# Patient Record
Sex: FEMALE | Race: WHITE | Employment: PART TIME | URBAN - METROPOLITAN AREA
[De-identification: names, ages, dates, MRNs, and addresses within clinical notes are randomized per-mention and may not be internally consistent; named-entity substitution may affect disease eponyms.]

---

## 2023-07-11 LAB
ANION GAP IN SER/PLAS: 11 MMOL/L (ref 10–20)
BASOPHILS (10*3/UL) IN BLOOD BY AUTOMATED COUNT: 0.04 X10E9/L (ref 0–0.1)
BASOPHILS/100 LEUKOCYTES IN BLOOD BY AUTOMATED COUNT: 0.6 % (ref 0–2)
CALCIUM (MG/DL) IN SER/PLAS: 9.3 MG/DL (ref 8.6–10.3)
CANCER AG 27-29 (U/ML) IN SER/PLAS: 43.8 U/ML (ref 0–38.6)
CARBON DIOXIDE, TOTAL (MMOL/L) IN SER/PLAS: 29 MMOL/L (ref 21–32)
CARCINOEMBRYONIC AG (NG/ML) IN SER/PLAS: 1.1 UG/L
CHLORIDE (MMOL/L) IN SER/PLAS: 102 MMOL/L (ref 98–107)
CREATININE (MG/DL) IN SER/PLAS: 0.83 MG/DL (ref 0.5–1.05)
EOSINOPHILS (10*3/UL) IN BLOOD BY AUTOMATED COUNT: 0.14 X10E9/L (ref 0–0.7)
EOSINOPHILS/100 LEUKOCYTES IN BLOOD BY AUTOMATED COUNT: 2.1 % (ref 0–6)
ERYTHROCYTE DISTRIBUTION WIDTH (RATIO) BY AUTOMATED COUNT: 12 % (ref 11.5–14.5)
ERYTHROCYTE MEAN CORPUSCULAR HEMOGLOBIN CONCENTRATION (G/DL) BY AUTOMATED: 32.6 G/DL (ref 32–36)
ERYTHROCYTE MEAN CORPUSCULAR VOLUME (FL) BY AUTOMATED COUNT: 87 FL (ref 80–100)
ERYTHROCYTES (10*6/UL) IN BLOOD BY AUTOMATED COUNT: 4.94 X10E12/L (ref 4–5.2)
GFR FEMALE: 76 ML/MIN/1.73M2
GLUCOSE (MG/DL) IN SER/PLAS: 89 MG/DL (ref 74–99)
HEMATOCRIT (%) IN BLOOD BY AUTOMATED COUNT: 43.2 % (ref 36–46)
HEMOGLOBIN (G/DL) IN BLOOD: 14.1 G/DL (ref 12–16)
IMMATURE GRANULOCYTES/100 LEUKOCYTES IN BLOOD BY AUTOMATED COUNT: 0.3 % (ref 0–0.9)
LEUKOCYTES (10*3/UL) IN BLOOD BY AUTOMATED COUNT: 6.8 X10E9/L (ref 4.4–11.3)
LYMPHOCYTES (10*3/UL) IN BLOOD BY AUTOMATED COUNT: 2.45 X10E9/L (ref 1.2–4.8)
LYMPHOCYTES/100 LEUKOCYTES IN BLOOD BY AUTOMATED COUNT: 36.2 % (ref 13–44)
MONOCYTES (10*3/UL) IN BLOOD BY AUTOMATED COUNT: 0.45 X10E9/L (ref 0.1–1)
MONOCYTES/100 LEUKOCYTES IN BLOOD BY AUTOMATED COUNT: 6.7 % (ref 2–10)
NEUTROPHILS (10*3/UL) IN BLOOD BY AUTOMATED COUNT: 3.66 X10E9/L (ref 1.2–7.7)
NEUTROPHILS/100 LEUKOCYTES IN BLOOD BY AUTOMATED COUNT: 54.1 % (ref 40–80)
PLATELETS (10*3/UL) IN BLOOD AUTOMATED COUNT: 288 X10E9/L (ref 150–450)
POTASSIUM (MMOL/L) IN SER/PLAS: 4.3 MMOL/L (ref 3.5–5.3)
SODIUM (MMOL/L) IN SER/PLAS: 138 MMOL/L (ref 136–145)
UREA NITROGEN (MG/DL) IN SER/PLAS: 19 MG/DL (ref 6–23)

## 2023-10-02 ENCOUNTER — PREP FOR PROCEDURE (OUTPATIENT)
Dept: OBSTETRICS AND GYNECOLOGY | Facility: HOSPITAL | Age: 68
End: 2023-10-02
Payer: MEDICARE

## 2023-10-02 DIAGNOSIS — N95.0 POSTMENOPAUSAL BLEEDING: Primary | ICD-10-CM

## 2023-10-03 PROBLEM — N95.0 POSTMENOPAUSAL BLEEDING: Status: ACTIVE | Noted: 2023-10-02

## 2023-10-03 ASSESSMENT — ENCOUNTER SYMPTOMS
OCCASIONAL FEELINGS OF UNSTEADINESS: 0
LOSS OF SENSATION IN FEET: 0

## 2023-10-04 ENCOUNTER — PREP FOR PROCEDURE (OUTPATIENT)
Dept: OBSTETRICS AND GYNECOLOGY | Facility: CLINIC | Age: 68
End: 2023-10-04
Payer: MEDICARE

## 2023-10-04 ENCOUNTER — LAB (OUTPATIENT)
Dept: LAB | Facility: LAB | Age: 68
End: 2023-10-04
Payer: MEDICARE

## 2023-10-04 ENCOUNTER — PRE-ADMISSION TESTING (OUTPATIENT)
Dept: PREADMISSION TESTING | Facility: HOSPITAL | Age: 68
End: 2023-10-04
Payer: MEDICARE

## 2023-10-04 VITALS
BODY MASS INDEX: 25.39 KG/M2 | HEIGHT: 63 IN | TEMPERATURE: 97.3 F | HEART RATE: 90 BPM | WEIGHT: 143.3 LBS | RESPIRATION RATE: 16 BRPM | OXYGEN SATURATION: 94 % | SYSTOLIC BLOOD PRESSURE: 127 MMHG | DIASTOLIC BLOOD PRESSURE: 63 MMHG

## 2023-10-04 DIAGNOSIS — Z01.818 PRE-OP EVALUATION: ICD-10-CM

## 2023-10-04 DIAGNOSIS — N95.0 POSTMENOPAUSAL BLEEDING: ICD-10-CM

## 2023-10-04 DIAGNOSIS — N95.0 POSTMENOPAUSAL BLEEDING: Primary | ICD-10-CM

## 2023-10-04 DIAGNOSIS — I10 HYPERTENSION, UNSPECIFIED TYPE: ICD-10-CM

## 2023-10-04 DIAGNOSIS — N95.0 PMB (POSTMENOPAUSAL BLEEDING): ICD-10-CM

## 2023-10-04 DIAGNOSIS — I10 HYPERTENSION, UNSPECIFIED TYPE: Primary | ICD-10-CM

## 2023-10-04 LAB
ANION GAP SERPL CALC-SCNC: 12 MMOL/L (ref 10–20)
BUN SERPL-MCNC: 24 MG/DL (ref 6–23)
CALCIUM SERPL-MCNC: 9.8 MG/DL (ref 8.6–10.3)
CHLORIDE SERPL-SCNC: 99 MMOL/L (ref 98–107)
CO2 SERPL-SCNC: 29 MMOL/L (ref 21–32)
CREAT SERPL-MCNC: 0.76 MG/DL (ref 0.5–1.05)
ERYTHROCYTE [DISTWIDTH] IN BLOOD BY AUTOMATED COUNT: 12 % (ref 11.5–14.5)
GFR SERPL CREATININE-BSD FRML MDRD: 85 ML/MIN/1.73M*2
GLUCOSE SERPL-MCNC: 69 MG/DL (ref 74–99)
HCT VFR BLD AUTO: 44.6 % (ref 36–46)
HGB BLD-MCNC: 14.8 G/DL (ref 12–16)
MCH RBC QN AUTO: 29.2 PG (ref 26–34)
MCHC RBC AUTO-ENTMCNC: 33.2 G/DL (ref 32–36)
MCV RBC AUTO: 88 FL (ref 80–100)
NRBC BLD-RTO: 0 /100 WBCS (ref 0–0)
PLATELET # BLD AUTO: 350 X10*3/UL (ref 150–450)
PMV BLD AUTO: 9 FL (ref 7.5–11.5)
POTASSIUM SERPL-SCNC: 4 MMOL/L (ref 3.5–5.3)
RBC # BLD AUTO: 5.07 X10*6/UL (ref 4–5.2)
SODIUM SERPL-SCNC: 136 MMOL/L (ref 136–145)
WBC # BLD AUTO: 8.1 X10*3/UL (ref 4.4–11.3)

## 2023-10-04 PROCEDURE — 36415 COLL VENOUS BLD VENIPUNCTURE: CPT

## 2023-10-04 PROCEDURE — 86900 BLOOD TYPING SEROLOGIC ABO: CPT

## 2023-10-04 PROCEDURE — 80048 BASIC METABOLIC PNL TOTAL CA: CPT

## 2023-10-04 PROCEDURE — 86850 RBC ANTIBODY SCREEN: CPT

## 2023-10-04 PROCEDURE — 86901 BLOOD TYPING SEROLOGIC RH(D): CPT

## 2023-10-04 PROCEDURE — 93005 ELECTROCARDIOGRAM TRACING: CPT

## 2023-10-04 PROCEDURE — 85027 COMPLETE CBC AUTOMATED: CPT

## 2023-10-04 RX ORDER — LISINOPRIL 20 MG/1
20 TABLET ORAL DAILY
COMMUNITY
Start: 2010-07-03 | End: 2023-12-06 | Stop reason: SDUPTHER

## 2023-10-04 RX ORDER — HYDROGEN PEROXIDE 3 %
20 SOLUTION, NON-ORAL MISCELLANEOUS DAILY
COMMUNITY

## 2023-10-04 RX ORDER — MULTIVITAMIN
1 TABLET ORAL DAILY
COMMUNITY

## 2023-10-04 ASSESSMENT — ENCOUNTER SYMPTOMS
GASTROINTESTINAL NEGATIVE: 1
ENDOCRINE NEGATIVE: 1
NEUROLOGICAL NEGATIVE: 1
MUSCULOSKELETAL NEGATIVE: 1
CARDIOVASCULAR NEGATIVE: 1
PSYCHIATRIC NEGATIVE: 1
RESPIRATORY NEGATIVE: 1
HEMATOLOGIC/LYMPHATIC NEGATIVE: 1
CONSTITUTIONAL NEGATIVE: 1
EYES NEGATIVE: 1

## 2023-10-04 ASSESSMENT — CHADS2 SCORE
AGE GREATER THAN OR EQUAL TO 75: NO
PRIOR STROKE OR TIA OR THROMBOEMBOLISM: NO
AGE GREATER THAN OR EQUAL TO 75: NO
DIABETES: NO
AGE GREATER THAN OR EQUAL TO 75: NO
CHF: NO
HYPERTENSION: YES

## 2023-10-04 ASSESSMENT — PAIN - FUNCTIONAL ASSESSMENT: PAIN_FUNCTIONAL_ASSESSMENT: 0-10

## 2023-10-04 ASSESSMENT — LIFESTYLE VARIABLES: SMOKING_STATUS: NONSMOKER

## 2023-10-04 ASSESSMENT — PAIN SCALES - GENERAL: PAINLEVEL_OUTOF10: 0 - NO PAIN

## 2023-10-04 ASSESSMENT — ACTIVITIES OF DAILY LIVING (ADL): ADL_SCORE: 0

## 2023-10-04 NOTE — PREPROCEDURE INSTRUCTIONS
Medication List            Accurate as of October 4, 2023  2:33 PM. Always use your most recent med list.                Daily Multi-Vitamin tablet  Generic drug: multivitamin  Medication Adjustments for Surgery: Stop 7 days before surgery     esomeprazole 20 mg DR capsule  Commonly known as: NexIUM  Medication Adjustments for Surgery: Take morning of surgery with sip of water, no other fluids     GEMFIBROZIL ORAL  Medication Adjustments for Surgery: Other (Comment)  Notes to patient: Take the day before surgery usual time if you take in am can take morning of surgery with sips of water     lisinopril 20 mg tablet  Medication Adjustments for Surgery: Continue until night before surgery                PRE-OPERATIVE INSTRUCTIONS    You will receive notification one business day prior to your surgery to confirm your arrival time and additional information. It is important that you answer your phone and/or check your messages during this time.    Please enter the building through the Outpatient entrance. Take the elevator off the lobby to the 2nd floor and check in at the Outpatient Surgery desk    INSTRUCTIONS:  Talk to your surgeon for instructions if you should stop your aspirin, blood thinner, or diabetes medicines.  DO NOT take any multivitamins or over the counter supplements for 7-10 days before surgery.  If not being admitted, you must have an adult immediately available to drive you home after surgery. We also highly recommend you have someone stay with you for the entire day and night of your surgery.  For children having surgery, a parent or legal guardian must accompany t hem to the surgery center. If this is not possible, please call [phone] to make additional arrangements.  For adults who are unable to consent or make medical decisions for themselves, a legal guardian or Power of  must accompany them to the surgery center. If this is not possible, please call [phone] to make additional  arrangements.  Wear comfortable, loose fitting clothing.  All jewelry and piercings must be removed. If you are unable to remove an item or have a dermal piercing, please be sure to tell the nurse when you arrive for surgery.  Nail polish and make-up must be removed.  Avoid smoking or consuming alcohol for 24 hours before surgery.  To help prevent infection, please take a shower/bath and wash your hair the night before and/or morning of surgery.    Additional instructions about eating and drinking before surgery:  Do not eat any solid foods or drink anything but clear liquids within 6 hours of your arrival time for surgery. Milk, nutritional drinks/supplements, and infant formula are considered solid foods.  You may drink clear liquids up to 2 hours before your arrival time for surgery, unless directed otherwise by your surgeon. Clear liquids include water, non-carbonated sports drinks (Gatorade), black tea or coffee (no creamers) and breast milk.    If you received a blue folder, please review additional information provided inside the folder regarding additional preparation.     If you have any questions or concerns, please call Pre-Admission Testing at [phone].

## 2023-10-04 NOTE — CPM/PAT H&P
CPM/PAT Evaluation       Name: Mica Vanessa (Mica Vanessa)  /Age: 1955/68 y.o.     In-Person       Chief Complaint: postmenopausal bleedng    Jennifer is a very pleasant 69 yo with PMHx of breast cancer, HTN, HLD, and postmenopausal bleeding.  Pt states she had an episode of bleeding last .  At that time she underwent pelvic ultrasound and uterine biopsy.  The last few months pt has had a few more episodes of bleeding.  She denies recent fever or chills..     Past Medical History:   Diagnosis Date    Abnormal ECG     hx of    Breast cancer (CMS/HCC)     Essential (primary) hypertension 2022    Benign essential hypertension    Hyperlipidemia     Other seasonal allergic rhinitis     Seasonal allergies    Personal history of malignant neoplasm of breast     History of malignant neoplasm of breast    Personal history of other diseases of the circulatory system     History of hypertension    Personal history of other diseases of the digestive system     History of gastrointestinal disorder       Past Surgical History:   Procedure Laterality Date    OTHER SURGICAL HISTORY  2022     section    OTHER SURGICAL HISTORY  2022    Tonsillectomy    OTHER SURGICAL HISTORY  2022    Lumpectomy       Patient  has no history on file for sexual activity.    No family history on file.    No Known Allergies    Prior to Admission medications    Medication Sig Start Date End Date Taking? Authorizing Provider   lisinopril 20 mg tablet Take 1 tablet (20 mg) by mouth once daily. 7/3/10  Yes Historical Provider, MD   esomeprazole (NexIUM) 20 mg DR capsule Take 1 capsule (20 mg) by mouth once daily.    Historical Provider, MD   GEMFIBROZIL ORAL Take 500 mg by mouth early in the morning..    Historical Provider, MD   multivitamin (Daily Multi-Vitamin) tablet Take 1 tablet by mouth once daily.    Historical Provider, MD        Review of Systems   Constitutional: Negative.    HENT: Negative.     Eyes:  Negative.    Respiratory: Negative.     Cardiovascular: Negative.    Gastrointestinal: Negative.    Endocrine: Negative.    Genitourinary:         Postmenopausal bleeding   Musculoskeletal: Negative.    Skin: Negative.    Neurological: Negative.    Hematological: Negative.    Psychiatric/Behavioral: Negative.           Physical Exam  Vitals reviewed.   Constitutional:       Appearance: Normal appearance.   HENT:      Head: Normocephalic and atraumatic.      Nose: Nose normal.   Eyes:      Pupils: Pupils are equal, round, and reactive to light.   Cardiovascular:      Rate and Rhythm: Normal rate and regular rhythm.   Pulmonary:      Effort: Pulmonary effort is normal.      Breath sounds: Normal breath sounds.   Abdominal:      Palpations: Abdomen is soft.   Musculoskeletal:         General: Normal range of motion.      Cervical back: Normal range of motion.   Skin:     General: Skin is warm and dry.   Neurological:      General: No focal deficit present.      Mental Status: She is alert and oriented to person, place, and time.   Psychiatric:         Mood and Affect: Mood normal.         Behavior: Behavior normal.          PAT AIRWAY:   normal        Visit Vitals  /63   Pulse 90   Temp 36.3 °C (97.3 °F) (Tympanic)   Resp 16   ECG left axis NSR rate of 81  No previous complications with anesthesia   Latest Reference Range & Units 07/11/23 11:35   GLUCOSE 74 - 99 mg/dL 89   SODIUM 136 - 145 mmol/L 138   POTASSIUM 3.5 - 5.3 mmol/L 4.3   CHLORIDE 98 - 107 mmol/L 102   Bicarbonate 21 - 32 mmol/L 29   Anion Gap 10 - 20 mmol/L 11   Blood Urea Nitrogen 6 - 23 mg/dL 19   Creatinine 0.50 - 1.05 mg/dL 0.83   Calcium 8.6 - 10.3 mg/dL 9.3      Latest Reference Range & Units 07/11/23 11:35   WBC 4.4 - 11.3 x10E9/L 6.8   RBC 4.00 - 5.20 x10E12/L 4.94   HEMOGLOBIN 12.0 - 16.0 g/dL 14.1   HEMATOCRIT 36.0 - 46.0 % 43.2   MCV 80 - 100 fL 87   MCHC 32.0 - 36.0 g/dL 32.6   RED CELL DISTRIBUTION WIDTH 11.5 - 14.5 % 12.0   Platelets  150 - 450 x10E9/L 288   Neutrophils % 40.0 - 80.0 % 54.1   Immature Granulocytes %, Automated 0.0 - 0.9 % 0.3   Lymphocytes % 13.0 - 44.0 % 36.2   Monocytes % 2.0 - 10.0 % 6.7   Eosinophils % 0.0 - 6.0 % 2.1   Basophils % 0.0 - 2.0 % 0.6   Neutrophils Absolute 1.20 - 7.70 x10E9/L 3.66   Lymphocytes Absolute 1.20 - 4.80 x10E9/L 2.45   Monocytes Absolute 0.10 - 1.00 x10E9/L 0.45   Eosinophils Absolute 0.00 - 0.70 x10E9/L 0.14   Basophils Absolute 0.00 - 0.10 x10E9/L 0.04       DASI Risk Score    No data to display       Caprini DVT Assessment      Flowsheet Row Most Recent Value   DVT Score 3   History Prior major surgery   Age 60-75 years          Modified Frailty Index      Flowsheet Row Most Recent Value   Modified Frailty Index Calculator .0909          CHADS2 Stroke Risk  Current as of 4 minutes ago        N/A 3 - 100%: High Risk   2 - 3%: Medium Risk   0 - 2%: Low Risk     Last Change: N/A          This score determines the patient's risk of having a stroke if the patient has atrial fibrillation.        This score is not applicable to this patient. Components are not calculated.          Revised Cardiac Risk Index      Flowsheet Row Most Recent Value   Revised Cardiac Risk Calculator 0          Apfel Simplified Score      Flowsheet Row Most Recent Value   Apfel Simplified Score Calculator 4          Risk Analysis Index Results This Encounter         10/4/2023  1411             MARQUEZ Cancer History: Patient does not indicate history of cancer          Stop Bang Score      Flowsheet Row Most Recent Value   Is your neck circumference greater than 17 inches (Male) or 16 inches (Female)? 0            Assessment and Plan:     Plan for OR on 10/6   CBC T & S  BMP

## 2023-10-05 LAB
ABO GROUP (TYPE) IN BLOOD: NORMAL
ANTIBODY SCREEN: NORMAL
RH FACTOR (ANTIGEN D): NORMAL

## 2023-10-06 ENCOUNTER — ANESTHESIA EVENT (OUTPATIENT)
Dept: OPERATING ROOM | Facility: HOSPITAL | Age: 68
End: 2023-10-06
Payer: MEDICARE

## 2023-10-06 ENCOUNTER — HOSPITAL ENCOUNTER (OUTPATIENT)
Facility: HOSPITAL | Age: 68
Setting detail: OUTPATIENT SURGERY
Discharge: HOME | End: 2023-10-06
Attending: OBSTETRICS & GYNECOLOGY | Admitting: OBSTETRICS & GYNECOLOGY
Payer: MEDICARE

## 2023-10-06 ENCOUNTER — ANESTHESIA (OUTPATIENT)
Dept: OPERATING ROOM | Facility: HOSPITAL | Age: 68
End: 2023-10-06
Payer: MEDICARE

## 2023-10-06 VITALS
DIASTOLIC BLOOD PRESSURE: 53 MMHG | TEMPERATURE: 96.8 F | HEART RATE: 68 BPM | SYSTOLIC BLOOD PRESSURE: 106 MMHG | WEIGHT: 140 LBS | OXYGEN SATURATION: 94 % | RESPIRATION RATE: 16 BRPM | HEIGHT: 62 IN | BODY MASS INDEX: 25.76 KG/M2

## 2023-10-06 DIAGNOSIS — Z01.818 ENCOUNTER FOR PREADMISSION TESTING: Primary | ICD-10-CM

## 2023-10-06 DIAGNOSIS — N95.0 POSTMENOPAUSAL BLEEDING: ICD-10-CM

## 2023-10-06 PROBLEM — I10 HTN (HYPERTENSION): Status: ACTIVE | Noted: 2023-10-06

## 2023-10-06 PROBLEM — K21.9 GASTROESOPHAGEAL REFLUX DISEASE: Status: ACTIVE | Noted: 2023-10-06

## 2023-10-06 LAB
ATRIAL RATE: 81 BPM
P AXIS: 67 DEGREES
P OFFSET: 168 MS
P ONSET: 124 MS
PR INTERVAL: 180 MS
Q ONSET: 214 MS
QRS COUNT: 13 BEATS
QRS DURATION: 82 MS
QT INTERVAL: 376 MS
QTC CALCULATION(BAZETT): 436 MS
QTC FREDERICIA: 415 MS
R AXIS: -44 DEGREES
T AXIS: 37 DEGREES
T OFFSET: 402 MS
VENTRICULAR RATE: 81 BPM

## 2023-10-06 PROCEDURE — 58558 HYSTEROSCOPY BIOPSY: CPT | Performed by: OBSTETRICS & GYNECOLOGY

## 2023-10-06 PROCEDURE — 2500000004 HC RX 250 GENERAL PHARMACY W/ HCPCS (ALT 636 FOR OP/ED): Performed by: STUDENT IN AN ORGANIZED HEALTH CARE EDUCATION/TRAINING PROGRAM

## 2023-10-06 PROCEDURE — 88341 IMHCHEM/IMCYTCHM EA ADD ANTB: CPT | Performed by: PATHOLOGY

## 2023-10-06 PROCEDURE — 2500000005 HC RX 250 GENERAL PHARMACY W/O HCPCS: Performed by: ANESTHESIOLOGIST ASSISTANT

## 2023-10-06 PROCEDURE — 88342 IMHCHEM/IMCYTCHM 1ST ANTB: CPT | Mod: TC,SUR,STJLAB | Performed by: OBSTETRICS & GYNECOLOGY

## 2023-10-06 PROCEDURE — 3700000001 HC GENERAL ANESTHESIA TIME - INITIAL BASE CHARGE: Performed by: OBSTETRICS & GYNECOLOGY

## 2023-10-06 PROCEDURE — 2720000007 HC OR 272 NO HCPCS: Performed by: OBSTETRICS & GYNECOLOGY

## 2023-10-06 PROCEDURE — 88342 IMHCHEM/IMCYTCHM 1ST ANTB: CPT | Performed by: PATHOLOGY

## 2023-10-06 PROCEDURE — 7100000001 HC RECOVERY ROOM TIME - INITIAL BASE CHARGE: Performed by: OBSTETRICS & GYNECOLOGY

## 2023-10-06 PROCEDURE — 88341 IMHCHEM/IMCYTCHM EA ADD ANTB: CPT | Mod: TC,STJLAB | Performed by: OBSTETRICS & GYNECOLOGY

## 2023-10-06 PROCEDURE — A58558 PR HYSTEROSCOPY,W/ENDO BX: Performed by: ANESTHESIOLOGIST ASSISTANT

## 2023-10-06 PROCEDURE — 7100000009 HC PHASE TWO TIME - INITIAL BASE CHARGE: Performed by: OBSTETRICS & GYNECOLOGY

## 2023-10-06 PROCEDURE — 2580000001 HC RX 258 IV SOLUTIONS: Performed by: ANESTHESIOLOGIST ASSISTANT

## 2023-10-06 PROCEDURE — 3700000002 HC GENERAL ANESTHESIA TIME - EACH INCREMENTAL 1 MINUTE: Performed by: OBSTETRICS & GYNECOLOGY

## 2023-10-06 PROCEDURE — 93010 ELECTROCARDIOGRAM REPORT: CPT | Performed by: INTERNAL MEDICINE

## 2023-10-06 PROCEDURE — 7100000002 HC RECOVERY ROOM TIME - EACH INCREMENTAL 1 MINUTE: Performed by: OBSTETRICS & GYNECOLOGY

## 2023-10-06 PROCEDURE — 7100000010 HC PHASE TWO TIME - EACH INCREMENTAL 1 MINUTE: Performed by: OBSTETRICS & GYNECOLOGY

## 2023-10-06 PROCEDURE — A58558 PR HYSTEROSCOPY,W/ENDO BX: Performed by: STUDENT IN AN ORGANIZED HEALTH CARE EDUCATION/TRAINING PROGRAM

## 2023-10-06 PROCEDURE — 3600000008 HC OR TIME - EACH INCREMENTAL 1 MINUTE - PROCEDURE LEVEL THREE: Performed by: OBSTETRICS & GYNECOLOGY

## 2023-10-06 PROCEDURE — 3600000003 HC OR TIME - INITIAL BASE CHARGE - PROCEDURE LEVEL THREE: Performed by: OBSTETRICS & GYNECOLOGY

## 2023-10-06 PROCEDURE — 2500000004 HC RX 250 GENERAL PHARMACY W/ HCPCS (ALT 636 FOR OP/ED): Performed by: ANESTHESIOLOGIST ASSISTANT

## 2023-10-06 PROCEDURE — 88305 TISSUE EXAM BY PATHOLOGIST: CPT | Performed by: PATHOLOGY

## 2023-10-06 RX ORDER — ONDANSETRON HYDROCHLORIDE 2 MG/ML
INJECTION, SOLUTION INTRAVENOUS AS NEEDED
Status: DISCONTINUED | OUTPATIENT
Start: 2023-10-06 | End: 2023-10-06

## 2023-10-06 RX ORDER — OXYCODONE HYDROCHLORIDE 5 MG/1
5 TABLET ORAL EVERY 4 HOURS PRN
Status: DISCONTINUED | OUTPATIENT
Start: 2023-10-06 | End: 2023-10-06 | Stop reason: HOSPADM

## 2023-10-06 RX ORDER — MIDAZOLAM HYDROCHLORIDE 1 MG/ML
INJECTION, SOLUTION INTRAMUSCULAR; INTRAVENOUS AS NEEDED
Status: DISCONTINUED | OUTPATIENT
Start: 2023-10-06 | End: 2023-10-06

## 2023-10-06 RX ORDER — METOCLOPRAMIDE HYDROCHLORIDE 5 MG/ML
10 INJECTION INTRAMUSCULAR; INTRAVENOUS ONCE AS NEEDED
Status: DISCONTINUED | OUTPATIENT
Start: 2023-10-06 | End: 2023-10-06 | Stop reason: HOSPADM

## 2023-10-06 RX ORDER — SODIUM CHLORIDE, SODIUM LACTATE, POTASSIUM CHLORIDE, CALCIUM CHLORIDE 600; 310; 30; 20 MG/100ML; MG/100ML; MG/100ML; MG/100ML
100 INJECTION, SOLUTION INTRAVENOUS CONTINUOUS
Status: DISCONTINUED | OUTPATIENT
Start: 2023-10-06 | End: 2023-10-06 | Stop reason: HOSPADM

## 2023-10-06 RX ORDER — LABETALOL HYDROCHLORIDE 5 MG/ML
5 INJECTION, SOLUTION INTRAVENOUS ONCE AS NEEDED
Status: DISCONTINUED | OUTPATIENT
Start: 2023-10-06 | End: 2023-10-06 | Stop reason: HOSPADM

## 2023-10-06 RX ORDER — LIDOCAINE HYDROCHLORIDE 10 MG/ML
0.1 INJECTION, SOLUTION EPIDURAL; INFILTRATION; INTRACAUDAL; PERINEURAL ONCE
Status: DISCONTINUED | OUTPATIENT
Start: 2023-10-06 | End: 2023-10-06 | Stop reason: HOSPADM

## 2023-10-06 RX ORDER — FENTANYL CITRATE 50 UG/ML
INJECTION, SOLUTION INTRAMUSCULAR; INTRAVENOUS AS NEEDED
Status: DISCONTINUED | OUTPATIENT
Start: 2023-10-06 | End: 2023-10-06

## 2023-10-06 RX ORDER — PROPOFOL 10 MG/ML
INJECTION, EMULSION INTRAVENOUS AS NEEDED
Status: DISCONTINUED | OUTPATIENT
Start: 2023-10-06 | End: 2023-10-06

## 2023-10-06 RX ORDER — HYDROMORPHONE HYDROCHLORIDE 1 MG/ML
0.5 INJECTION, SOLUTION INTRAMUSCULAR; INTRAVENOUS; SUBCUTANEOUS EVERY 5 MIN PRN
Status: DISCONTINUED | OUTPATIENT
Start: 2023-10-06 | End: 2023-10-06 | Stop reason: HOSPADM

## 2023-10-06 RX ORDER — MIDAZOLAM HYDROCHLORIDE 1 MG/ML
1 INJECTION, SOLUTION INTRAMUSCULAR; INTRAVENOUS ONCE AS NEEDED
Status: DISCONTINUED | OUTPATIENT
Start: 2023-10-06 | End: 2023-10-06 | Stop reason: HOSPADM

## 2023-10-06 RX ORDER — LIDOCAINE HYDROCHLORIDE 20 MG/ML
INJECTION, SOLUTION EPIDURAL; INFILTRATION; INTRACAUDAL; PERINEURAL AS NEEDED
Status: DISCONTINUED | OUTPATIENT
Start: 2023-10-06 | End: 2023-10-06

## 2023-10-06 RX ORDER — ALBUTEROL SULFATE 0.83 MG/ML
2.5 SOLUTION RESPIRATORY (INHALATION) ONCE AS NEEDED
Status: DISCONTINUED | OUTPATIENT
Start: 2023-10-06 | End: 2023-10-06 | Stop reason: HOSPADM

## 2023-10-06 RX ORDER — MEPERIDINE HYDROCHLORIDE 50 MG/ML
12.5 INJECTION INTRAMUSCULAR; INTRAVENOUS; SUBCUTANEOUS EVERY 10 MIN PRN
Status: DISCONTINUED | OUTPATIENT
Start: 2023-10-06 | End: 2023-10-06 | Stop reason: HOSPADM

## 2023-10-06 RX ORDER — METOCLOPRAMIDE HYDROCHLORIDE 5 MG/ML
10 INJECTION INTRAMUSCULAR; INTRAVENOUS ONCE AS NEEDED
Status: COMPLETED | OUTPATIENT
Start: 2023-10-06 | End: 2023-10-06

## 2023-10-06 RX ORDER — MIDAZOLAM HYDROCHLORIDE 1 MG/ML
1 INJECTION INTRAMUSCULAR; INTRAVENOUS ONCE AS NEEDED
Status: DISCONTINUED | OUTPATIENT
Start: 2023-10-06 | End: 2023-10-06 | Stop reason: HOSPADM

## 2023-10-06 RX ORDER — DEXAMETHASONE SODIUM PHOSPHATE 100 MG/10ML
INJECTION INTRAMUSCULAR; INTRAVENOUS AS NEEDED
Status: DISCONTINUED | OUTPATIENT
Start: 2023-10-06 | End: 2023-10-06

## 2023-10-06 RX ADMIN — LIDOCAINE HYDROCHLORIDE 100 MG: 20 INJECTION, SOLUTION EPIDURAL; INFILTRATION; INTRACAUDAL; PERINEURAL at 10:44

## 2023-10-06 RX ADMIN — DEXAMETHASONE SODIUM PHOSPHATE 10 MG: 10 INJECTION INTRAMUSCULAR; INTRAVENOUS at 10:51

## 2023-10-06 RX ADMIN — METOCLOPRAMIDE 10 MG: 5 INJECTION, SOLUTION INTRAMUSCULAR; INTRAVENOUS at 13:58

## 2023-10-06 RX ADMIN — PROPOFOL 200 MG: 10 INJECTION, EMULSION INTRAVENOUS at 10:44

## 2023-10-06 RX ADMIN — FENTANYL CITRATE 25 MCG: 50 INJECTION, SOLUTION INTRAMUSCULAR; INTRAVENOUS at 10:53

## 2023-10-06 RX ADMIN — SODIUM CHLORIDE, SODIUM LACTATE, POTASSIUM CHLORIDE, AND CALCIUM CHLORIDE: 600; 310; 30; 20 INJECTION, SOLUTION INTRAVENOUS at 10:22

## 2023-10-06 RX ADMIN — HYDROMORPHONE HYDROCHLORIDE 0.5 MG: 1 INJECTION, SOLUTION INTRAMUSCULAR; INTRAVENOUS; SUBCUTANEOUS at 11:28

## 2023-10-06 RX ADMIN — ONDANSETRON 4 MG: 2 INJECTION INTRAMUSCULAR; INTRAVENOUS at 10:52

## 2023-10-06 RX ADMIN — MIDAZOLAM 2 MG: 1 INJECTION INTRAMUSCULAR; INTRAVENOUS at 10:39

## 2023-10-06 RX ADMIN — HYDROMORPHONE HYDROCHLORIDE 0.5 MG: 1 INJECTION, SOLUTION INTRAMUSCULAR; INTRAVENOUS; SUBCUTANEOUS at 11:53

## 2023-10-06 RX ADMIN — FENTANYL CITRATE 25 MCG: 50 INJECTION, SOLUTION INTRAMUSCULAR; INTRAVENOUS at 10:58

## 2023-10-06 SDOH — HEALTH STABILITY: MENTAL HEALTH: CURRENT SMOKER: 0

## 2023-10-06 ASSESSMENT — PAIN SCALES - GENERAL
PAINLEVEL_OUTOF10: 1
PAINLEVEL_OUTOF10: 2
PAINLEVEL_OUTOF10: 0 - NO PAIN
PAINLEVEL_OUTOF10: 7
PAINLEVEL_OUTOF10: 1
PAINLEVEL_OUTOF10: 1
PAINLEVEL_OUTOF10: 6
PAINLEVEL_OUTOF10: 7
PAINLEVEL_OUTOF10: 0 - NO PAIN
PAINLEVEL_OUTOF10: 1
PAIN_LEVEL: 0
PAINLEVEL_OUTOF10: 2

## 2023-10-06 ASSESSMENT — PAIN - FUNCTIONAL ASSESSMENT
PAIN_FUNCTIONAL_ASSESSMENT: 0-10

## 2023-10-06 ASSESSMENT — PAIN DESCRIPTION - DESCRIPTORS
DESCRIPTORS: ACHING
DESCRIPTORS: DULL
DESCRIPTORS: DULL
DESCRIPTORS: SHARP

## 2023-10-06 ASSESSMENT — COLUMBIA-SUICIDE SEVERITY RATING SCALE - C-SSRS
1. IN THE PAST MONTH, HAVE YOU WISHED YOU WERE DEAD OR WISHED YOU COULD GO TO SLEEP AND NOT WAKE UP?: NO
6. HAVE YOU EVER DONE ANYTHING, STARTED TO DO ANYTHING, OR PREPARED TO DO ANYTHING TO END YOUR LIFE?: NO
2. HAVE YOU ACTUALLY HAD ANY THOUGHTS OF KILLING YOURSELF?: NO

## 2023-10-06 NOTE — OP NOTE
Date: 10/6/2023  OR Location: [facility] OR    Name: Mica Vanessa, : 1955, Age: 68 y.o., MRN: [id number], Sex: female    Diagnosis  Pre-op Diagnosis     * Postmenopausal bleeding [N95.0] Post-op Diagnosis     * Postmenopausal bleeding [N95.0]     Procedures    * HYSTEROSCOPY DILATION & CURETTAGE, POSS POLYPECTOMY W/AVETA   *DR STEVENSON TO ASSIST*    Surgeons      * Rosalee Parkinson - Primary    Resident/Fellow/Other Assistant:  Latoya Greene MD    Procedure Summary  Anesthesia: General  ASA: II  Anesthesia Staff: Anesthesiologist: Froylan Fontenot DO  C-AA: KEVIN Graff  Estimated Blood Loss: 5mL  Intra-op Medications:   Medication Name Total Dose   HYDROmorphone (Dilaudid) injection 0.5 mg 0.5 mg              Anesthesia Record               Intraprocedure I/O Totals          Intake    .00 mL    Total Intake 300 mL          Specimen:   ID Type Source Tests Collected by Time   1 : Endometrial Polyp and Endometrial Curettings Tissue ENDOMETRIUM POLYPECTOMY - HYSTEROSCOPIC SURGICAL PATHOLOGY EXAM Rosalee Parkinson MD 10/6/2023 1106        Staff:   Circulator: Janie Briscoe RN  Relief Scrub: Sarika Weeks RN  Scrub Person: Christine Girma       Procedure Details:  Indications for procedure:   Pt is a 68 y.o. yo female who presented to the office with recurrent postmenopausal bleeding.  She was counseled for a hysteroscopy, dilation and curettage, possible polypectomy.  Risks including bleeding, infection, uterine perforation discussed and consent signed.    Description of procedure:  The patient was taken to the operating room and placed in supine position on the operating room table.  A stop-check was performed confirming the patient and the procedure to be completed.  General anesthesia was administered. She was then placed in the dorsolithotomy position with her legs in Jamari stirrups.  SCDs were placed and turned on.  The abdomen, perineum, and vagina were prepped sterilely.  The patient was then  draped in standard sterile fashion.  A bivalve speculum was placed in the patient's vagina and the anterior lip of the cervix was grasped with a single tooth tenaculum.  The speculum was removed and lopez retractor was placed posteriorly.      The cervix was carefully and progressively dilated.  The hysteroscope was then inserted with visualization of the uterine body, fundus and tubal ostia.  A small polyp noted at right cornua which was resected using the Aveta. The hysteroscope was then removed.  A sharp curettage was performed.  Minimal bleeding was noted from the uterus.  The tenaculum was removed from the cervix.  The tenaculum sites were noted to be hemostatic.  All instruments were then removed from the vagina.      The patient was cleaned and dried and reversed from anesthesia.  She was returned to supine position.  The patient was taken to the PACU in stable and satisfactory condition.  All sponge, needle, and instrument counts were correct at the end of the procedure.    Rosalee Parkinson MD

## 2023-10-06 NOTE — ANESTHESIA PROCEDURE NOTES
Airway  Date/Time: 10/6/2023 10:46 AM  Urgency: elective    Airway not difficult    Staffing  Performed: KEVIN   Authorized by: Froylan Fontenot DO    Performed by: KEVIN Graff  Patient location during procedure: OR    Indications and Patient Condition  Indications for airway management: anesthesia  Spontaneous Ventilation: absent  Sedation level: deep  Preoxygenated: yes  Patient position: sniffing  Mask difficulty assessment: 1 - vent by mask    Final Airway Details  Final airway type: supraglottic airway      Successful airway: classic  Size 3     Number of attempts at approach: 1

## 2023-10-06 NOTE — ANESTHESIA PREPROCEDURE EVALUATION
Patient: Mica Vanessa    Procedure Information       Date/Time: 10/06/23 1050    Procedure: HYSTEROSCOPY DILATION & CURETTAGE, POSS POLYPECTOMY W/AVETA   *DR STEVENSON TO ASSIST*    Location: [facility] OR  / [facility] OR    Surgeons: Rosalee Parkinson MD            Relevant Problems   Cardiovascular   (+) HTN (hypertension)      GI   (+) Gastroesophageal reflux disease       Clinical information reviewed:    Allergies  Meds               NPO Detail:  No data recorded     Physical Exam    Airway  Mallampati: II  TM distance: >3 FB  Neck ROM: full     Cardiovascular   Rhythm: regular  Rate: normal     Dental - normal exam     Pulmonary - normal exam  Breath sounds clear to auscultation     Abdominal - normal exam  Abdomen: soft             Anesthesia Plan    ASA 2     general     The patient is not a current smoker.  Patient was not previously instructed to abstain from smoking on day of procedure.  Patient did not smoke on day of procedure.    intravenous induction   Postoperative administration of opioids is intended.  Anesthetic plan and risks discussed with patient.  Use of blood products discussed with who consented to blood products.    Plan discussed with CAA.

## 2023-10-06 NOTE — H&P
History Of Present Illness  Mica Vanessa is a 68 y.o. female presenting with postmenopausal bleeding.  Had a a TVUS which showed a 4.2 mm ES but since she had to episodes a office EMB was performed which showed scant degenerating glandular cells.  She then had an additional episode of bleeding and so plan for hysteroscopy, D&C in OR made for adequate sampling of uterus.       Past Medical History  Past Medical History:   Diagnosis Date    Abnormal ECG     hx of    Breast cancer (CMS/HCC)     Essential (primary) hypertension 2022    Benign essential hypertension    Hyperlipidemia     Other seasonal allergic rhinitis     Seasonal allergies    Personal history of malignant neoplasm of breast     History of malignant neoplasm of breast    Personal history of other diseases of the circulatory system     History of hypertension    Personal history of other diseases of the digestive system     History of gastrointestinal disorder       Surgical History  Past Surgical History:   Procedure Laterality Date    LUMBAR PERITONEAL SHUNT      OTHER SURGICAL HISTORY  2022     section    OTHER SURGICAL HISTORY  2022    Tonsillectomy    OTHER SURGICAL HISTORY  2022    Lumpectomy        Social History  She has no history on file for tobacco use, alcohol use, and drug use.    Family History  No family history on file.     Allergies  Patient has no known allergies.    Review of Systems   All other systems reviewed and are negative.       Physical Exam  Constitutional:       Appearance: Normal appearance.   Abdominal:      General: Abdomen is flat.      Palpations: Abdomen is soft.   Neurological:      Mental Status: She is alert.          Last Recorded Vitals  There were no vitals taken for this visit.    Relevant Results    Results for orders placed or performed in visit on 10/04/23 (from the past 96 hour(s))   Type And Screen   Result Value Ref Range    ABO TYPE A     Rh TYPE POS     ANTIBODY SCREEN  NEG    CBC   Result Value Ref Range    WBC 8.1 4.4 - 11.3 x10*3/uL    nRBC 0.0 0.0 - 0.0 /100 WBCs    RBC 5.07 4.00 - 5.20 x10*6/uL    Hemoglobin 14.8 12.0 - 16.0 g/dL    Hematocrit 44.6 36.0 - 46.0 %    MCV 88 80 - 100 fL    MCH 29.2 26.0 - 34.0 pg    MCHC 33.2 32.0 - 36.0 g/dL    RDW 12.0 11.5 - 14.5 %    Platelets 350 150 - 450 x10*3/uL    MPV 9.0 7.5 - 11.5 fL   Basic metabolic panel   Result Value Ref Range    Glucose 69 (L) 74 - 99 mg/dL    Sodium 136 136 - 145 mmol/L    Potassium 4.0 3.5 - 5.3 mmol/L    Chloride 99 98 - 107 mmol/L    Bicarbonate 29 21 - 32 mmol/L    Anion Gap 12 10 - 20 mmol/L    Urea Nitrogen 24 (H) 6 - 23 mg/dL    Creatinine 0.76 0.50 - 1.05 mg/dL    eGFR 85 >60 mL/min/1.73m*2    Calcium 9.8 8.6 - 10.3 mg/dL          Assessment/Plan   Principal Problem:    Postmenopausal bleeding    69 y/o  with recurrent PMB.  Plan hysteroscopy, D&C, possible polypectomy (if present).  Risks including bleeding, infection, uterine perforation discussed and consent signed.  No periop antibiotics indicated.     Rosalee Parkinson MD

## 2023-10-06 NOTE — ANESTHESIA POSTPROCEDURE EVALUATION
Patient: Mica Vanessa    Procedure Summary       Date: 10/06/23 Room / Location: [facility] OR 03 / Virtual [facility] OR    Anesthesia Start: 1039 Anesthesia Stop: 1124    Procedure: HYSTEROSCOPY DILATION & CURETTAGE, POSS POLYPECTOMY W/AVETA   *DR STEVENSON TO ASSIST* Diagnosis:       Postmenopausal bleeding      (POSTMENOPAUSAL BLEEDING)    Surgeons: Rosalee Parkinson MD Responsible Provider: Froylan Fontenot DO    Anesthesia Type: general ASA Status: 2            Anesthesia Type: general    Vitals Value Taken Time   /63 10/06/23 1124   Temp 36.3 10/06/23 1124   Pulse 56 10/06/23 1124   Resp 13 10/06/23 1124   SpO2 100 10/06/23 1124       Anesthesia Post Evaluation    Patient location during evaluation: PACU  Patient participation: complete - patient participated  Level of consciousness: awake  Pain score: 0  Pain management: adequate  Airway patency: patent  Cardiovascular status: acceptable  Respiratory status: acceptable  Hydration status: acceptable        There were no known notable events for this encounter.

## 2023-10-18 DIAGNOSIS — C54.1 ENDOMETRIAL CANCER (MULTI): Primary | ICD-10-CM

## 2023-10-18 LAB
LAB AP ASR DISCLAIMER: NORMAL
LABORATORY COMMENT REPORT: NORMAL
PATH REPORT.ADDENDUM SPEC: NORMAL
PATH REPORT.ADDENDUM SPEC: NORMAL
PATH REPORT.COMMENTS IMP SPEC: NORMAL
PATH REPORT.FINAL DX SPEC: NORMAL
PATH REPORT.GROSS SPEC: NORMAL
PATH REPORT.RELEVANT HX SPEC: NORMAL
PATH REPORT.TOTAL CANCER: NORMAL

## 2023-10-19 PROBLEM — R94.31 ABNORMAL EKG: Status: ACTIVE | Noted: 2023-10-19

## 2023-10-19 PROBLEM — K22.70 BARRETT'S ESOPHAGUS: Status: ACTIVE | Noted: 2019-01-03

## 2023-10-19 PROBLEM — D31.40 BENIGN NEOPLASM OF CILIARY BODY: Status: ACTIVE | Noted: 2019-10-29

## 2023-10-19 PROBLEM — H25.12 NUCLEAR SENILE CATARACT OF LEFT EYE: Status: ACTIVE | Noted: 2019-10-29

## 2023-10-19 PROBLEM — D31.42: Status: ACTIVE | Noted: 2019-10-29

## 2023-10-19 PROBLEM — I20.9 ANGINA PECTORIS (CMS-HCC): Status: ACTIVE | Noted: 2023-10-19

## 2023-10-19 RX ORDER — GEMFIBROZIL 600 MG/1
1 TABLET, FILM COATED ORAL DAILY
COMMUNITY

## 2023-10-19 RX ORDER — LISINOPRIL 20 MG/1
20 TABLET ORAL DAILY
COMMUNITY

## 2023-10-19 RX ORDER — ANASTROZOLE 1 MG/1
1 TABLET ORAL DAILY
COMMUNITY
End: 2023-12-06 | Stop reason: ALTCHOICE

## 2023-10-19 RX ORDER — FLUTICASONE PROPIONATE 50 MCG
SPRAY, SUSPENSION (ML) NASAL
COMMUNITY

## 2023-10-20 ENCOUNTER — APPOINTMENT (OUTPATIENT)
Dept: OBSTETRICS AND GYNECOLOGY | Facility: CLINIC | Age: 68
End: 2023-10-20
Payer: MEDICARE

## 2023-10-23 ENCOUNTER — APPOINTMENT (OUTPATIENT)
Dept: OBSTETRICS AND GYNECOLOGY | Facility: CLINIC | Age: 68
End: 2023-10-23
Payer: MEDICARE

## 2023-11-06 ASSESSMENT — PATIENT HEALTH QUESTIONNAIRE - PHQ9
3. TROUBLE FALLING OR STAYING ASLEEP OR SLEEPING TOO MUCH: 1
7. TROUBLE CONCENTRATING ON THINGS, SUCH AS READING THE NEWSPAPER OR WATCHING TELEVISION: NOT AT ALL
10. IF YOU CHECKED OFF ANY PROBLEMS, HOW DIFFICULT HAVE THESE PROBLEMS MADE IT FOR YOU TO DO YOUR WORK, TAKE CARE OF THINGS AT HOME, OR GET ALONG WITH OTHER PEOPLE: SOMEWHAT DIFFICULT
6. FEELING BAD ABOUT YOURSELF - OR THAT YOU ARE A FAILURE OR HAVE LET YOURSELF OR YOUR FAMILY DOWN: NOT AT ALL
SUM OF ALL RESPONSES TO PHQ QUESTIONS 1-9: 1
8. MOVING OR SPEAKING SO SLOWLY THAT OTHER PEOPLE COULD HAVE NOTICED. OR THE OPPOSITE, BEING SO FIGETY OR RESTLESS THAT YOU HAVE BEEN MOVING AROUND A LOT MORE THAN USUAL: 0
8. MOVING OR SPEAKING SO SLOWLY THAT OTHER PEOPLE COULD HAVE NOTICED. OR THE OPPOSITE, BEING SO FIGETY OR RESTLESS THAT YOU HAVE BEEN MOVING AROUND A LOT MORE THAN USUAL: NOT AT ALL
3. TROUBLE FALLING OR STAYING ASLEEP OR SLEEPING TOO MUCH: SEVERAL DAYS
2. FEELING DOWN, DEPRESSED OR HOPELESS: NOT AT ALL
9. THOUGHTS THAT YOU WOULD BE BETTER OFF DEAD, OR OF HURTING YOURSELF: NOT AT ALL
1. LITTLE INTEREST OR PLEASURE IN DOING THINGS: NOT AT ALL
7. TROUBLE CONCENTRATING ON THINGS, SUCH AS READING THE NEWSPAPER OR WATCHING TELEVISION: NOT AT ALL
4. FEELING TIRED OR HAVING LITTLE ENERGY: 0
2. FEELING DOWN, DEPRESSED, IRRITABLE, OR HOPELESS: 0
8. MOVING OR SPEAKING SO SLOWLY THAT OTHER PEOPLE COULD HAVE NOTICED. OR THE OPPOSITE, BEING SO FIGETY OR RESTLESS THAT YOU HAVE BEEN MOVING AROUND A LOT MORE THAN USUAL: NOT AT ALL
9. THOUGHTS THAT YOU WOULD BE BETTER OFF DEAD, OR OF HURTING YOURSELF: NOT AT ALL
5. POOR APPETITE OR OVEREATING: 0
5. POOR APPETITE OR OVEREATING: NOT AT ALL
6. FEELING BAD ABOUT YOURSELF - OR THAT YOU ARE A FAILURE OR HAVE LET YOURSELF OR YOUR FAMILY DOWN: 0
3. TROUBLE FALLING OR STAYING ASLEEP OR SLEEPING TOO MUCH: SEVERAL DAYS
6. FEELING BAD ABOUT YOURSELF - OR THAT YOU ARE A FAILURE OR HAVE LET YOURSELF OR YOUR FAMILY DOWN: NOT AT ALL
10. IF YOU CHECKED OFF ANY PROBLEMS, HOW DIFFICULT HAVE THESE PROBLEMS MADE IT FOR YOU TO DO YOUR WORK, TAKE CARE OF THINGS AT HOME, OR GET ALONG WITH OTHER PEOPLE: SOMEWHAT DIFFICULT
SUM OF ALL RESPONSES TO PHQ QUESTIONS 1-9: 1
2. FEELING DOWN, DEPRESSED, IRRITABLE, OR HOPELESS: NOT AT ALL
1. LITTLE INTEREST OR PLEASURE IN DOING THINGS: 0
1. LITTLE INTEREST OR PLEASURE IN DOING THINGS: NOT AT ALL
7. TROUBLE CONCENTRATING ON THINGS, SUCH AS READING THE NEWSPAPER OR WATCHING TELEVISION: 0
4. FEELING TIRED OR HAVING LITTLE ENERGY: NOT AT ALL
5. POOR APPETITE OR OVEREATING: NOT AT ALL
9. THOUGHTS THAT YOU WOULD BE BETTER OFF DEAD, OR OF HURTING YOURSELF: 0
4. FEELING TIRED OR HAVING LITTLE ENERGY: NOT AT ALL

## 2023-11-10 ENCOUNTER — PREP FOR PROCEDURE (OUTPATIENT)
Dept: OPERATING ROOM | Facility: HOSPITAL | Age: 68
End: 2023-11-10

## 2023-11-10 ENCOUNTER — OFFICE VISIT (OUTPATIENT)
Dept: GYNECOLOGIC ONCOLOGY | Facility: CLINIC | Age: 68
End: 2023-11-10
Payer: MEDICARE

## 2023-11-10 DIAGNOSIS — C54.1 MALIGNANT NEOPLASM OF ENDOMETRIUM (MULTI): Primary | ICD-10-CM

## 2023-11-10 DIAGNOSIS — C54.1 ENDOMETRIAL CANCER (MULTI): ICD-10-CM

## 2023-11-10 PROCEDURE — 3077F SYST BP >= 140 MM HG: CPT | Performed by: OBSTETRICS & GYNECOLOGY

## 2023-11-10 PROCEDURE — 3079F DIAST BP 80-89 MM HG: CPT | Performed by: OBSTETRICS & GYNECOLOGY

## 2023-11-10 PROCEDURE — 99214 OFFICE O/P EST MOD 30 MIN: CPT | Performed by: OBSTETRICS & GYNECOLOGY

## 2023-11-10 PROCEDURE — 1036F TOBACCO NON-USER: CPT | Performed by: OBSTETRICS & GYNECOLOGY

## 2023-11-10 PROCEDURE — 1159F MED LIST DOCD IN RCRD: CPT | Performed by: OBSTETRICS & GYNECOLOGY

## 2023-11-10 PROCEDURE — 1126F AMNT PAIN NOTED NONE PRSNT: CPT | Performed by: OBSTETRICS & GYNECOLOGY

## 2023-11-10 RX ORDER — CALCIUM CARBONATE 500(1250)
1 TABLET ORAL DAILY
COMMUNITY

## 2023-11-10 ASSESSMENT — ENCOUNTER SYMPTOMS
ENDOCRINE NEGATIVE: 1
EYES NEGATIVE: 1
CONSTITUTIONAL NEGATIVE: 1

## 2023-11-10 ASSESSMENT — COLUMBIA-SUICIDE SEVERITY RATING SCALE - C-SSRS
6. HAVE YOU EVER DONE ANYTHING, STARTED TO DO ANYTHING, OR PREPARED TO DO ANYTHING TO END YOUR LIFE?: NO
1. IN THE PAST MONTH, HAVE YOU WISHED YOU WERE DEAD OR WISHED YOU COULD GO TO SLEEP AND NOT WAKE UP?: NO
2. HAVE YOU ACTUALLY HAD ANY THOUGHTS OF KILLING YOURSELF?: NO

## 2023-11-10 ASSESSMENT — PATIENT HEALTH QUESTIONNAIRE - PHQ9
SUM OF ALL RESPONSES TO PHQ9 QUESTIONS 1 AND 2: 0
2. FEELING DOWN, DEPRESSED OR HOPELESS: NOT AT ALL
1. LITTLE INTEREST OR PLEASURE IN DOING THINGS: NOT AT ALL

## 2023-11-10 ASSESSMENT — PAIN SCALES - GENERAL: PAINLEVEL: 0-NO PAIN

## 2023-11-10 NOTE — PROGRESS NOTES
"Patient ID: Mica Vanessa is a 68 y.o. female.  Referring Physician: No referring provider defined for this encounter.  Primary Care Provider: Nurys Hector MD      Subjective    Referred by Dr. 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  No prior history of abnormal Pap.  Menopausal since her 50s    FH  Sister and daughter both had breast cancer.  They both had genetic testing which was negative.  The patient also had breast cancer but has not had genetic testing.  Feels cancer could be related to exposure to chemicals in her youth.    SH  Former smoker denies alcohol or illicit drug use.  Lives alone.  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    HENT:  Negative.     Eyes: Negative.    Endocrine: Negative.    Genitourinary:          Developed postmenopausal bleeding about 1 year ago EMB did not clearly identify cancer.  Ultrasound and hysteroscopy D&C ultimately performed for persistent bleeding   All other systems reviewed and are negative.       Objective   BSA: 1.67 meters squared  /80   Pulse 84   Temp 36.5 °C (97.7 °F)   Resp 14   Ht (S) 1.541 m (5' 0.67\")   Wt 64.8 kg (142 lb 13.7 oz)   SpO2 95%   BMI 27.29 kg/m²      Family History   Problem Relation Name Age of Onset    Osteoporosis Mother      Thyroid disease Mother      Hypertension Father      Stroke Other Grandparent     Hypertension Other Grandparent     Breast cancer Child      Breast cancer Sibling         Mica Vanessa  reports that she quit smoking about 16 years ago. Her smoking use included cigarettes. She has never used smokeless tobacco.  She  reports that she does not currently use alcohol.  She  reports no history of drug " use.    Physical Exam  Constitutional:       Appearance: Normal appearance.   HENT:      Head: Normocephalic and atraumatic.      Nose: Nose normal.      Mouth/Throat:      Mouth: Mucous membranes are moist.   Eyes:      Conjunctiva/sclera: Conjunctivae normal.      Pupils: Pupils are equal, round, and reactive to light.   Cardiovascular:      Rate and Rhythm: Normal rate and regular rhythm.      Pulses: Normal pulses.   Pulmonary:      Effort: Pulmonary effort is normal.      Breath sounds: Normal breath sounds.   Abdominal:      General: There is no distension.      Palpations: Abdomen is soft. There is no mass.      Tenderness: There is no abdominal tenderness.      Hernia: No hernia is present.   Genitourinary:     General: Normal vulva.      Exam position: Lithotomy position.      Vagina: Normal.      Cervix: Normal.      Uterus: Normal.       Adnexa: Right adnexa normal and left adnexa normal.        Right: No mass.          Left: No mass.        Comments: No parametrial extension on rectovaginal exam  Musculoskeletal:         General: No swelling.      Right lower leg: No edema.      Left lower leg: No edema.   Skin:     General: Skin is warm and dry.   Neurological:      General: No focal deficit present.      Mental Status: She is alert.   Psychiatric:         Mood and Affect: Mood normal.         Thought Content: Thought content normal.       Performance Status:  Asymptomatic    Assessment/Plan      Oncology History    No history exists.          Problem List Items Addressed This Visit    None  Visit Diagnoses         Codes    Malignant neoplasm of endometrium (CMS/HCC)    -  Primary C54.1    Relevant Orders    Case Request Operating Room: Hysterectomy Laparoscopy with Salpingo-Oophorectomy, Lymphadenectomy Laparoscopy (Completed)            Treatment Plans       No treatment plans exist              Reviewed natural history and prognosis of uterine cancer.  Discussed surgical staging.  Schedule TLH/BSO,  sentinel LND.  She will need PAT.  Risks of surgery reviewed including bleeding, infection, damage to surrounding structures.

## 2023-11-13 VITALS
SYSTOLIC BLOOD PRESSURE: 145 MMHG | WEIGHT: 142.86 LBS | HEART RATE: 84 BPM | BODY MASS INDEX: 26.97 KG/M2 | RESPIRATION RATE: 14 BRPM | TEMPERATURE: 97.7 F | OXYGEN SATURATION: 95 % | DIASTOLIC BLOOD PRESSURE: 80 MMHG | HEIGHT: 61 IN

## 2023-11-13 PROBLEM — C54.1 MALIGNANT NEOPLASM OF ENDOMETRIUM (MULTI): Status: ACTIVE | Noted: 2023-11-10

## 2023-11-14 ENCOUNTER — TELEPHONE (OUTPATIENT)
Dept: GYNECOLOGIC ONCOLOGY | Facility: HOSPITAL | Age: 68
End: 2023-11-14
Payer: MEDICARE

## 2023-11-14 NOTE — TELEPHONE ENCOUNTER
Patient called with questions regarding 12/21/23 scheduled TLH/BSO.    Reviewed with patient:   driving restrictions, length of surgery and recovery as well as need for someone to stay with her x 24 hours following surgery.        Patient also asked about assistance with transportation to hospital the date of surgery as she has limited family in the area to bring her.     Will message  to check on transportation resources.

## 2023-11-15 ENCOUNTER — TELEPHONE (OUTPATIENT)
Dept: HEMATOLOGY/ONCOLOGY | Facility: CLINIC | Age: 68
End: 2023-11-15
Payer: MEDICARE

## 2023-11-15 NOTE — TELEPHONE ENCOUNTER
Pt called-Wanted you to be aware that she has a new diagnosis of uterine cancer and is scheduled for a hysterectomy on 12/21/23. She is currently under the care of Dr. Nannette Palacio for this.

## 2023-11-22 ENCOUNTER — TELEPHONE (OUTPATIENT)
Dept: OBSTETRICS AND GYNECOLOGY | Facility: CLINIC | Age: 68
End: 2023-11-22
Payer: MEDICARE

## 2023-11-22 NOTE — TELEPHONE ENCOUNTER
Pt had hysteroscopy with Tesha on 10/6. She has hysterectomy scheduled for 12/21. She is still experiencing some bleeding and wanted to talk to someone to discuss if that is still normal and okay before having her surgery. Please advise if an appt is needed.

## 2023-12-06 ENCOUNTER — PRE-ADMISSION TESTING (OUTPATIENT)
Dept: PREADMISSION TESTING | Facility: HOSPITAL | Age: 68
End: 2023-12-06
Payer: MEDICARE

## 2023-12-06 VITALS
SYSTOLIC BLOOD PRESSURE: 140 MMHG | BODY MASS INDEX: 27.18 KG/M2 | DIASTOLIC BLOOD PRESSURE: 82 MMHG | WEIGHT: 143.96 LBS | HEIGHT: 61 IN | HEART RATE: 97 BPM | TEMPERATURE: 97.8 F | OXYGEN SATURATION: 97 %

## 2023-12-06 DIAGNOSIS — N95.0 POSTMENOPAUSAL BLEEDING: ICD-10-CM

## 2023-12-06 LAB
ABO GROUP (TYPE) IN BLOOD: NORMAL
ANION GAP SERPL CALC-SCNC: 14 MMOL/L (ref 10–20)
ANTIBODY SCREEN: NORMAL
BUN SERPL-MCNC: 21 MG/DL (ref 6–23)
CALCIUM SERPL-MCNC: 9.8 MG/DL (ref 8.6–10.6)
CHLORIDE SERPL-SCNC: 102 MMOL/L (ref 98–107)
CO2 SERPL-SCNC: 25 MMOL/L (ref 21–32)
CREAT SERPL-MCNC: 0.64 MG/DL (ref 0.5–1.05)
ERYTHROCYTE [DISTWIDTH] IN BLOOD BY AUTOMATED COUNT: 11.9 % (ref 11.5–14.5)
GFR SERPL CREATININE-BSD FRML MDRD: >90 ML/MIN/1.73M*2
GLUCOSE SERPL-MCNC: 126 MG/DL (ref 74–99)
HCT VFR BLD AUTO: 45.3 % (ref 36–46)
HGB BLD-MCNC: 14.5 G/DL (ref 12–16)
MCH RBC QN AUTO: 28.7 PG (ref 26–34)
MCHC RBC AUTO-ENTMCNC: 32 G/DL (ref 32–36)
MCV RBC AUTO: 90 FL (ref 80–100)
NRBC BLD-RTO: 0 /100 WBCS (ref 0–0)
PLATELET # BLD AUTO: 282 X10*3/UL (ref 150–450)
POTASSIUM SERPL-SCNC: 4.4 MMOL/L (ref 3.5–5.3)
RBC # BLD AUTO: 5.06 X10*6/UL (ref 4–5.2)
RH FACTOR (ANTIGEN D): NORMAL
SODIUM SERPL-SCNC: 137 MMOL/L (ref 136–145)
WBC # BLD AUTO: 7.4 X10*3/UL (ref 4.4–11.3)

## 2023-12-06 PROCEDURE — 99204 OFFICE O/P NEW MOD 45 MIN: CPT | Performed by: NURSE PRACTITIONER

## 2023-12-06 PROCEDURE — 36415 COLL VENOUS BLD VENIPUNCTURE: CPT

## 2023-12-06 PROCEDURE — 85027 COMPLETE CBC AUTOMATED: CPT

## 2023-12-06 PROCEDURE — 86850 RBC ANTIBODY SCREEN: CPT

## 2023-12-06 PROCEDURE — 80048 BASIC METABOLIC PNL TOTAL CA: CPT

## 2023-12-06 PROCEDURE — 86901 BLOOD TYPING SEROLOGIC RH(D): CPT

## 2023-12-06 ASSESSMENT — DUKE ACTIVITY SCORE INDEX (DASI)
TOTAL_SCORE: 58.2
CAN YOU TAKE CARE OF YOURSELF (EAT, DRESS, BATHE, OR USE TOILET): YES
CAN YOU WALK INDOORS, SUCH AS AROUND YOUR HOUSE: YES
CAN YOU HAVE SEXUAL RELATIONS: YES
CAN YOU DO HEAVY WORK AROUND THE HOUSE LIKE SCRUBBING FLOORS OR LIFTING AND MOVING HEAVY FURNITURE: YES
CAN YOU DO MODERATE WORK AROUND THE HOUSE LIKE VACUUMING, SWEEPING FLOORS OR CARRYING GROCERIES: YES
CAN YOU PARTICIPATE IN STRENOUS SPORTS LIKE SWIMMING, SINGLES TENNIS, FOOTBALL, BASKETBALL, OR SKIING: YES
DASI METS SCORE: 9.9
CAN YOU CLIMB A FLIGHT OF STAIRS OR WALK UP A HILL: YES
CAN YOU RUN A SHORT DISTANCE: YES
CAN YOU PARTICIPATE IN MODERATE RECREATIONAL ACTIVITIES LIKE GOLF, BOWLING, DANCING, DOUBLES TENNIS OR THROWING A BASEBALL OR FOOTBALL: YES
CAN YOU DO LIGHT WORK AROUND THE HOUSE LIKE DUSTING OR WASHING DISHES: YES
CAN YOU DO YARD WORK LIKE RAKING LEAVES, WEEDING OR PUSHING A MOWER: YES
CAN YOU WALK A BLOCK OR TWO ON LEVEL GROUND: YES

## 2023-12-06 ASSESSMENT — CHADS2 SCORE
PRIOR STROKE OR TIA OR THROMBOEMBOLISM: NO
AGE GREATER THAN OR EQUAL TO 75: NO
CHADS2 SCORE: 1
HYPERTENSION: YES
DIABETES: NO
CHF: NO

## 2023-12-06 ASSESSMENT — ENCOUNTER SYMPTOMS
EYES NEGATIVE: 1
RESPIRATORY NEGATIVE: 1
CARDIOVASCULAR NEGATIVE: 1
NEUROLOGICAL NEGATIVE: 1
ENDOCRINE NEGATIVE: 1
NECK NEGATIVE: 1
MUSCULOSKELETAL NEGATIVE: 1
GASTROINTESTINAL NEGATIVE: 1
CONSTITUTIONAL NEGATIVE: 1

## 2023-12-06 ASSESSMENT — LIFESTYLE VARIABLES: SMOKING_STATUS: NONSMOKER

## 2023-12-06 NOTE — CPM/PAT H&P
CPM/PAT Evaluation       Name: Mica Vanessa (Mica Vanessa)  /Age: 1955/68 y.o.     Visit Type:   In-Person       Chief Complaint: AUB    HPI    The patient is a 68 year old female with history of hypertension, hyperlipidemia, breast cancer post menopausal bleeding. She is s/p hysteroscopy D&C with pathology proven endometrial carcinoma. She currently denies fever, chills, n/v, abdominal pain, chest pain, sob or changes in bowel or bladder pattern. She presents today for perioperative evaluation in anticipation of hysterectomy laparoscopy with salping-oophorectomy, lymphadenectomy laparoscopy, bilateral on 23 with Dr. Palacio.    Past Medical History:   Diagnosis Date    Abnormal ECG     hx of    Arthritis     Khan esophagus     Breast cancer (CMS/HCC)     chemo/radiation right sided lumpectomy    Dysfunctional uterine bleeding     Essential (primary) hypertension 2022    Benign essential hypertension    GERD (gastroesophageal reflux disease)     Hyperlipidemia     Osteopenia     Other seasonal allergic rhinitis     Seasonal allergies    Personal history of malignant neoplasm of breast     History of malignant neoplasm of breast    Personal history of other diseases of the circulatory system     History of hypertension    Personal history of other diseases of the digestive system     History of gastrointestinal disorder       Past Surgical History:   Procedure Laterality Date    LUMBAR PERITONEAL SHUNT      OTHER SURGICAL HISTORY  2022     section    OTHER SURGICAL HISTORY  2022    Tonsillectomy    OTHER SURGICAL HISTORY  2022    Lumpectomy    SKIN CANCER EXCISION      vulva    TUBAL LIGATION         Patient Sexual activity questions deferred to the physician.    Family History   Problem Relation Name Age of Onset    Osteoporosis Mother      Thyroid disease Mother      Hypertension Father      Stroke Other Grandparent     Hypertension Other Grandparent      Breast cancer Child      Breast cancer Sibling         No Known Allergies    Prior to Admission medications    Medication Sig Start Date End Date Taking? Authorizing Provider   calcium carbonate (Oscal) 500 mg calcium (1,250 mg) tablet Take 1 tablet (1,250 mg) by mouth once daily.    Historical Provider, MD   esomeprazole (NexIUM) 20 mg DR capsule Take 1 capsule (20 mg) by mouth once daily.    Historical Provider, MD   fluticasone (Flonase) 50 mcg/actuation nasal spray Administer into affected nostril(s). Administer into affected nostril(s).    Historical Provider, MD   gemfibrozil (Lopid) 600 mg tablet Take 1 tablet (600 mg) by mouth once daily.    Historical Provider, MD   lisinopril 20 mg tablet Take 1 tablet (20 mg) by mouth once daily.    Historical Provider, MD   multivitamin (Daily Multi-Vitamin) tablet Take 1 tablet by mouth once daily.    Historical Provider, MD   pseudoephedrine HCl (SUDAFED 12 HOUR ORAL) Take by mouth.    Historical Provider, MD   anastrozole (Arimidex) 1 mg tablet Take 1 tablet (1 mg total) by mouth once daily.  12/6/23  Historical Provider, MD   coffee xt/phosphatidyl serine (NEURIVA ORIGINAL ORAL) Take by mouth once daily.  12/6/23  Historical Provider, MD   docosahexaenoic acid/epa (FISH OIL ORAL) Take by mouth.  12/6/23  Historical Provider, MD   GEMFIBROZIL ORAL Take 500 mg by mouth early in the morning..  12/6/23  Historical Provider, MD   lisinopril 20 mg tablet Take 1 tablet (20 mg) by mouth once daily. 7/3/10 12/6/23  Historical Provider, MD   TURMERIC ORAL Take by mouth.  12/6/23  Historical Provider, MD FOSTER ROS:   Constitutional:   neg    Neuro/Psych:   neg    Eyes:   neg     use of corrective lenses  Ears:   neg    Nose:   neg    Mouth:   neg    Throat:   neg    Neck:   neg    Cardio:   neg    Respiratory:   neg    Endocrine:   neg    GI:   neg    :   neg    Musculoskeletal:   neg    Hematologic:   neg    Skin:  neg        Physical Exam  Vitals reviewed.    Constitutional:       Appearance: Normal appearance.   HENT:      Head: Normocephalic and atraumatic.      Nose: Nose normal.      Mouth/Throat:      Mouth: Mucous membranes are moist.      Pharynx: Oropharynx is clear.   Eyes:      Extraocular Movements: Extraocular movements intact.      Conjunctiva/sclera: Conjunctivae normal.      Pupils: Pupils are equal, round, and reactive to light.   Cardiovascular:      Rate and Rhythm: Normal rate and regular rhythm.      Pulses: Normal pulses.      Heart sounds: Normal heart sounds.   Pulmonary:      Effort: Pulmonary effort is normal.      Breath sounds: Normal breath sounds.   Musculoskeletal:         General: Normal range of motion.      Cervical back: Normal range of motion.   Skin:     General: Skin is warm and dry.   Neurological:      General: No focal deficit present.      Mental Status: She is alert and oriented to person, place, and time.   Psychiatric:         Mood and Affect: Mood normal.         Behavior: Behavior normal.          PAT AIRWAY:   Airway:     Mallampati::  IV    TM distance::  >3 FB    Neck ROM::  Full  normal        Visit Vitals  /82   Pulse 97   Temp 36.6 °C (97.8 °F)       DASI Risk Score      Flowsheet Row Most Recent Value   DASI SCORE 58.2   METS Score (Will be calculated only when all the questions are answered) 9.9          Caprini DVT Assessment      Flowsheet Row Most Recent Value   DVT Score 13   Current Status Major surgery planned, lasting over 3 hours, Present cancer or chemotherapy   History Prior major surgery, Previous malignancy   Age 60-75 years   BMI 30 or less          Modified Frailty Index      Flowsheet Row Most Recent Value   Modified Frailty Index Calculator .0909          CHADS2 Stroke Risk  Current as of a minute ago        N/A 3 - 100%: High Risk   2 - 3%: Medium Risk   0 - 2%: Low Risk     Last Change: N/A          This score determines the patient's risk of having a stroke if the patient has atrial  fibrillation.        This score is not applicable to this patient. Components are not calculated.          Revised Cardiac Risk Index      Flowsheet Row Most Recent Value   Revised Cardiac Risk Calculator 1          Apfel Simplified Score      Flowsheet Row Most Recent Value   Apfel Simplified Score Calculator 3          Risk Analysis Index Results This Encounter    No data found in the last 1 encounters.       Stop Bang Score      Flowsheet Row Most Recent Value   Do you snore loudly? 0   Do you often feel tired or fatigued after your sleep? 0   Has anyone ever observed you stop breathing in your sleep? 0   Do you have or are you being treated for high blood pressure? 1   Recent BMI (Calculated) 27.3   Is BMI greater than 35 kg/m2? 0=No   Age older than 50 years old? 1=Yes   Is your neck circumference greater than 17 inches (Male) or 16 inches (Female)? 0   Gender - Male 0=No   STOP-BANG Total Score 2          Recent Results (from the past 336 hour(s))   Type And Screen    Collection Time: 12/06/23  4:12 PM   Result Value Ref Range    ABO TYPE A     Rh TYPE POS     ANTIBODY SCREEN NEG    CBC    Collection Time: 12/06/23  4:12 PM   Result Value Ref Range    WBC 7.4 4.4 - 11.3 x10*3/uL    nRBC 0.0 0.0 - 0.0 /100 WBCs    RBC 5.06 4.00 - 5.20 x10*6/uL    Hemoglobin 14.5 12.0 - 16.0 g/dL    Hematocrit 45.3 36.0 - 46.0 %    MCV 90 80 - 100 fL    MCH 28.7 26.0 - 34.0 pg    MCHC 32.0 32.0 - 36.0 g/dL    RDW 11.9 11.5 - 14.5 %    Platelets 282 150 - 450 x10*3/uL   Basic Metabolic Panel    Collection Time: 12/06/23  4:18 PM   Result Value Ref Range    Glucose 126 (H) 74 - 99 mg/dL    Sodium 137 136 - 145 mmol/L    Potassium 4.4 3.5 - 5.3 mmol/L    Chloride 102 98 - 107 mmol/L    Bicarbonate 25 21 - 32 mmol/L    Anion Gap 14 10 - 20 mmol/L    Urea Nitrogen 21 6 - 23 mg/dL    Creatinine 0.64 0.50 - 1.05 mg/dL    eGFR >90 >60 mL/min/1.73m*2    Calcium 9.8 8.6 - 10.6 mg/dL        Diagnostic Results      EKG 10/6/23  Normal sinus  rhythm  Possible Left atrial enlargement  Left anterior fascicular block with late transition  Abnormal ECG        Exercise Stress Echo  Study Date:       9/14/2022     CONCLUSION     Clinically, electrocardiographically, and echocardiographically normal stress echocardiogram, with no evidence of stress-induced ischemia at maximum workload. Patient demonstrated limited functional capacity      Assessment and Plan:     Neuro:   The patient has no neurological diagnoses or significant findings on chart review, clinical presentation, and evaluation.  No grossly apparent perioperative risk. The patient is at increased risk for perioperative stroke secondary to hypertension , female gender, general anesthesia, operative time >2.5 hours. Handouts for preoperative brain exercises given to patient.    HEENT/Airway  No diagnoses, significant findings on chart review, clinical presentation, or evaluation., No documented or reported history of airway difficulty.     Cardiovascular  The patient is scheduled for non-cardiac surgery associated with elevated risk.  The patient has no major cardiac contraindications to non- cardiac surgery.  RCRI  The patient meets 0-1 RCRI criteria and therefore has a less than 1% risk of major adverse cardiac complications.  METS  The patient's functional capacity capacity is greater than 4 METS.  EKG  The patient has history of abnormal EKG , LAFB 9/2022 followed by negative stress echo. See above.  Heart Failure  The patient has no known history of heart failure.  Additionally, the patient reports no symptoms of heart failure and demonstrates no signs of heart failure.  Hypertension Evaluation  The patient has a known history of hypertension that is controlled.  Patient's hypertension is most consistent with stage 2.  Heart Rhythm Evaluation  The patient has no history of arrhythmias.  Heart Valve Evaluation  The patient has no known history of valvular heart disease. The patient has no  symptoms or physical exam findings to suggest valvular heart disease.  CARDS EVAL  The patient is not followed by cardiology.  The patient has a 30-day risk for MACE of 1 predictor, 6.0% risk for cardiac death, nonfatal myocardial infarction, and nonfatal cardiac arrest.  MIGUEL score which indicates a 0.2% risk of intraoperative or 30-day postoperative.    Pulmonary   No significant findings on chart review or clinical presentation and evaluation. The patient is at increased risk of perioperative pulmonary complications secondary to advanced age greater than 60.  The patient has a stop bang score of 2 which places patient at low risk for having NAGA.  ARISCAT 26, Intermediate, 13.3% risk of in-hospital postoperative pulmonary complications  PRODIGY 11, intermediate of respiratory depression episode.    Hematology  History of breast cancer s/p right lumpectomy, chemo and radiation therapy in 2007. Skin cancer s/p excision. Vulvar lesion s/p excision. Now with endometrial carcinoma scheduled for lap hysterectomy on 12/21/23 with Dr. Palacio.  Antiplatelet management   The patient is not currently receiving antiplatelet therapy.  Anticoagulation management  The patient is not currently receiving anticoagulation therapy. Patient provided with DVT educational handout.    Caprini score 13, high risk of perioperative VTE  Transfusion Evaluation  A type and screen was obtained given the likelihood for perioperative transfusion of blood or blood products.    GI  The patient has diagnoses or significant findings on chart review or clinical presentation and evaluation significant for history of GERD, Khan's esophagus controlled on PPI.   Eat 10- 0,  self-perceived oropharyngeal dysphagia scale (0-40)     Genitourinary  No diagnoses or significant findings on chart review or clinical presentation and evaluation.    Renal  The patient has no known history of chronic kidney disease. No renal diagnoses or significant findings  on chart review or clinical presentation and evaluation. The patient has specific risk factors associated with increased risk of perioperative renal complications due to age greater than 55, hypertension. Preventative measures include preoperative hydration.    Musculoskeletal  No diagnoses or significant findings on chart review or clinical presentation and evaluation.    Endocrine  Diabetes Evaluation  The patient has no history of diabetes mellitus  Thyroid Disease Evaluation  The patient has no history of thyroid disease.    ID  No diagnoses or significant findings on chart review or clinical presentation and evaluation.    -Preoperative medication instructions were provided and reviewed with the patient.  Any additional testing or evaluation was explained to the patient.  NPO Instructions were discussed, and the patient's questions were answered prior to conclusion of this encounter

## 2023-12-14 NOTE — HOSPITAL COURSE
69 yo pt presenting for Laparoscopic hysterectomy, BSO and [place] lymphadenectomy for endometrial cancer.  Pt had postmenopausal bleeding 1 year ago. EMB didn't clearly identify cancer. Ultrasound and hysteroscopy D&C ultimately performed for persistent bleeding     Relevant Pathology: 10/6/2023  EMB: Endometrial adenocarcinoma, Mesonephric-like type     Relevant Imagin2023 CT chest/abdomen/pelvis  1. No evident recurrent neoplasm.  2. Bronchocentric nodular densities upper lobe bilateral most  suggestive of mucous plugging versus mild focal bronchiolitis or scar  although nonspecific are unchanged as far back as 2023  although increased compared with 2022 baseline.  Attention recommended on follow-up assessment.  3. Additional pre-existing presumed benign pulmonary nodules are  unchanged.  4. Additional similar pre-existing findings as reported.     PAT: abnormal EKG-->normal stress test, Caprini 13, cleared    PMH  Breast cancer -required surgery chemotherapy and radiation  Hypertension  Arthritis     PSH  C/s X 3  Removal of vulvar SCC  Lumpectomy        OB/Gyn hx  G3, P3  x3.  No prior history of abnormal Pap.  Menopausal since her 50s     FH  Sister and daughter both had breast cancer.  They both had genetic testing which was negative.  The patient also had breast cancer but has not had genetic testing.  Feels cancer could be related to exposure to chemicals in her youth.     SH  Former smoker denies alcohol or illicit drug use.  Lives alone.  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

## 2023-12-20 ENCOUNTER — ANESTHESIA EVENT (OUTPATIENT)
Dept: OPERATING ROOM | Facility: HOSPITAL | Age: 68
End: 2023-12-20
Payer: MEDICARE

## 2023-12-20 NOTE — H&P
History Of Present Illness  69 yo pt presenting for Laparoscopic hysterectomy, BSO and [place] lymphadenectomy for endometrial cancer.  Pt had postmenopausal bleeding 1 year ago. EMB didn't clearly identify cancer. Ultrasound and hysteroscopy D&C ultimately performed for persistent bleeding     Preop labs : Hgb 14.5, Plt 282, Cr 0.64    Relevant Pathology: 10/6/2023  EMB: Endometrial adenocarcinoma, Mesonephric-like type     Relevant Imagin2023 CT chest/abdomen/pelvis  1. No evident recurrent neoplasm.  2. Bronchocentric nodular densities upper lobe bilateral most  suggestive of mucous plugging versus mild focal bronchiolitis or scar  although nonspecific are unchanged as far back as 2023  although increased compared with 2022 baseline.  Attention recommended on follow-up assessment.  3. Additional pre-existing presumed benign pulmonary nodules are  unchanged.  4. Additional similar pre-existing findings as reported.     PAT: abnormal EKG-->normal stress test, Caprini 13, cleared    PMH  Breast cancer -required surgery chemotherapy and radiation  Hypertension  Arthritis     PSH  C/s X 3  Removal of vulvar SCC  Lumpectomy        OB/Gyn hx  G3, P3  x3.  No prior history of abnormal Pap.  Menopausal since her 50s     FH  Sister and daughter both had breast cancer.  They both had genetic testing which was negative.  The patient also had breast cancer but has not had genetic testing.  Feels cancer could be related to exposure to chemicals in her youth.     SH  Former smoker denies alcohol or illicit drug use.  Lives alone.  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       Physical Exam  Constitutional:       Appearance: Normal appearance.   HENT:      Head: Normocephalic and atraumatic.      Nose: Nose normal.      Mouth/Throat:      Mouth: Mucous membranes are moist.   Eyes:      Extraocular  Movements: Extraocular movements intact.   Pulmonary:      Effort: Pulmonary effort is normal.   Abdominal:      General: Abdomen is flat.   Musculoskeletal:         General: Normal range of motion.      Cervical back: Normal range of motion.   Skin:     General: Skin is warm and dry.   Neurological:      General: No focal deficit present.      Mental Status: She is alert and oriented to person, place, and time.   Psychiatric:         Mood and Affect: Mood normal.         Behavior: Behavior normal.          Last Recorded Vitals  Weight 64.9 kg (143 lb).    Assessment/Plan   Principal Problem:    Malignant neoplasm of endometrium (CMS/HCC)    67 yo pt presenting for Laparoscopic hysterectomy, BSO and [place] lymphadenectomy for endometrial cancer.    - Cleared by PAT  - Plan for same day discharge  - Consent signed    D/w Dr. Pia Steinberg MD  PGY-3, Obstetrics and Gynecology  Gyn Oncology Pager: [phone]

## 2023-12-21 ENCOUNTER — ANESTHESIA (OUTPATIENT)
Dept: OPERATING ROOM | Facility: HOSPITAL | Age: 68
End: 2023-12-21
Payer: MEDICARE

## 2023-12-21 ENCOUNTER — HOSPITAL ENCOUNTER (OUTPATIENT)
Facility: HOSPITAL | Age: 68
Setting detail: OUTPATIENT SURGERY
Discharge: HOME | End: 2023-12-21
Attending: OBSTETRICS & GYNECOLOGY | Admitting: OBSTETRICS & GYNECOLOGY
Payer: MEDICARE

## 2023-12-21 VITALS
BODY MASS INDEX: 27.18 KG/M2 | RESPIRATION RATE: 16 BRPM | DIASTOLIC BLOOD PRESSURE: 75 MMHG | OXYGEN SATURATION: 95 % | WEIGHT: 143.96 LBS | SYSTOLIC BLOOD PRESSURE: 128 MMHG | HEART RATE: 67 BPM | HEIGHT: 61 IN | TEMPERATURE: 97.9 F

## 2023-12-21 DIAGNOSIS — C54.1 MALIGNANT NEOPLASM OF ENDOMETRIUM (MULTI): ICD-10-CM

## 2023-12-21 DIAGNOSIS — Z90.710 STATUS POST HYSTERECTOMY: Primary | ICD-10-CM

## 2023-12-21 PROCEDURE — 2720000007 HC OR 272 NO HCPCS: Performed by: OBSTETRICS & GYNECOLOGY

## 2023-12-21 PROCEDURE — A58571 PR LAPAROSCOPY W TOT HYSTERECTUTERUS <=250 GRAM  W TUBE/OVARY: Performed by: ANESTHESIOLOGY

## 2023-12-21 PROCEDURE — 3600000004 HC OR TIME - INITIAL BASE CHARGE - PROCEDURE LEVEL FOUR: Performed by: OBSTETRICS & GYNECOLOGY

## 2023-12-21 PROCEDURE — 3600000009 HC OR TIME - EACH INCREMENTAL 1 MINUTE - PROCEDURE LEVEL FOUR: Performed by: OBSTETRICS & GYNECOLOGY

## 2023-12-21 PROCEDURE — 96372 THER/PROPH/DIAG INJ SC/IM: CPT

## 2023-12-21 PROCEDURE — 2500000004 HC RX 250 GENERAL PHARMACY W/ HCPCS (ALT 636 FOR OP/ED): Performed by: OBSTETRICS & GYNECOLOGY

## 2023-12-21 PROCEDURE — 7100000002 HC RECOVERY ROOM TIME - EACH INCREMENTAL 1 MINUTE: Performed by: OBSTETRICS & GYNECOLOGY

## 2023-12-21 PROCEDURE — 2500000004 HC RX 250 GENERAL PHARMACY W/ HCPCS (ALT 636 FOR OP/ED)

## 2023-12-21 PROCEDURE — A4217 STERILE WATER/SALINE, 500 ML: HCPCS | Performed by: OBSTETRICS & GYNECOLOGY

## 2023-12-21 PROCEDURE — 2780000003 HC OR 278 NO HCPCS: Performed by: OBSTETRICS & GYNECOLOGY

## 2023-12-21 PROCEDURE — 7100000009 HC PHASE TWO TIME - INITIAL BASE CHARGE: Performed by: OBSTETRICS & GYNECOLOGY

## 2023-12-21 PROCEDURE — 38900 IO MAP OF SENT LYMPH NODE: CPT | Performed by: OBSTETRICS & GYNECOLOGY

## 2023-12-21 PROCEDURE — 88309 TISSUE EXAM BY PATHOLOGIST: CPT | Performed by: STUDENT IN AN ORGANIZED HEALTH CARE EDUCATION/TRAINING PROGRAM

## 2023-12-21 PROCEDURE — 94760 N-INVAS EAR/PLS OXIMETRY 1: CPT

## 2023-12-21 PROCEDURE — 2500000005 HC RX 250 GENERAL PHARMACY W/O HCPCS: Performed by: OBSTETRICS & GYNECOLOGY

## 2023-12-21 PROCEDURE — 88307 TISSUE EXAM BY PATHOLOGIST: CPT | Performed by: STUDENT IN AN ORGANIZED HEALTH CARE EDUCATION/TRAINING PROGRAM

## 2023-12-21 PROCEDURE — 3700000001 HC GENERAL ANESTHESIA TIME - INITIAL BASE CHARGE: Performed by: OBSTETRICS & GYNECOLOGY

## 2023-12-21 PROCEDURE — 2500000005 HC RX 250 GENERAL PHARMACY W/O HCPCS

## 2023-12-21 PROCEDURE — 88307 TISSUE EXAM BY PATHOLOGIST: CPT | Mod: TC,SUR | Performed by: OBSTETRICS & GYNECOLOGY

## 2023-12-21 PROCEDURE — 2500000001 HC RX 250 WO HCPCS SELF ADMINISTERED DRUGS (ALT 637 FOR MEDICARE OP)

## 2023-12-21 PROCEDURE — 7100000010 HC PHASE TWO TIME - EACH INCREMENTAL 1 MINUTE: Performed by: OBSTETRICS & GYNECOLOGY

## 2023-12-21 PROCEDURE — 58571 TLH W/T/O 250 G OR LESS: CPT | Performed by: OBSTETRICS & GYNECOLOGY

## 2023-12-21 PROCEDURE — 38572 LAPAROSCOPY LYMPHADENECTOMY: CPT | Performed by: OBSTETRICS & GYNECOLOGY

## 2023-12-21 PROCEDURE — 99223 1ST HOSP IP/OBS HIGH 75: CPT | Performed by: STUDENT IN AN ORGANIZED HEALTH CARE EDUCATION/TRAINING PROGRAM

## 2023-12-21 PROCEDURE — 3700000002 HC GENERAL ANESTHESIA TIME - EACH INCREMENTAL 1 MINUTE: Performed by: OBSTETRICS & GYNECOLOGY

## 2023-12-21 PROCEDURE — 2500000001 HC RX 250 WO HCPCS SELF ADMINISTERED DRUGS (ALT 637 FOR MEDICARE OP): Performed by: OBSTETRICS & GYNECOLOGY

## 2023-12-21 PROCEDURE — 7100000001 HC RECOVERY ROOM TIME - INITIAL BASE CHARGE: Performed by: OBSTETRICS & GYNECOLOGY

## 2023-12-21 RX ORDER — PROPOFOL 10 MG/ML
INJECTION, EMULSION INTRAVENOUS AS NEEDED
Status: DISCONTINUED | OUTPATIENT
Start: 2023-12-21 | End: 2023-12-21

## 2023-12-21 RX ORDER — LABETALOL HYDROCHLORIDE 5 MG/ML
5 INJECTION, SOLUTION INTRAVENOUS ONCE AS NEEDED
Status: DISCONTINUED | OUTPATIENT
Start: 2023-12-21 | End: 2023-12-21 | Stop reason: HOSPADM

## 2023-12-21 RX ORDER — HYDROMORPHONE HYDROCHLORIDE 1 MG/ML
INJECTION, SOLUTION INTRAMUSCULAR; INTRAVENOUS; SUBCUTANEOUS AS NEEDED
Status: DISCONTINUED | OUTPATIENT
Start: 2023-12-21 | End: 2023-12-21

## 2023-12-21 RX ORDER — ROCURONIUM BROMIDE 10 MG/ML
INJECTION, SOLUTION INTRAVENOUS AS NEEDED
Status: DISCONTINUED | OUTPATIENT
Start: 2023-12-21 | End: 2023-12-21

## 2023-12-21 RX ORDER — HYDROMORPHONE HYDROCHLORIDE 1 MG/ML
0.5 INJECTION, SOLUTION INTRAMUSCULAR; INTRAVENOUS; SUBCUTANEOUS EVERY 5 MIN PRN
Status: DISCONTINUED | OUTPATIENT
Start: 2023-12-21 | End: 2023-12-21 | Stop reason: HOSPADM

## 2023-12-21 RX ORDER — ACETAMINOPHEN 325 MG/1
975 TABLET ORAL ONCE
Status: COMPLETED | OUTPATIENT
Start: 2023-12-21 | End: 2023-12-21

## 2023-12-21 RX ORDER — HYDROMORPHONE HYDROCHLORIDE 1 MG/ML
0.2 INJECTION, SOLUTION INTRAMUSCULAR; INTRAVENOUS; SUBCUTANEOUS EVERY 5 MIN PRN
Status: DISCONTINUED | OUTPATIENT
Start: 2023-12-21 | End: 2023-12-21 | Stop reason: HOSPADM

## 2023-12-21 RX ORDER — AMOXICILLIN 250 MG
2 CAPSULE ORAL 2 TIMES DAILY
Qty: 28 TABLET | Refills: 0 | Status: SHIPPED | OUTPATIENT
Start: 2023-12-21 | End: 2023-12-28

## 2023-12-21 RX ORDER — DROPERIDOL 2.5 MG/ML
0.62 INJECTION, SOLUTION INTRAMUSCULAR; INTRAVENOUS ONCE AS NEEDED
Status: DISCONTINUED | OUTPATIENT
Start: 2023-12-21 | End: 2023-12-21 | Stop reason: HOSPADM

## 2023-12-21 RX ORDER — PHENYLEPHRINE HCL IN 0.9% NACL 0.4MG/10ML
SYRINGE (ML) INTRAVENOUS AS NEEDED
Status: DISCONTINUED | OUTPATIENT
Start: 2023-12-21 | End: 2023-12-21

## 2023-12-21 RX ORDER — ACETAMINOPHEN 325 MG/1
650 TABLET ORAL EVERY 4 HOURS PRN
Status: DISCONTINUED | OUTPATIENT
Start: 2023-12-21 | End: 2023-12-21 | Stop reason: HOSPADM

## 2023-12-21 RX ORDER — ONDANSETRON HYDROCHLORIDE 2 MG/ML
INJECTION, SOLUTION INTRAVENOUS AS NEEDED
Status: DISCONTINUED | OUTPATIENT
Start: 2023-12-21 | End: 2023-12-21

## 2023-12-21 RX ORDER — DEXAMETHASONE SODIUM PHOSPHATE 4 MG/ML
INJECTION, SOLUTION INTRA-ARTICULAR; INTRALESIONAL; INTRAMUSCULAR; INTRAVENOUS; SOFT TISSUE AS NEEDED
Status: DISCONTINUED | OUTPATIENT
Start: 2023-12-21 | End: 2023-12-21

## 2023-12-21 RX ORDER — MIDAZOLAM HYDROCHLORIDE 1 MG/ML
INJECTION INTRAMUSCULAR; INTRAVENOUS AS NEEDED
Status: DISCONTINUED | OUTPATIENT
Start: 2023-12-21 | End: 2023-12-21

## 2023-12-21 RX ORDER — CEFAZOLIN 1 G/1
INJECTION, POWDER, FOR SOLUTION INTRAVENOUS AS NEEDED
Status: DISCONTINUED | OUTPATIENT
Start: 2023-12-21 | End: 2023-12-21

## 2023-12-21 RX ORDER — INDOCYANINE GREEN AND WATER 25 MG
KIT INJECTION AS NEEDED
Status: DISCONTINUED | OUTPATIENT
Start: 2023-12-21 | End: 2023-12-21 | Stop reason: HOSPADM

## 2023-12-21 RX ORDER — LIDOCAINE HCL/PF 100 MG/5ML
SYRINGE (ML) INTRAVENOUS AS NEEDED
Status: DISCONTINUED | OUTPATIENT
Start: 2023-12-21 | End: 2023-12-21

## 2023-12-21 RX ORDER — WATER 1 ML/ML
IRRIGANT IRRIGATION AS NEEDED
Status: DISCONTINUED | OUTPATIENT
Start: 2023-12-21 | End: 2023-12-21 | Stop reason: HOSPADM

## 2023-12-21 RX ORDER — LIDOCAINE IN NACL,ISO-OSMOT/PF 30 MG/3 ML
0.1 SYRINGE (ML) INJECTION ONCE
Status: DISCONTINUED | OUTPATIENT
Start: 2023-12-21 | End: 2023-12-21 | Stop reason: HOSPADM

## 2023-12-21 RX ORDER — TRAMADOL HYDROCHLORIDE 50 MG/1
50 TABLET ORAL EVERY 6 HOURS PRN
Qty: 10 TABLET | Refills: 0 | Status: SHIPPED | OUTPATIENT
Start: 2023-12-21 | End: 2023-12-24

## 2023-12-21 RX ORDER — FENTANYL CITRATE 50 UG/ML
INJECTION, SOLUTION INTRAMUSCULAR; INTRAVENOUS AS NEEDED
Status: DISCONTINUED | OUTPATIENT
Start: 2023-12-21 | End: 2023-12-21

## 2023-12-21 RX ORDER — HEPARIN SODIUM 5000 [USP'U]/ML
5000 INJECTION, SOLUTION INTRAVENOUS; SUBCUTANEOUS ONCE
Status: COMPLETED | OUTPATIENT
Start: 2023-12-21 | End: 2023-12-21

## 2023-12-21 RX ORDER — SODIUM CHLORIDE 0.9 G/100ML
IRRIGANT IRRIGATION AS NEEDED
Status: DISCONTINUED | OUTPATIENT
Start: 2023-12-21 | End: 2023-12-21 | Stop reason: HOSPADM

## 2023-12-21 RX ORDER — GABAPENTIN 600 MG/1
600 TABLET ORAL ONCE
Status: COMPLETED | OUTPATIENT
Start: 2023-12-21 | End: 2023-12-21

## 2023-12-21 RX ORDER — CELECOXIB 200 MG/1
400 CAPSULE ORAL ONCE
Status: COMPLETED | OUTPATIENT
Start: 2023-12-21 | End: 2023-12-21

## 2023-12-21 RX ORDER — IBUPROFEN 600 MG/1
600 TABLET ORAL EVERY 6 HOURS
Qty: 28 TABLET | Refills: 0 | Status: SHIPPED | OUTPATIENT
Start: 2023-12-21 | End: 2023-12-28

## 2023-12-21 RX ORDER — SODIUM CHLORIDE, SODIUM LACTATE, POTASSIUM CHLORIDE, CALCIUM CHLORIDE 600; 310; 30; 20 MG/100ML; MG/100ML; MG/100ML; MG/100ML
100 INJECTION, SOLUTION INTRAVENOUS CONTINUOUS
Status: DISCONTINUED | OUTPATIENT
Start: 2023-12-21 | End: 2023-12-21 | Stop reason: HOSPADM

## 2023-12-21 RX ORDER — ONDANSETRON HYDROCHLORIDE 2 MG/ML
4 INJECTION, SOLUTION INTRAVENOUS ONCE AS NEEDED
Status: COMPLETED | OUTPATIENT
Start: 2023-12-21 | End: 2023-12-21

## 2023-12-21 RX ORDER — ACETAMINOPHEN 500 MG
1000 TABLET ORAL EVERY 6 HOURS
Qty: 56 TABLET | Refills: 0 | Status: SHIPPED | OUTPATIENT
Start: 2023-12-21 | End: 2023-12-28

## 2023-12-21 RX ORDER — NORETHINDRONE AND ETHINYL ESTRADIOL 0.5-0.035
KIT ORAL AS NEEDED
Status: DISCONTINUED | OUTPATIENT
Start: 2023-12-21 | End: 2023-12-21

## 2023-12-21 RX ADMIN — ROCURONIUM BROMIDE 20 MG: 10 INJECTION INTRAVENOUS at 08:46

## 2023-12-21 RX ADMIN — Medication 80 MCG: at 08:50

## 2023-12-21 RX ADMIN — ACETAMINOPHEN 975 MG: 325 TABLET ORAL at 06:49

## 2023-12-21 RX ADMIN — HYDROMORPHONE HYDROCHLORIDE 0.5 MG: 1 INJECTION, SOLUTION INTRAMUSCULAR; INTRAVENOUS; SUBCUTANEOUS at 10:31

## 2023-12-21 RX ADMIN — ONDANSETRON 4 MG: 2 INJECTION INTRAMUSCULAR; INTRAVENOUS at 14:15

## 2023-12-21 RX ADMIN — CELECOXIB 400 MG: 200 CAPSULE ORAL at 06:49

## 2023-12-21 RX ADMIN — Medication 40 MCG: at 09:36

## 2023-12-21 RX ADMIN — ROCURONIUM BROMIDE 50 MG: 10 INJECTION INTRAVENOUS at 07:39

## 2023-12-21 RX ADMIN — EPHEDRINE SULFATE 5 MG: 50 INJECTION, SOLUTION INTRAVENOUS at 08:25

## 2023-12-21 RX ADMIN — CEFAZOLIN 2 G: 330 INJECTION, POWDER, FOR SOLUTION INTRAMUSCULAR; INTRAVENOUS at 08:00

## 2023-12-21 RX ADMIN — HYDROMORPHONE HYDROCHLORIDE 0.4 MG: 1 INJECTION, SOLUTION INTRAMUSCULAR; INTRAVENOUS; SUBCUTANEOUS at 09:54

## 2023-12-21 RX ADMIN — Medication 2 L/MIN: at 10:40

## 2023-12-21 RX ADMIN — HEPARIN SODIUM 5000 UNITS: 5000 INJECTION INTRAVENOUS; SUBCUTANEOUS at 07:28

## 2023-12-21 RX ADMIN — SUGAMMADEX 200 MG: 100 INJECTION, SOLUTION INTRAVENOUS at 09:57

## 2023-12-21 RX ADMIN — SODIUM CHLORIDE, POTASSIUM CHLORIDE, SODIUM LACTATE AND CALCIUM CHLORIDE 100 ML/HR: 600; 310; 30; 20 INJECTION, SOLUTION INTRAVENOUS at 11:29

## 2023-12-21 RX ADMIN — GABAPENTIN 600 MG: 600 TABLET, FILM COATED ORAL at 06:49

## 2023-12-21 RX ADMIN — Medication 120 MCG: at 07:56

## 2023-12-21 RX ADMIN — LIDOCAINE HYDROCHLORIDE 100 MG: 20 INJECTION INTRAVENOUS at 07:39

## 2023-12-21 RX ADMIN — SODIUM CHLORIDE, SODIUM LACTATE, POTASSIUM CHLORIDE, AND CALCIUM CHLORIDE: 600; 310; 30; 20 INJECTION, SOLUTION INTRAVENOUS at 07:20

## 2023-12-21 RX ADMIN — HYDROMORPHONE HYDROCHLORIDE 0.5 MG: 1 INJECTION, SOLUTION INTRAMUSCULAR; INTRAVENOUS; SUBCUTANEOUS at 10:37

## 2023-12-21 RX ADMIN — FENTANYL CITRATE 100 MCG: 50 INJECTION, SOLUTION INTRAMUSCULAR; INTRAVENOUS at 07:39

## 2023-12-21 RX ADMIN — PROPOFOL 150 MG: 10 INJECTION, EMULSION INTRAVENOUS at 07:39

## 2023-12-21 RX ADMIN — Medication 6 L/MIN: at 10:17

## 2023-12-21 RX ADMIN — DEXAMETHASONE SODIUM PHOSPHATE 10 MG: 4 INJECTION, SOLUTION INTRA-ARTICULAR; INTRALESIONAL; INTRAMUSCULAR; INTRAVENOUS; SOFT TISSUE at 08:09

## 2023-12-21 RX ADMIN — ROCURONIUM BROMIDE 10 MG: 10 INJECTION INTRAVENOUS at 09:33

## 2023-12-21 RX ADMIN — MIDAZOLAM HYDROCHLORIDE 2 MG: 1 INJECTION, SOLUTION INTRAMUSCULAR; INTRAVENOUS at 07:20

## 2023-12-21 RX ADMIN — ONDANSETRON 4 MG: 2 INJECTION INTRAMUSCULAR; INTRAVENOUS at 09:53

## 2023-12-21 RX ADMIN — PROPOFOL 20 MG: 10 INJECTION, EMULSION INTRAVENOUS at 09:57

## 2023-12-21 SDOH — HEALTH STABILITY: MENTAL HEALTH: CURRENT SMOKER: 0

## 2023-12-21 ASSESSMENT — COLUMBIA-SUICIDE SEVERITY RATING SCALE - C-SSRS
2. HAVE YOU ACTUALLY HAD ANY THOUGHTS OF KILLING YOURSELF?: NO
1. IN THE PAST MONTH, HAVE YOU WISHED YOU WERE DEAD OR WISHED YOU COULD GO TO SLEEP AND NOT WAKE UP?: NO
2. HAVE YOU ACTUALLY HAD ANY THOUGHTS OF KILLING YOURSELF?: NO
6. HAVE YOU EVER DONE ANYTHING, STARTED TO DO ANYTHING, OR PREPARED TO DO ANYTHING TO END YOUR LIFE?: NO
1. IN THE PAST MONTH, HAVE YOU WISHED YOU WERE DEAD OR WISHED YOU COULD GO TO SLEEP AND NOT WAKE UP?: NO
6. HAVE YOU EVER DONE ANYTHING, STARTED TO DO ANYTHING, OR PREPARED TO DO ANYTHING TO END YOUR LIFE?: NO

## 2023-12-21 ASSESSMENT — PAIN - FUNCTIONAL ASSESSMENT

## 2023-12-21 ASSESSMENT — PAIN SCALES - GENERAL
PAINLEVEL_OUTOF10: 4
PAINLEVEL_OUTOF10: 2
PAINLEVEL_OUTOF10: 4
PAINLEVEL_OUTOF10: 5 - MODERATE PAIN
PAINLEVEL_OUTOF10: 3
PAINLEVEL_OUTOF10: 2
PAINLEVEL_OUTOF10: 0 - NO PAIN
PAINLEVEL_OUTOF10: 0 - NO PAIN
PAINLEVEL_OUTOF10: 8
PAINLEVEL_OUTOF10: 2
PAINLEVEL_OUTOF10: 3
PAINLEVEL_OUTOF10: 8
PAINLEVEL_OUTOF10: 3
PAINLEVEL_OUTOF10: 2
PAINLEVEL_OUTOF10: 0 - NO PAIN
PAINLEVEL_OUTOF10: 3

## 2023-12-21 NOTE — ANESTHESIA PROCEDURE NOTES
Peripheral IV  Date/Time: 12/21/2023 7:00 AM      Placement  Needle size: 22 G  Laterality: right  Location: hand  Local anesthetic: injectable  Site prep: alcohol  Attempts: 1

## 2023-12-21 NOTE — CONSULTS
Consults  Acute Pain Service  Mica Vanessa is a 68 y.o. year old female patient who presents for laparoscopic hysterectomy with salpingo-oophorectomy with Dr. Palacio. Acute Pain consulted for block for postoperative pain control.     Anticipated Postop Pain Issues -   Palliative: typically relieved with IV analgesics and regional local anesthetics  Provocative: typically with movement  Quality: typically burning and aching  Radiation: typically none  Severity: typically severe 8-10/10  Timing: typically constant    Past Medical History:   Diagnosis Date    Abnormal ECG     hx of    Arthritis     Khan esophagus     Breast cancer (CMS/HCC)     chemo/radiation right sided lumpectomy    Dysfunctional uterine bleeding     Essential (primary) hypertension 2022    Benign essential hypertension    GERD (gastroesophageal reflux disease)     Hyperlipidemia     Osteopenia     Other seasonal allergic rhinitis     Seasonal allergies    Personal history of malignant neoplasm of breast     History of malignant neoplasm of breast    Personal history of other diseases of the circulatory system     History of hypertension    Personal history of other diseases of the digestive system     History of gastrointestinal disorder        Past Surgical History:   Procedure Laterality Date    LUMBAR PERITONEAL SHUNT      OTHER SURGICAL HISTORY  2022     section    OTHER SURGICAL HISTORY  2022    Tonsillectomy    OTHER SURGICAL HISTORY  2022    Lumpectomy    SKIN CANCER EXCISION  2002    vulva    TUBAL LIGATION          Family History   Problem Relation Name Age of Onset    Osteoporosis Mother      Thyroid disease Mother      Hypertension Father      Stroke Other Grandparent     Hypertension Other Grandparent     Breast cancer Child      Breast cancer Sibling          Social History     Socioeconomic History    Marital status: Legally      Spouse name: Not on file    Number of children: Not on  file    Years of education: Not on file    Highest education level: Not on file   Occupational History    Not on file   Tobacco Use    Smoking status: Former     Types: Cigarettes     Quit date:      Years since quittin.9    Smokeless tobacco: Never   Vaping Use    Vaping Use: Former   Substance and Sexual Activity    Alcohol use: Not Currently    Drug use: Never    Sexual activity: Defer   Other Topics Concern    Not on file   Social History Narrative    Not on file     Social Determinants of Health     Financial Resource Strain: Not on file   Food Insecurity: Not on file   Transportation Needs: Not on file   Physical Activity: Not on file   Stress: Not on file   Social Connections: Not on file   Intimate Partner Violence: Not on file   Housing Stability: Not on file        No Known Allergies      Review of Systems  Gen: No fatigue, anorexia, insomnia, fever.   Eyes: No vision loss, double vision, drainage, eye pain.   ENT: No pharyngitis, dry mouth, no hearing changes or ear discharge  Cardiac: No chest pain, palpitations, syncope, near syncope.   Pulmonary: No shortness of breath, cough, hemoptysis.   Heme/lymph: No swollen glands, fever, bleeding.   GI: No abdominal pain, change in bowel habits, melena, hematemesis, hematochezia, nausea, vomiting, diarrhea.   : No discharge, dysuria, frequency, urgency, hematuria.  Endo: No polyuria or weight loss.   Musculoskeletal: Negative for any pain or loss of ROM/weakness  Skin: No rashes or lesions  Neuro: Normal speech, no numbness or weakness. No gait difficulties  Review of systems is otherwise negative unless stated above or in history of present illness.    Physical Exam:  Constitutional:  no distress, alert and cooperative  Eyes: clear sclera  Head/Neck: No apparent injury, trachea midline  Respiratory/Thorax: Patent airways, thorax symmetric, breathing comfortably  Cardiovascular: no pitting edema  Gastrointestinal: Nondistended  Musculoskeletal: ROM  intact  Extremities: no clubbing  Neurological: alert, [place] x4  Psychological: Appropriate affect    No results found for this or any previous visit (from the past 24 hour(s)).     Plan:    - bilateral quadratus lumborum blocks performed preoperatively on 12/21/23  - Pain medications per primary team  - Will see on POD1 if inpatient    Acute Pain Team  pg [phone] ph [phone].

## 2023-12-21 NOTE — ANESTHESIA PROCEDURE NOTES
Peripheral IV  Date/Time: 12/21/2023 7:45 AM      Placement  Needle size: 18 G  Laterality: left  Location: wrist  Site prep: alcohol  Attempts: 1

## 2023-12-21 NOTE — DISCHARGE INSTRUCTIONS
Gynecologic Surgery Postoperative Instructions    Call Dr. Palacio's office for any problems and/or concerns  *Walk as much as possible, it is ok to use stairs carefully  *Continue the coughing and deep breathing exercises that your learned in the hospital  *No lifting/straining greater than 10 pounds for 6 weeks (avoid strenuous activity)  *Vaginal rest for 6 weeks (Do not put anything in your vagina. Do not use tampons or douches. Do not have sex until cleared by physician.)   *Prevent constipation by using stool softeners and staying hydrated, so that you do not strain against your stitches or have pain from constipation    Call doctor right away for:  *Fever above 100.4/shaking chills  *Bright red vaginal bleeding or bleeding that soaks more than two sanitary pads per hour  *A foul smelling discharge from the vagina  *Inability to urinate  *Severe pain or bloating in your abdomen  *Persistent nausea/vomiting  *Redness, swelling, or drainage at your incision sites  *Chest pain/shortness of breath-call 911.    You will be going home with prescriptions for pain medication. If you are able to take them, alternate a dose of Acetaminophen (Tylenol) and Ibuprofen every 3 hours. (For example, 1000 mg of Tylenol at 09:00am, 600 mg Ibuprofen at 12:00pm, 1000 mg of Tylenol at 3:00pm, 600mg Ibuprofen at 6:00pm). You may also take the two medicines together every 6 hours if that is easier. Use any prescribed narcotic (tramadol or oxycodone) for breakthrough pain as prescribed. Use a stool softener, such as Senna or Miralax to prevent constipation from the narcotic medication. If you are going home with a prescription for estrogen cream, use vaginally as prescribed nightly until your 2 week post-op visit.    Your incisions have surgical steri strips. You may shower with the surgical steri strips in place. Allow warm, soapy water to run over your incisions, then pat to dry. Do not scrub the incisions. The surgical steri  strips will start to peel off on their own in the a couple of days to a week.

## 2023-12-21 NOTE — OP NOTE
Hysterectomy Laparoscopy with Salpingo-Oophorectomy  and cystoscopy, Lymphadenectomy Laparoscopy (B) Operative Note     Date: 2023  OR Location: [facility] OR    Name: Mica Vanessa : 1955, Age: 68 y.o., MRN: [id number], Sex: female    Diagnosis  Pre-op Diagnosis     * Malignant neoplasm of endometrium (CMS/HCC) [C54.1] Post-op Diagnosis     * Malignant neoplasm of endometrium (CMS/HCC) [C54.1]     Procedures  Total laparoscopic hysterectomy bilateral salpingo oophorectomy  Bilateral sentinel lymph node injection and mapping  Left pelvic sentinel lymph node dissection and right complete pelvic lymphadenectomy  Cystourethroscopy     Surgeons      * Nannette Palacio - Primary    Resident/Fellow/Other Assistant:  Surgeon(s) and Role:     * Elizabeth Delgado MD - Fellow    Procedure Summary  Anesthesia: * No anesthesia type entered *  ASA: III  Anesthesia Staff: Anesthesiologist: Jacquie Flores MD  Anesthesia Resident: Cam Haskins MD  Estimated Blood Loss: 25mL  Intra-op Medications:   Medication Name Total Dose   sodium chloride 0.9 % irrigation solution 1,000 mL   surgical lubricant gel 1 Application   sterile water irrigation solution 1,000 mL   indocyanine green (IC-Green) injection 25 mg   heparin (porcine) injection 5,000 Units 5,000 Units              Anesthesia Record               Intraprocedure I/O Totals          Intake    LR 1000.00 mL    Total Intake 1000 mL          Specimen:   ID Type Source Tests Collected by Time   1 : LEFT PELVIC SENTINEL LYMPH NODE Tissue SENTINEL LYMPH NODE, PELVIC SURGICAL PATHOLOGY EXAM Nannette Palacio MD 2023 0837   2 : UTERUS, CERVIX, BILATERAL FALLOPIAN TUBES AND OVARIES Tissue UTERUS, CERVIX, FALLOPIAN TUBES AND OVARIES BILATERAL SURGICAL PATHOLOGY EXAM Nannette Palacio MD 2023 0910   3 : RIGHT PELVIC SENTINAL LYMPH NODE Tissue SENTINEL LYMPH NODE, PELVIC SURGICAL PATHOLOGY EXAM Nannette Palacio MD 2023 0912        Staff:   Circulator: Apple  RAN Seth, PENG; Cyndie Bass, RN  Scrub Person: Lianna Lobo RN; Johnny Handy         Drains and/or Catheters:   [REMOVED] Urethral Catheter Non-latex 16 Fr. (Removed)         Findings: Normal appearing uterus, cervix; bilateral fallopian tubes and ovaries without grossly visible pathology. Smooth uterine serosa and pelvic peritoneal structures. Grossly normal small and large bowel mesenteric and peritoneal surfaces, omentum. Smooth liver contour without visibly evident upper abdominal pathology. Left sentinel lymph node within the obturator space. Failed mapping on the right. Bilateral jets from UO. Bladder dome intact.     Indications: Mica Vanessa is an 68 y.o. female who is having surgery for Malignant neoplasm of endometrium (CMS/HCC) [C54.1].     The patient was seen in the preoperative area. The risks, benefits, complications, treatment options, non-operative alternatives, expected recovery and outcomes were discussed with the patient. The possibilities of reaction to medication, pulmonary aspiration, injury to surrounding structures, bleeding, recurrent infection, the need for additional procedures, failure to diagnose a condition, and creating a complication requiring transfusion or operation were discussed with the patient. The patient concurred with the proposed plan, giving informed consent.  The site of surgery was properly noted/marked if necessary per policy. The patient has been actively warmed in preoperative area. Preoperative antibiotics have been ordered and given within 1 hours of incision. Venous thrombosis prophylaxis have been ordered including bilateral sequential compression devices and chemical prophylaxis    Procedure Details:   After informed consent was confirmed, the patient was taken to the operating room with an IV in place. A preoperative Huddle and timeout were performed, with all OR personnel confirming correct patient and procedure. The patient was moved to the operating room  table and SCDs were placed.  Heparin was administered.  General anesthesia was induced. She was then placed in the dorsal lithotomy position on the operating room table using Jamari stirrups. She was prepped and draped in a normal sterile fashion for a laparoscopic hysterectomy. A surgical pause was performed. The patient's identity and surgical procedure were again confirmed by all the surgical personnel. The patient received preoperative antibiotics.    A tapia catheter was placed. We then started with the vaginal portion of the operation. A bivalve speculum was placed in the vagina, and the cervix was visualized.  Indocyanine green dye was then injected at 3 o’clock and 9 o’clock, 1cc superficially and 1cc deep at each location, for a total of 4cc.  A [employer] system was then placed as a uterine manipulator.  The speculum was then removed.      We then turned our attention to the laparoscopic portion of the operation after donning new sterile gloves. The skin superior to the supraumbilical area was infiltrated with local anesthetic. A 12mm vertical skin incision was made. This was carried down to the level of the fascia with two S retractors. The fascia was elevated with 2 kocher clamps and incised with a scalpel.  Both sides of the fascia were then tagged with 0-vicryl suture.  The peritoneum was then elevated with 2 hemostats and was entered sharply with metzenbaum scissors.  The brook port was then placed. CO2 was then insufflated into the abdomen.     Once this was accomplished, we then carefully inspected the abdomen and there was no evidence of injury. The patient was placed in deep Trendelenburg. We then placed three 5-mm accessory ports.  All were placed under direct visualization.  The small bowel was manipulated out of the pelvis.     We then toggled back and forth from regular view to firefly mode to perform a bilateral sentinel pelvic lymph node dissection. The uterus was placed on traction. The round  ligaments were sealed and divided with the bovie device. The broad ligament was dissected cephalad and caudad, and a bladder flap was created. This was carried to the opposite side, where the round ligament was also divided. The bladder was dissected away from the cervix. The pararectal and paravesical spaces were developed bilaterally.  Lymph nodes were then visualized on the left within the obturator space and removed with a combination of blunt dissection and cautery. We carefully inspected the areas and there was no evidence of bleeding. The obturator and genitofemoral nerves were conserved.  The lymph nodes were removed through the laparoscopic port and sent to pathology for permanent section. Failed mapping on the right.     We then proceeded with the hysterectomy. The infundibulopelvic ligaments were then isolated away from the ureters which were well visualized bilaterally.  A window was created between the two and the IP ligaments were cauterized and then divided with the ligasure device. The anterior and posterior leaves of the broad ligament were bluntly .   At the vesicouterine fold the peritoneum was incised transversely and the bladder sharply dissected off the lower uterine segment and upper vagina.  The posterior peritoneum was gently brought down from the uterus.  The uterine vessels were skeletonized then cauterized and divided with the ligasure device followed by the cardinal ligaments and the uterosacral ligaments in a similar fashion.      We then began the colpotomy. Monopolar bovie was used and the cervix was released circumferentially over the cervical cup.  The uterus, tubes and ovaries and cervix were delivered transvaginally without difficulty. The uterus was bivalved revealing an invasive tumor > 4 cm. Decision made to proceed with a full pelvic lymphadenectomy on the right.     Lymph node bearing adipose tissue along the right external iliac chain was isolated and was dissected  gently medial from the genitofemoral nerve. The ureter and the superior vesical artery were retracted medially, allowing dissection of the remainder of the node bundle from the internal and external iliac vessels, as well as the obturator lymph node packet. The pelvic lymph nodes were then excised and removed through the vagina.      The cuff was closed laparoscopically with 0 Maxon v-loc suture.  Hemostasis was achieved.  The abdomen was copiously irrigated.  Surgicel powder was then placed in the lymph node beds and along the vaginal cuff.  Hemostasis was noted.    The tapia catheter was then removed and we proceeded with cystoscopy. A rigid cystoscope was introduced through the urethra and approximately 200mL of normal saline were used to expand the bladder. After confirmation of placement by visualization in the dome of the bladder, the ureteral orifices were identified bilaterally. Efflux of urine was promptly noted. A surveillance of the bladder was performed and no gross pathology, trauma or visible suture was noted. A straight catheter was performed x1 to drain the bladder. The decision was then made to proceed with closure.     The 5mm ports were all removed under direct visualization.  The 12mm port was removed and the fascia was then closed with another figure-of-eight suture of 0-vicryl followed by tying the two previously-tagged end of fascia together.  The port sites were all irrigated.  Hemostasis was noted.  Ports were closed using 4-0 vicryl in a subcuticular fashion, followed by steristrips.      The tapia was removed and the balloon was removed from the vagina.     Sponge, needle, instrument counts were correct x 2.    Elizabeth Delgado MD MPH   Gynecologic Oncology PGY7  Pager: [phone], Team Phone: [phone]       Complications:  None; patient tolerated the procedure well.    Disposition: PACU - hemodynamically stable.  Condition: stable     Attending Attestation:     Nannette Palacio  Phone Number:  [phone]

## 2023-12-21 NOTE — ANESTHESIA PROCEDURE NOTES
Airway  Date/Time: 12/21/2023 7:43 AM  Urgency: elective    Airway not difficult    Staffing  Performed: resident   Authorized by: Jacquie Flores MD    Performed by: Cam Haskins MD  Patient location during procedure: OR    Indications and Patient Condition  Indications for airway management: anesthesia  Spontaneous ventilation: present  Sedation level: deep  Preoxygenated: yes  MILS maintained throughout  Mask difficulty assessment: 1 - vent by mask  Planned trial extubation    Final Airway Details  Final airway type: endotracheal airway      Successful airway: ETT  Cuffed: yes   Successful intubation technique: direct laryngoscopy  Blade: Emperatriz  Blade size: #3  ETT size (mm): 7.0  Cormack-Lehane Classification: grade I - full view of glottis  Placement verified by: chest auscultation   Measured from: lips  ETT to lips (cm): 22  Number of attempts at approach: 1  Ventilation between attempts: none

## 2023-12-21 NOTE — PROCEDURES
Peripheral Block    Patient location during procedure: pre-op  Start time: 12/21/2023 7:17 AM  End time: 12/21/2023 7:21 AM  Reason for block: at surgeon's request  Staffing  Performed: resident   Authorized by: Shira Doyle MD    Performed by: Shira Doyle MD  Preanesthetic Checklist  Completed: patient identified, IV checked, site marked, risks and benefits discussed, surgical consent, monitors and equipment checked, pre-op evaluation and timeout performed   Timeout performed at: 12/21/2023 7:17 AM  Peripheral Block  Patient position: laying flat  Prep: ChloraPrep  Patient monitoring: heart rate and continuous pulse ox  Block type: QL  Laterality: B/L  Injection technique: single-shot  Guidance: ultrasound guided  Local infiltration: lidocaine  Needle  Needle type: Tuohy   Needle gauge: 26 G  Needle length: 8 cm  Needle localization: ultrasound guidance     image stored in chart  Assessment  Injection assessment: negative aspiration for heme, no paresthesia on injection, incremental injection and local visualized surrounding nerve on ultrasound  Paresthesia pain: none  Heart rate change: no  Slow fractionated injection: yes  Additional Notes  QL single shot. informed consent obtained. risks and benefits discussed. ASA monitors placed, timeout performed. Pt positioned, prepped with chlorhexidine, draped with sterile towels. Ultrasound guidance used with visualization of the needle throughout duration of the procedure. Aspiration was negative. A total of 30 cc 0.5% ropivacaine, 100mcg epinephrine, and 4mg decadron injected between both sides. Patient tolerated procedure well.     Timeout by RN

## 2023-12-21 NOTE — ANESTHESIA PREPROCEDURE EVALUATION
Patient: Mica Vanessa    Procedure Information       Date/Time: 12/21/23 0715    Procedures:       Hysterectomy Laparoscopy with Salpingo-Oophorectomy  kh/11/13      Lymphadenectomy Laparoscopy (Bilateral)    Location: [facility] OR 03 / [facility] OR    Surgeons: Nannette Palacio MD            Relevant Problems   Cardiovascular   (+) Abnormal EKG   (+) Angina pectoris (CMS/HCC)   (+) HTN (hypertension)   (+) HTN (hypertension), benign   (+) Hyperlipemia      GI   (+) Gastroesophageal reflux disease       Clinical information reviewed:   Tobacco  Allergies    Med Hx  Surg Hx   Fam Hx  Soc Hx        NPO Detail:  No data recorded     Physical Exam    Airway  Mallampati: III  TM distance: >3 FB  Neck ROM: full     Cardiovascular - normal exam     Dental - normal exam     Pulmonary - normal exam     Abdominal            Anesthesia Plan    ASA 3     general     The patient is not a current smoker.  Patient was not previously instructed to abstain from smoking on day of procedure.  Patient did not smoke on day of procedure.    intravenous induction   Postoperative administration of opioids is intended.  Anesthetic plan and risks discussed with patient.  Use of blood products discussed with patient who.    Plan discussed with attending and resident.

## 2023-12-21 NOTE — ANESTHESIA POSTPROCEDURE EVALUATION
Patient: Mica Vanessa    Procedure Summary       Date: 12/21/23 Room / Location: [facility] OR 03 / [facility] OR    Anesthesia Start: 0720 Anesthesia Stop:     Procedures:       Hysterectomy Laparoscopy with Salpingo-Oophorectomy  and cystoscopy      Lymphadenectomy Laparoscopy (Bilateral) Diagnosis:       Malignant neoplasm of endometrium (CMS/HCC)      (Malignant neoplasm of endometrium (CMS/HCC) [C54.1])    Surgeons: Nannette Palacio MD Responsible Provider: Jacquie Flores MD    Anesthesia Type: general ASA Status: 3            Anesthesia Type: general    Vitals Value Taken Time   /60 12/21/23 1012   Temp 36.2 12/21/23 1015   Pulse 63 12/21/23 1012   Resp 19 12/21/23 1012   SpO2 98 % 12/21/23 1012   Vitals shown include unvalidated device data.    Anesthesia Post Evaluation    Patient location during evaluation: PACU  Patient participation: complete - patient participated  Level of consciousness: awake and alert  Pain management: satisfactory to patient  Airway patency: patent  Cardiovascular status: blood pressure returned to baseline and acceptable  Respiratory status: acceptable and face mask  Hydration status: acceptable  Postoperative Nausea and Vomiting: none        No notable events documented.

## 2023-12-22 ASSESSMENT — PAIN SCALES - GENERAL: PAINLEVEL_OUTOF10: 5 - MODERATE PAIN

## 2024-01-02 DIAGNOSIS — C54.1 MALIGNANT NEOPLASM OF ENDOMETRIUM (MULTI): Primary | ICD-10-CM

## 2024-01-05 ENCOUNTER — TUMOR BOARD CONFERENCE (OUTPATIENT)
Dept: HEMATOLOGY/ONCOLOGY | Facility: HOSPITAL | Age: 69
End: 2024-01-05
Payer: MEDICARE

## 2024-01-05 DIAGNOSIS — C54.1 MALIGNANT NEOPLASM OF ENDOMETRIUM (MULTI): Primary | ICD-10-CM

## 2024-01-08 ENCOUNTER — OFFICE VISIT (OUTPATIENT)
Dept: GYNECOLOGIC ONCOLOGY | Facility: CLINIC | Age: 69
End: 2024-01-08
Payer: MEDICARE

## 2024-01-08 VITALS
OXYGEN SATURATION: 96 % | SYSTOLIC BLOOD PRESSURE: 137 MMHG | WEIGHT: 142.64 LBS | DIASTOLIC BLOOD PRESSURE: 85 MMHG | BODY MASS INDEX: 26.95 KG/M2 | HEART RATE: 87 BPM | TEMPERATURE: 97.5 F | RESPIRATION RATE: 16 BRPM

## 2024-01-08 DIAGNOSIS — C54.1 MALIGNANT NEOPLASM OF ENDOMETRIUM (MULTI): Primary | ICD-10-CM

## 2024-01-08 PROCEDURE — 1126F AMNT PAIN NOTED NONE PRSNT: CPT | Performed by: NURSE PRACTITIONER

## 2024-01-08 PROCEDURE — 1036F TOBACCO NON-USER: CPT | Performed by: NURSE PRACTITIONER

## 2024-01-08 PROCEDURE — 3075F SYST BP GE 130 - 139MM HG: CPT | Performed by: NURSE PRACTITIONER

## 2024-01-08 PROCEDURE — 3079F DIAST BP 80-89 MM HG: CPT | Performed by: NURSE PRACTITIONER

## 2024-01-08 PROCEDURE — 1159F MED LIST DOCD IN RCRD: CPT | Performed by: NURSE PRACTITIONER

## 2024-01-08 PROCEDURE — 99024 POSTOP FOLLOW-UP VISIT: CPT | Performed by: NURSE PRACTITIONER

## 2024-01-08 ASSESSMENT — PAIN SCALES - GENERAL: PAINLEVEL: 0-NO PAIN

## 2024-01-08 NOTE — LETTER
Date: 2024  RE:  Mica Vanessa  :  1955      To Whom It May Concern:    Mica, has been under our care and now may return back to work with the following restrictions: no lifting, pushing, or pulling greater than 10 pounds until 24.    Their return to work date is:  24    If you have questions concerning this patient's immediate care, please feel free to contact our office at [phone].    Sincerely,              Kristin Soares, SANDHYA-CNP

## 2024-01-08 NOTE — PROGRESS NOTES
"Patient ID: Mica Vanessa is a 68 y.o. female.  Referring Physician: No referring provider defined for this encounter.  Primary Care Provider: Nurys Hector MD      Subjective    Referred by Dr. 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 Eating and drinking without any issues. Reports regular bowel habits. Denies any incisional pain or drainage. No lower extremity edema. 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     PMH  Breast cancer -required surgery chemotherapy and radiation  Hypertension  Arthritis    PSH  C/s X 3  Removal of vulvar SCC  Lumpectomy  OB/Gyn hx  G3, P3  x3.  No prior history of abnormal Pap.  Menopausal since her 50s    FH  Sister and daughter both had breast cancer.  They both had genetic testing which was negative.  The patient also had breast cancer but has not had genetic testing.  Feels cancer could be related to exposure to chemicals in her youth.    SH  Former smoker denies alcohol or illicit drug use.  Lives alone.  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 Her smoking use included cigarettes. She has never used smokeless tobacco.  She  reports that she does not currently use alcohol.  She  reports no history of drug use.    Physical Exam  Constitutional:       Appearance: Normal appearance.   HENT:      Head: Normocephalic and atraumatic.      Nose: Nose normal.      Mouth/Throat:      Mouth: Mucous membranes are moist.   Eyes:      Conjunctiva/sclera: Conjunctivae normal.      Pupils: Pupils are equal, round, and reactive to light.   Cardiovascular:      Rate and Rhythm: Normal rate and regular rhythm.      Pulses: Normal pulses.   Pulmonary:      Effort: Pulmonary effort is normal.      Breath sounds: Normal breath sounds.   Abdominal:      General: There is no distension.      Palpations: Abdomen is soft. There is no mass.      Tenderness: There is no abdominal tenderness.      Hernia: No hernia is present.      Comments: Lap sites healing well, steri strips still in place   Genitourinary:     Adnexa:         Right: No mass.          Left: No mass.        Comments: deferred  Musculoskeletal:         General: No swelling.      Right lower leg: No edema.      Left lower leg: No edema.   Skin:     General: Skin is warm and dry.   Neurological:      General: No focal deficit present.      Mental Status: She is alert.   Psychiatric:         Mood and Affect: Mood normal.         Thought Content: Thought content normal.         Performance Status:  Asymptomatic    Assessment/Plan    68 year old female with biopsy proven uterine cancer presenting for post op s/p TLH, BSO, BSLN injection and mapping, left pelvic sentinel lymph node dissection and right complete pelvic lymphadenectomy, cystourethroscopy on 12/21. Pathology pending.    # Post op  - Recovering well  - Reviewed ongoing restrictions (no lifting more than 10-15lb, nothing per vagina, no soaking)  - Ok to return to work as \"recess monitor,\" " provided with a RTW note per patient's request. Reviewed no heavy lifting/pushing/pulling.   - Phone visit in 1 week to review pathology

## 2024-01-10 LAB
LAB AP ASR DISCLAIMER: NORMAL
LABORATORY COMMENT REPORT: NORMAL
PATH REPORT.FINAL DX SPEC: NORMAL
PATH REPORT.GROSS SPEC: NORMAL
PATH REPORT.RELEVANT HX SPEC: NORMAL
PATH REPORT.TOTAL CANCER: NORMAL
PATHOLOGY SYNOPTIC REPORT: NORMAL

## 2024-01-10 PROCEDURE — 88341 IMHCHEM/IMCYTCHM EA ADD ANTB: CPT | Performed by: STUDENT IN AN ORGANIZED HEALTH CARE EDUCATION/TRAINING PROGRAM

## 2024-01-10 PROCEDURE — 88342 IMHCHEM/IMCYTCHM 1ST ANTB: CPT | Performed by: STUDENT IN AN ORGANIZED HEALTH CARE EDUCATION/TRAINING PROGRAM

## 2024-01-11 NOTE — TUMOR BOARD NOTE
Gynecologic Oncology Tumor Board 2024    Specialties Present: Gyn Onc, Radiology, Genetics, Radiation oncology, Pathology, Nurse Navigator, Research    Mica Vanessa  68 y.o.    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 high grade)  Stage: IB    We reviewed previous medical and familial history, history of present illness, and recent lab results along with all available histopathologic and imaging studies. The tumor board considered available treatment options and made the following recommendations.    Recommendations:     Referral to Radiation Oncology (EBRT and/or vaginal brachytherapy) +/- systemic therapy, genetics referral for family history and personal history   Clinical Trial Eligibility: None available       National site-specific guidelines were discussed with respect to the case. It is recognized that there may be additional factors not discussed in the Review Board discussion that can influence management decisions, and alternative management options which fall within the standard of care. Accordingly the final treatment disposition will be determined by the patient, in the context of an informed discussion with their providers. The actual prescribed management or treatment plan may or may not be consistent with the Review Board recommendations.

## 2024-01-12 ENCOUNTER — TUMOR BOARD CONFERENCE (OUTPATIENT)
Dept: HEMATOLOGY/ONCOLOGY | Facility: HOSPITAL | Age: 69
End: 2024-01-12
Payer: MEDICARE

## 2024-01-16 ENCOUNTER — APPOINTMENT (OUTPATIENT)
Dept: GYNECOLOGIC ONCOLOGY | Facility: HOSPITAL | Age: 69
End: 2024-01-16
Payer: MEDICARE

## 2024-01-16 ENCOUNTER — TELEMEDICINE (OUTPATIENT)
Dept: GYNECOLOGIC ONCOLOGY | Facility: HOSPITAL | Age: 69
End: 2024-01-16
Payer: MEDICARE

## 2024-01-16 DIAGNOSIS — C54.1 MALIGNANT NEOPLASM OF ENDOMETRIUM (MULTI): Primary | ICD-10-CM

## 2024-01-16 PROCEDURE — 99024 POSTOP FOLLOW-UP VISIT: CPT | Performed by: OBSTETRICS & GYNECOLOGY

## 2024-01-16 RX ORDER — SENNOSIDES 8.6 MG/1
1 TABLET ORAL DAILY
Qty: 30 TABLET | Refills: 11 | Status: SHIPPED | OUTPATIENT
Start: 2024-01-16 | End: 2025-01-15

## 2024-01-16 NOTE — PROGRESS NOTES
Patient ID: Mica Vanessa is a 68 y.o. female.  Referring Physician: No referring provider defined for this encounter.  Primary Care Provider: Nurys Hector MD    Subjective    Referred by Dr. Parkinson    67 yo with uterine cancer    Cancer history:  10/6/23 EMB w/ uterine cancer, mesonephric type  10/21/23 Total laparoscopic hysterectomy bilateral salpingo oophorectomy, Bilateral sentinel lymph node injection and mapping, Left pelvic sentinel lymph node dissection and right complete pelvic lymphadenectomy, Cystourethroscopy.  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 MMRp, P53 alison type. TB recommendations for radiation oncology consult, genetics referral, consider systemic therapy      PMH  Breast cancer -required surgery chemotherapy and radiation  Hypertension  Arthritis    PSH  C/s X 3  Removal of vulvar SCC  Lumpectomy  OB/Gyn hx  G3, P3  x3.  No prior history of abnormal Pap.  Menopausal since her 50s    FH  Sister and daughter both had breast cancer.  They both had genetic testing which was negative.  The patient also had breast cancer but has not had genetic testing.  Feels cancer could be related to exposure to chemicals in her youth.    SH  Former smoker denies alcohol or illicit drug use.  Lives alone.  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  Needs refill of senna          Objective    BSA: There is no height or weight on file to calculate BSA.  There were no vitals taken for this visit.     Physical Exam    Performance Status:  Symptomatic; fully ambulatory    Assessment/Plan     Oncology History    No history exists.        Problem List Items Addressed This Visit             ICD-10-CM    Malignant neoplasm of endometrium (CMS/HCC) - Primary C54.1    Relevant Medications    sennosides (Senokot) 8.6 mg tablet    Other Relevant Orders    Referral to Radiation Oncology       Treatment Plans        No treatment plans exist          Discussed pathology and TB recs.  Referral to radiation oncology to consider pelvic RT and/or brachytherapy.  Expectations discussed.  Follow up 4-6 weeks in  person visit.    I spent 15 min in discussion with the patient.

## 2024-01-23 ENCOUNTER — TELEPHONE (OUTPATIENT)
Dept: RADIATION ONCOLOGY | Facility: HOSPITAL | Age: 69
End: 2024-01-23
Payer: MEDICARE

## 2024-01-24 ENCOUNTER — HOSPITAL ENCOUNTER (OUTPATIENT)
Dept: RADIATION ONCOLOGY | Facility: HOSPITAL | Age: 69
Setting detail: RADIATION/ONCOLOGY SERIES
Discharge: HOME | End: 2024-01-24
Payer: MEDICARE

## 2024-01-24 ENCOUNTER — LAB (OUTPATIENT)
Dept: LAB | Facility: HOSPITAL | Age: 69
End: 2024-01-24
Payer: MEDICARE

## 2024-01-24 VITALS
RESPIRATION RATE: 18 BRPM | OXYGEN SATURATION: 95 % | TEMPERATURE: 97.5 F | WEIGHT: 143 LBS | HEART RATE: 117 BPM | SYSTOLIC BLOOD PRESSURE: 139 MMHG | BODY MASS INDEX: 27 KG/M2 | DIASTOLIC BLOOD PRESSURE: 83 MMHG | HEIGHT: 61 IN

## 2024-01-24 DIAGNOSIS — C54.1 MALIGNANT NEOPLASM OF ENDOMETRIUM (MULTI): ICD-10-CM

## 2024-01-24 DIAGNOSIS — C54.1 MALIGNANT NEOPLASM OF ENDOMETRIUM (MULTI): Primary | ICD-10-CM

## 2024-01-24 LAB
ANION GAP SERPL CALC-SCNC: 13 MMOL/L (ref 10–20)
BUN SERPL-MCNC: 18 MG/DL (ref 6–23)
CALCIUM SERPL-MCNC: 10.2 MG/DL (ref 8.6–10.3)
CHLORIDE SERPL-SCNC: 104 MMOL/L (ref 98–107)
CO2 SERPL-SCNC: 26 MMOL/L (ref 21–32)
CREAT SERPL-MCNC: 0.57 MG/DL (ref 0.5–1.05)
EGFRCR SERPLBLD CKD-EPI 2021: >90 ML/MIN/1.73M*2
GLUCOSE SERPL-MCNC: 122 MG/DL (ref 74–99)
POTASSIUM SERPL-SCNC: 3.7 MMOL/L (ref 3.5–5.3)
SODIUM SERPL-SCNC: 139 MMOL/L (ref 136–145)

## 2024-01-24 PROCEDURE — 80048 BASIC METABOLIC PNL TOTAL CA: CPT

## 2024-01-24 PROCEDURE — 99215 OFFICE O/P EST HI 40 MIN: CPT | Performed by: STUDENT IN AN ORGANIZED HEALTH CARE EDUCATION/TRAINING PROGRAM

## 2024-01-24 PROCEDURE — 36415 COLL VENOUS BLD VENIPUNCTURE: CPT

## 2024-01-24 PROCEDURE — 99205 OFFICE O/P NEW HI 60 MIN: CPT | Performed by: STUDENT IN AN ORGANIZED HEALTH CARE EDUCATION/TRAINING PROGRAM

## 2024-01-24 ASSESSMENT — ENCOUNTER SYMPTOMS
OCCASIONAL FEELINGS OF UNSTEADINESS: 0
NEUROLOGICAL NEGATIVE: 1
LOSS OF SENSATION IN FEET: 0
CONSTITUTIONAL NEGATIVE: 1
GASTROINTESTINAL NEGATIVE: 1
DEPRESSION: 0
CARDIOVASCULAR NEGATIVE: 1
MUSCULOSKELETAL NEGATIVE: 1
RESPIRATORY NEGATIVE: 1

## 2024-01-24 ASSESSMENT — PATIENT HEALTH QUESTIONNAIRE - PHQ9
1. LITTLE INTEREST OR PLEASURE IN DOING THINGS: NOT AT ALL
SUM OF ALL RESPONSES TO PHQ9 QUESTIONS 1 AND 2: 0
2. FEELING DOWN, DEPRESSED OR HOPELESS: NOT AT ALL

## 2024-01-24 ASSESSMENT — COLUMBIA-SUICIDE SEVERITY RATING SCALE - C-SSRS
1. IN THE PAST MONTH, HAVE YOU WISHED YOU WERE DEAD OR WISHED YOU COULD GO TO SLEEP AND NOT WAKE UP?: NO
2. HAVE YOU ACTUALLY HAD ANY THOUGHTS OF KILLING YOURSELF?: NO
6. HAVE YOU EVER DONE ANYTHING, STARTED TO DO ANYTHING, OR PREPARED TO DO ANYTHING TO END YOUR LIFE?: NO

## 2024-01-24 NOTE — PROGRESS NOTES
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 Intermediate score on Oncotype DX. 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 Pt originally presented with PMB ~1 year ago. EMB was non diagnostic. Subsequent polypectomy 10/06/2023 consistent with endometrial adenocarcinoma, mesonephric like type, MMR intact, p53 wt. Pt then underwent LSC Hyst BSO 2023. Pathology confirmed mesonephric type " endometrial carcinoma; >50% MI with extensive LVSI. Of note, patient also has history of breast cancer; she had a restaging CT c/a/p for breast cancer 2023 where no abnormalities were seen.    Since her surgery she has been doing well. Did shovel surgery and noticed mild vaginal spotting afterwards. No vaginal bleeding since.     Fatigue  [] None   [x] Grade 1 fatigue: Relieved by rest   [] Grade 2 fatigue: Not relieved by rest; limiting instrumental ADL  [] Grade 3 fatigue: Not relieved by rest, limiting self care ADL    GI:  Bowel complaints: she is prone to constipation  -Bleeding no  -Frequency no  -Incontinence no  -Pain no    :  Urinary complaints  -Bleeding no  -Frequency no  -Incontinence +VLAD (mild)  Mild dysuria after her surgery which has now resolved    Sexually active: robe Palacio referred the patient for evaluation and treatment for radiation.   I personally reviewed the available outside records and imaging studies.    Ms. Vanessa has mesnoephric type endometrial cancer cancer, which left untreated can lead to progression and can be life threatening.        3  Smoking intermittent    A >10 point review of systems was performed and was negative unless mentioned in the Interval History above.    Prior Radiotherapy:  Yes, describe: prior breast radiation therapy; she has not had any prior pelvic radiation therapy      Current Systemic Treatment:  No     Presence of Pacemaker or ICD:  No    Past Medical History:    Past Medical History:   Diagnosis Date    Abnormal ECG     hx of    Arthritis     Khan esophagus     Breast cancer (CMS/HCC)     chemo/radiation right sided lumpectomy    Dysfunctional uterine bleeding     Essential (primary) hypertension 2022    Benign essential hypertension    GERD (gastroesophageal reflux disease)     Hyperlipidemia     Osteopenia     Other seasonal allergic rhinitis     Seasonal allergies    Personal history of malignant neoplasm  of breast     History of malignant neoplasm of breast    Personal history of other diseases of the circulatory system     History of hypertension    Personal history of other diseases of the digestive system     History of gastrointestinal disorder        Past Surgical History:    Past Surgical History:   Procedure Laterality Date    LUMBAR PERITONEAL SHUNT      OTHER SURGICAL HISTORY  2022     section    OTHER SURGICAL HISTORY  2022    Tonsillectomy    OTHER SURGICAL HISTORY  2022    Lumpectomy    SKIN CANCER EXCISION  2002    vulva    TUBAL LIGATION          Family History:  Cancer-related family history includes Breast cancer in her child and sibling.    Social History:    Social History     Tobacco Use    Smoking status: Former     Types: Cigarettes     Quit date:      Years since quittin.0    Smokeless tobacco: Never   Vaping Use    Vaping Use: Former   Substance Use Topics    Alcohol use: Not Currently    Drug use: Never       Allergies:  No Known Allergies     Medications:    Current Outpatient Medications:     calcium carbonate (Oscal) 500 mg calcium (1,250 mg) tablet, Take 1 tablet (1,250 mg) by mouth once daily., Disp: , Rfl:     esomeprazole (NexIUM) 20 mg DR capsule, Take 1 capsule (20 mg) by mouth once daily., Disp: , Rfl:     fluticasone (Flonase) 50 mcg/actuation nasal spray, Administer into affected nostril(s). Administer into affected nostril(s)., Disp: , Rfl:     gemfibrozil (Lopid) 600 mg tablet, Take 1 tablet (600 mg) by mouth once daily., Disp: , Rfl:     lisinopril 20 mg tablet, Take 1 tablet (20 mg) by mouth once daily., Disp: , Rfl:     multivitamin (Daily Multi-Vitamin) tablet, Take 1 tablet by mouth once daily., Disp: , Rfl:     pseudoephedrine HCl (SUDAFED 12 HOUR ORAL), Take by mouth., Disp: , Rfl:     sennosides (Senokot) 8.6 mg tablet, Take 1 tablet (8.6 mg) by mouth once daily., Disp: 30 tablet, Rfl: 11      Review of Systems:  Review of Systems  "  Constitutional: Negative.    HENT:  Negative.     Respiratory: Negative.     Cardiovascular: Negative.    Gastrointestinal: Negative.    Genitourinary: Negative.     Musculoskeletal: Negative.    Neurological: Negative.      The patient's current pain level was assessed.  They report currently having a pain of 0 out of 10.  They feel their pain is under control without the use of pain medications.      Performance Status:  The Karnofsky performance scale today is 90, Able to carry on normal activity; minor signs or symptoms of disease (ECOG equivalent 0).        OBJECTIVE  Physical Exam:  /83   Pulse (!) 117   Temp 36.4 °C (97.5 °F) (Temporal)   Resp 18   Ht 1.549 m (5' 1\")   Wt 64.9 kg (143 lb)   SpO2 95%   BMI 27.02 kg/m²      Gen: NAD appearing, in NAD  ENT: Eyes symmetric  Resp: Breathing comfortably in room air  CV: Normal perfusion  Abd: Soft and non-distended    Gyn:   External genitalia: Normal external genitalia, no vulvar masses or lesions  Vagina: , blood, discharge  BME:  -Uterus is anteverted  -s/p hysterectomy: cervix, uterus and adnexae surgically absent  SSE:   -Vaginal cuff well healed    Extremities: symmetric  Skin: Normal      Laboratory Review:  There are no laboratory contraindications to radiation therapy.    The pertinent lab results were reviewed and discussed with the patient.  Notably,   12/06/2023 WBC 7.4, hgb 14.5, plt 282, Cr 0.64    Pathology Review:  The pertinent pathology results were reviewed and discussed with the patient.     10/06/2023 Endometrial polypectomy        12/21/2023      Imaging:  The pertinent imaging results were reviewed and discussed with the patient.  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 She presents today for evaluation of adjuvant radiation therapy.     We reviewed the role and rationale for adjuvant radiotherapy (VBT and/or EBRT) in the management of endometrial carcinoma. We discussed the benefits of VBT with regard to reducing the risk of vaginal cuff failures while not impacting pelvic recurrence and EBRT in reducing the rates of vaginal and pelvic recurrence rate. We discussed the logistics and comparative toxicities between the two treatment modalities including, but not limited to bowel, bladder and vaginal toxicity.     Specifically in her case, we discussed the rarity of a diagnosis of mesonephric endometrial carcinoma. We discussed the multi-institutional trial published by Jasmin et al (2021), which demonstrated 59% of patients recurred. With a 21% recurrence to the pelvis. Discussed with Dr. Palacio possibility of proceeding with concurrent vs sequential chemotherapy. Given that she was early stage, will proceed with restaging after RT and proceed accordingly.     I recommended that we proceed with pelvic radiation therapy. She expressed understanding and agreement with the plan.      PLAN:   -EBRT 45 Gy to pelvis in 25 fractions  -Labs    NCCN Guidelines were applicable to guide this patients treatment plan.   Ruma Shabazz MD

## 2024-01-25 ENCOUNTER — TELEPHONE (OUTPATIENT)
Dept: GYNECOLOGIC ONCOLOGY | Facility: HOSPITAL | Age: 69
End: 2024-01-25
Payer: MEDICARE

## 2024-01-25 NOTE — TELEPHONE ENCOUNTER
Patient called requesting to speak with Dr. Palacio regarding treatment recommendations and questions regarding cancer diagnosis/type.      Phone visit scheduled for 1/29/24 at 11am, phoned patient to notify, message left on patient voice mail.

## 2024-01-29 ENCOUNTER — TELEMEDICINE (OUTPATIENT)
Dept: GYNECOLOGIC ONCOLOGY | Facility: CLINIC | Age: 69
End: 2024-01-29
Payer: MEDICARE

## 2024-01-29 DIAGNOSIS — C54.1 MALIGNANT NEOPLASM OF ENDOMETRIUM (MULTI): Primary | ICD-10-CM

## 2024-01-29 PROCEDURE — 99212 OFFICE O/P EST SF 10 MIN: CPT | Performed by: OBSTETRICS & GYNECOLOGY

## 2024-01-29 NOTE — PROGRESS NOTES
Consent:  Verbal consent was requested and obtained from patient on this date for a telehealth visit.    Patient ID: Mica Vanessa is a 68 y.o. female.  Referring Physician: No referring provider defined for this encounter.  Primary Care Provider: Nurys Hector MD    Subjective    Referred by Dr. Parkinson    69 yo with uterine cancer    Cancer history:  10/6/23 EMB w/ uterine cancer, mesonephric type  10/21/23 Total laparoscopic hysterectomy bilateral salpingo oophorectomy, Bilateral sentinel lymph node injection and mapping, Left pelvic sentinel lymph node dissection and right complete pelvic lymphadenectomy, Cystourethroscopy.  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 MMRp, P53 alison type. TB recommendations for radiation oncology consult, genetics referral, consider systemic therapy      PMH  Breast cancer -required surgery chemotherapy and radiation  Hypertension  Arthritis    PSH  C/s X 3  Removal of vulvar SCC  Lumpectomy  OB/Gyn hx  G3, P3  x3.  No prior history of abnormal Pap.  Menopausal since her 50s    FH  Sister and daughter both had breast cancer.  They both had genetic testing which was negative.  The patient also had breast cancer but has not had genetic testing.  Feels cancer could be related to exposure to chemicals in her youth.    SH  Former smoker denies alcohol or illicit drug use.  Lives alone.  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 Shabazz    Worried about aggressive history - chemo vs. radiation            Objective    BSA: There is no height or weight on file to calculate BSA.  There were no vitals taken for this visit.     Physical Exam    Performance Status:  Asymptomatic    Assessment/Plan    Oncology History    No history exists.     Cancer Staging   No matching staging information was found for the patient.     Problem List Items Addressed This Visit              ICD-10-CM    Malignant neoplasm of endometrium (CMS/HCC) - Primary C54.1        Treatment Plans       No treatment plans exist        Discussed TB recs.  Discussed use of adjuvant chemotherapy after pelvic RT.  May consider 4 cycles carbo/taxol.  Will reassess after RT.  I spent 15 min in telephone discussion with the patient.

## 2024-01-30 ENCOUNTER — TELEPHONE (OUTPATIENT)
Dept: RADIATION ONCOLOGY | Facility: HOSPITAL | Age: 69
End: 2024-01-30
Payer: MEDICARE

## 2024-01-31 ENCOUNTER — HOSPITAL ENCOUNTER (OUTPATIENT)
Dept: RADIATION ONCOLOGY | Facility: HOSPITAL | Age: 69
Setting detail: RADIATION/ONCOLOGY SERIES
Discharge: HOME | End: 2024-01-31
Payer: MEDICARE

## 2024-01-31 ENCOUNTER — HOSPITAL ENCOUNTER (OUTPATIENT)
Dept: RADIOLOGY | Facility: EXTERNAL LOCATION | Age: 69
Discharge: HOME | End: 2024-01-31

## 2024-01-31 DIAGNOSIS — C54.0 MALIGNANT NEOPLASM OF ISTHMUS (MULTI): ICD-10-CM

## 2024-01-31 DIAGNOSIS — C54.0 MALIGNANT NEOPLASM OF ISTHMUS (MULTI): Primary | ICD-10-CM

## 2024-02-01 ENCOUNTER — APPOINTMENT (OUTPATIENT)
Dept: RADIATION ONCOLOGY | Facility: HOSPITAL | Age: 69
End: 2024-02-01
Payer: MEDICARE

## 2024-02-01 PROCEDURE — 77263 THER RADIOLOGY TX PLNG CPLX: CPT | Performed by: STUDENT IN AN ORGANIZED HEALTH CARE EDUCATION/TRAINING PROGRAM

## 2024-02-05 ENCOUNTER — APPOINTMENT (OUTPATIENT)
Dept: GYNECOLOGIC ONCOLOGY | Facility: CLINIC | Age: 69
End: 2024-02-05
Payer: MEDICARE

## 2024-02-09 ENCOUNTER — HOSPITAL ENCOUNTER (OUTPATIENT)
Dept: RADIATION ONCOLOGY | Facility: HOSPITAL | Age: 69
Setting detail: RADIATION/ONCOLOGY SERIES
Discharge: HOME | End: 2024-02-09
Payer: MEDICARE

## 2024-02-09 PROCEDURE — 77301 RADIOTHERAPY DOSE PLAN IMRT: CPT | Performed by: STUDENT IN AN ORGANIZED HEALTH CARE EDUCATION/TRAINING PROGRAM

## 2024-02-09 PROCEDURE — 77300 RADIATION THERAPY DOSE PLAN: CPT | Performed by: STUDENT IN AN ORGANIZED HEALTH CARE EDUCATION/TRAINING PROGRAM

## 2024-02-09 PROCEDURE — 77338 DESIGN MLC DEVICE FOR IMRT: CPT | Performed by: STUDENT IN AN ORGANIZED HEALTH CARE EDUCATION/TRAINING PROGRAM

## 2024-02-13 ENCOUNTER — HOSPITAL ENCOUNTER (OUTPATIENT)
Dept: RADIATION ONCOLOGY | Facility: HOSPITAL | Age: 69
Setting detail: RADIATION/ONCOLOGY SERIES
Discharge: HOME | End: 2024-02-13
Payer: MEDICARE

## 2024-02-13 DIAGNOSIS — Z51.0 ENCOUNTER FOR ANTINEOPLASTIC RADIATION THERAPY: ICD-10-CM

## 2024-02-13 DIAGNOSIS — C54.0 MALIGNANT NEOPLASM OF ISTHMUS UTERI (MULTI): ICD-10-CM

## 2024-02-13 LAB
RAD ONC MSQ ACTUAL FRACTIONS DELIVERED: 1
RAD ONC MSQ ACTUAL SESSION DELIVERED DOSE: 180 CGRAY
RAD ONC MSQ ACTUAL TOTAL DOSE: 180 CGRAY
RAD ONC MSQ ELAPSED DAYS: 0
RAD ONC MSQ LAST DATE: NORMAL
RAD ONC MSQ PRESCRIBED FRACTIONAL DOSE: 180 CGRAY
RAD ONC MSQ PRESCRIBED NUMBER OF FRACTIONS: 25
RAD ONC MSQ PRESCRIBED TECHNIQUE: NORMAL
RAD ONC MSQ PRESCRIBED TOTAL DOSE: 4500 CGRAY
RAD ONC MSQ PRESCRIPTION PATTERN COMMENT: NORMAL
RAD ONC MSQ START DATE: NORMAL
RAD ONC MSQ TREATMENT COURSE NUMBER: 1
RAD ONC MSQ TREATMENT SITE: NORMAL

## 2024-02-13 PROCEDURE — 77386 HC INTENSITY-MODULATED RADIATION THERAPY (IMRT), COMPLEX: CPT | Performed by: STUDENT IN AN ORGANIZED HEALTH CARE EDUCATION/TRAINING PROGRAM

## 2024-02-13 PROCEDURE — 77014 CHG CT GUIDANCE RADIATION THERAPY FLDS PLACEMENT: CPT | Performed by: STUDENT IN AN ORGANIZED HEALTH CARE EDUCATION/TRAINING PROGRAM

## 2024-02-14 ENCOUNTER — HOSPITAL ENCOUNTER (OUTPATIENT)
Dept: RADIATION ONCOLOGY | Facility: HOSPITAL | Age: 69
Setting detail: RADIATION/ONCOLOGY SERIES
Discharge: HOME | End: 2024-02-14
Payer: MEDICARE

## 2024-02-14 DIAGNOSIS — Z51.0 ENCOUNTER FOR ANTINEOPLASTIC RADIATION THERAPY: ICD-10-CM

## 2024-02-14 DIAGNOSIS — C54.0 MALIGNANT NEOPLASM OF ISTHMUS UTERI (MULTI): ICD-10-CM

## 2024-02-14 LAB
RAD ONC MSQ ACTUAL FRACTIONS DELIVERED: 2
RAD ONC MSQ ACTUAL SESSION DELIVERED DOSE: 180 CGRAY
RAD ONC MSQ ACTUAL TOTAL DOSE: 360 CGRAY
RAD ONC MSQ ELAPSED DAYS: 1
RAD ONC MSQ LAST DATE: NORMAL
RAD ONC MSQ PRESCRIBED FRACTIONAL DOSE: 180 CGRAY
RAD ONC MSQ PRESCRIBED NUMBER OF FRACTIONS: 25
RAD ONC MSQ PRESCRIBED TECHNIQUE: NORMAL
RAD ONC MSQ PRESCRIBED TOTAL DOSE: 4500 CGRAY
RAD ONC MSQ PRESCRIPTION PATTERN COMMENT: NORMAL
RAD ONC MSQ START DATE: NORMAL
RAD ONC MSQ TREATMENT COURSE NUMBER: 1
RAD ONC MSQ TREATMENT SITE: NORMAL

## 2024-02-14 PROCEDURE — 77386 HC INTENSITY-MODULATED RADIATION THERAPY (IMRT), COMPLEX: CPT | Performed by: STUDENT IN AN ORGANIZED HEALTH CARE EDUCATION/TRAINING PROGRAM

## 2024-02-14 PROCEDURE — 77014 CHG CT GUIDANCE RADIATION THERAPY FLDS PLACEMENT: CPT | Performed by: STUDENT IN AN ORGANIZED HEALTH CARE EDUCATION/TRAINING PROGRAM

## 2024-02-14 PROCEDURE — 77336 RADIATION PHYSICS CONSULT: CPT | Performed by: STUDENT IN AN ORGANIZED HEALTH CARE EDUCATION/TRAINING PROGRAM

## 2024-02-15 ENCOUNTER — HOSPITAL ENCOUNTER (OUTPATIENT)
Dept: RADIATION ONCOLOGY | Facility: HOSPITAL | Age: 69
Setting detail: RADIATION/ONCOLOGY SERIES
Discharge: HOME | End: 2024-02-15
Payer: MEDICARE

## 2024-02-15 ENCOUNTER — TELEPHONE (OUTPATIENT)
Dept: RADIATION ONCOLOGY | Facility: HOSPITAL | Age: 69
End: 2024-02-15
Payer: MEDICARE

## 2024-02-15 ENCOUNTER — RADIATION ONCOLOGY OTV (OUTPATIENT)
Dept: RADIATION ONCOLOGY | Facility: HOSPITAL | Age: 69
End: 2024-02-15
Payer: MEDICARE

## 2024-02-15 VITALS
HEART RATE: 70 BPM | RESPIRATION RATE: 18 BRPM | WEIGHT: 140 LBS | DIASTOLIC BLOOD PRESSURE: 79 MMHG | HEIGHT: 61 IN | SYSTOLIC BLOOD PRESSURE: 124 MMHG | TEMPERATURE: 97.9 F | OXYGEN SATURATION: 97 % | BODY MASS INDEX: 26.43 KG/M2

## 2024-02-15 DIAGNOSIS — Z51.0 ENCOUNTER FOR ANTINEOPLASTIC RADIATION THERAPY: ICD-10-CM

## 2024-02-15 DIAGNOSIS — C54.0 MALIGNANT NEOPLASM OF ISTHMUS UTERI (MULTI): ICD-10-CM

## 2024-02-15 LAB
RAD ONC MSQ ACTUAL FRACTIONS DELIVERED: 3
RAD ONC MSQ ACTUAL SESSION DELIVERED DOSE: 180 CGRAY
RAD ONC MSQ ACTUAL TOTAL DOSE: 540 CGRAY
RAD ONC MSQ ELAPSED DAYS: 2
RAD ONC MSQ LAST DATE: NORMAL
RAD ONC MSQ PRESCRIBED FRACTIONAL DOSE: 180 CGRAY
RAD ONC MSQ PRESCRIBED NUMBER OF FRACTIONS: 25
RAD ONC MSQ PRESCRIBED TECHNIQUE: NORMAL
RAD ONC MSQ PRESCRIBED TOTAL DOSE: 4500 CGRAY
RAD ONC MSQ PRESCRIPTION PATTERN COMMENT: NORMAL
RAD ONC MSQ START DATE: NORMAL
RAD ONC MSQ TREATMENT COURSE NUMBER: 1
RAD ONC MSQ TREATMENT SITE: NORMAL

## 2024-02-15 PROCEDURE — 77386 HC INTENSITY-MODULATED RADIATION THERAPY (IMRT), COMPLEX: CPT | Performed by: STUDENT IN AN ORGANIZED HEALTH CARE EDUCATION/TRAINING PROGRAM

## 2024-02-15 PROCEDURE — 77014 CHG CT GUIDANCE RADIATION THERAPY FLDS PLACEMENT: CPT | Performed by: STUDENT IN AN ORGANIZED HEALTH CARE EDUCATION/TRAINING PROGRAM

## 2024-02-15 PROCEDURE — 77427 RADIATION TX MANAGEMENT X5: CPT | Performed by: RADIOLOGY

## 2024-02-15 NOTE — TELEPHONE ENCOUNTER
Patient had on treatment visit earlier today with Dr. Santiago(covering Dr. Shabazz) and reported she had 1 episode of mild nausea after first fraction of radiation on 2/13/24. Episode resolved without antiemetic. Plan was for me to report to Dr. Shabazz to confer about management.  Per Dr. Shabazz she expects very little to no nausea given pt's field of pelvic radiation, she does not wish for patient to take daily antiemetic at this time and  will review at next visit on 2/20/24.  Message left  for  patient regarding Dr. Shabazz's recommendation  and she was instructed to report if she has any additional episodes of nausea.

## 2024-02-15 NOTE — PROGRESS NOTES
"Radiation Oncology On Treatment Visit    Patient Name:  Mica Vanessa  MRN:  [id number]  :  1955    Referring Provider: No ref. provider found  Primary Care Provider: Nurys Hector MD  Care Team: Patient Care Team:  Nurys Hector MD as PCP - General  Nannette Palacio MD as Obstetrician (Obstetrics and Gynecology)  Kris Plata MD as Consulting Physician (Hematology and Oncology)    Date of Service: 2/15/2024     Diagnosis:   Specialty Problems          Radiation Oncology Problems    Malignant neoplasm of breast (CMS/HCC)        Malignant neoplasm of endometrium (CMS/HCC)         Treatment Summary:  Radiation Treatments       Active   Pelvis (Started on 2024)   Most recent fraction: 180 cGy given on 2024   Total given: 360 cGy / 4,500 cGy  (2 of 25 fractions)   Elapsed Days: 1   Technique: VMAT           Completed   No historical radiation treatments to show.             SUBJECTIVE:  Patient is doing well. No diarrhea or dysuria since starting RT. She did have some nausea after her first fraction of RT but not after her second fraction. 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 Eating without difficulty.    OBJECTIVE:   Vital Signs:  /79   Pulse 70   Temp 36.6 °C (97.9 °F) (Temporal)   Resp 18   Ht 1.549 m (5' 1\")   Wt 63.5 kg (140 lb)   SpO2 97%   BMI 26.45 kg/m²    Pain Scale: The patient's current pain level was assessed.  They report currently having a pain of 0 out of 10.    Other Pertinent Findings:     Toxicity Assessment          2/15/2024    09:48   Toxicity Assessment   Adverse Events Reviewed (WDL) No (Exceptions to WDL)   Treatment Site Pelvis - female   Anorexia Grade 0   Anxiety Grade 0   Dehydration Grade 0   Depression Grade 0   Dermatitis Radiation Grade 0   Diarrhea Grade 0   Fatigue Grade 0   Nausea Grade 0   Pain Grade 0   Vomiting Grade 0   Lower Gastrointestinal Hemorrhage Grade 0   Urinary Incontinence Grade 0   Pelvic Pain Grade 0   Vaginal Hemorrhage " Grade 0        Assessment / Plan:  The patient is tolerating radiation therapy as anticipated.  Continue per current treatment plan. I will contact Dr. Shabazz to discuss prescribing an anti-emetic to use as needed.

## 2024-02-16 ENCOUNTER — HOSPITAL ENCOUNTER (OUTPATIENT)
Dept: RADIATION ONCOLOGY | Facility: HOSPITAL | Age: 69
Setting detail: RADIATION/ONCOLOGY SERIES
Discharge: HOME | End: 2024-02-16
Payer: MEDICARE

## 2024-02-16 DIAGNOSIS — Z51.0 ENCOUNTER FOR ANTINEOPLASTIC RADIATION THERAPY: ICD-10-CM

## 2024-02-16 DIAGNOSIS — C54.0 MALIGNANT NEOPLASM OF ISTHMUS UTERI (MULTI): ICD-10-CM

## 2024-02-16 LAB
RAD ONC MSQ ACTUAL FRACTIONS DELIVERED: 4
RAD ONC MSQ ACTUAL SESSION DELIVERED DOSE: 180 CGRAY
RAD ONC MSQ ACTUAL TOTAL DOSE: 720 CGRAY
RAD ONC MSQ ELAPSED DAYS: 3
RAD ONC MSQ LAST DATE: NORMAL
RAD ONC MSQ PRESCRIBED FRACTIONAL DOSE: 180 CGRAY
RAD ONC MSQ PRESCRIBED NUMBER OF FRACTIONS: 25
RAD ONC MSQ PRESCRIBED TECHNIQUE: NORMAL
RAD ONC MSQ PRESCRIBED TOTAL DOSE: 4500 CGRAY
RAD ONC MSQ PRESCRIPTION PATTERN COMMENT: NORMAL
RAD ONC MSQ START DATE: NORMAL
RAD ONC MSQ TREATMENT COURSE NUMBER: 1
RAD ONC MSQ TREATMENT SITE: NORMAL

## 2024-02-16 PROCEDURE — 77014 CHG CT GUIDANCE RADIATION THERAPY FLDS PLACEMENT: CPT | Performed by: STUDENT IN AN ORGANIZED HEALTH CARE EDUCATION/TRAINING PROGRAM

## 2024-02-16 PROCEDURE — 77386 HC INTENSITY-MODULATED RADIATION THERAPY (IMRT), COMPLEX: CPT | Performed by: STUDENT IN AN ORGANIZED HEALTH CARE EDUCATION/TRAINING PROGRAM

## 2024-02-19 ENCOUNTER — HOSPITAL ENCOUNTER (OUTPATIENT)
Dept: RADIATION ONCOLOGY | Facility: HOSPITAL | Age: 69
Setting detail: RADIATION/ONCOLOGY SERIES
Discharge: HOME | End: 2024-02-19
Payer: MEDICARE

## 2024-02-19 DIAGNOSIS — Z51.0 ENCOUNTER FOR ANTINEOPLASTIC RADIATION THERAPY: ICD-10-CM

## 2024-02-19 DIAGNOSIS — C54.0 MALIGNANT NEOPLASM OF ISTHMUS UTERI (MULTI): ICD-10-CM

## 2024-02-19 LAB
RAD ONC MSQ ACTUAL FRACTIONS DELIVERED: 5
RAD ONC MSQ ACTUAL SESSION DELIVERED DOSE: 180 CGRAY
RAD ONC MSQ ACTUAL TOTAL DOSE: 900 CGRAY
RAD ONC MSQ ELAPSED DAYS: 6
RAD ONC MSQ LAST DATE: NORMAL
RAD ONC MSQ PRESCRIBED FRACTIONAL DOSE: 180 CGRAY
RAD ONC MSQ PRESCRIBED NUMBER OF FRACTIONS: 25
RAD ONC MSQ PRESCRIBED TECHNIQUE: NORMAL
RAD ONC MSQ PRESCRIBED TOTAL DOSE: 4500 CGRAY
RAD ONC MSQ PRESCRIPTION PATTERN COMMENT: NORMAL
RAD ONC MSQ START DATE: NORMAL
RAD ONC MSQ TREATMENT COURSE NUMBER: 1
RAD ONC MSQ TREATMENT SITE: NORMAL

## 2024-02-19 PROCEDURE — 77014 CHG CT GUIDANCE RADIATION THERAPY FLDS PLACEMENT: CPT | Performed by: STUDENT IN AN ORGANIZED HEALTH CARE EDUCATION/TRAINING PROGRAM

## 2024-02-19 PROCEDURE — 77386 HC INTENSITY-MODULATED RADIATION THERAPY (IMRT), COMPLEX: CPT | Performed by: STUDENT IN AN ORGANIZED HEALTH CARE EDUCATION/TRAINING PROGRAM

## 2024-02-20 ENCOUNTER — HOSPITAL ENCOUNTER (OUTPATIENT)
Dept: RADIATION ONCOLOGY | Facility: HOSPITAL | Age: 69
Setting detail: RADIATION/ONCOLOGY SERIES
Discharge: HOME | End: 2024-02-20
Payer: MEDICARE

## 2024-02-20 ENCOUNTER — RADIATION ONCOLOGY OTV (OUTPATIENT)
Dept: RADIATION ONCOLOGY | Facility: HOSPITAL | Age: 69
End: 2024-02-20

## 2024-02-20 VITALS
RESPIRATION RATE: 18 BRPM | SYSTOLIC BLOOD PRESSURE: 128 MMHG | BODY MASS INDEX: 26.68 KG/M2 | HEART RATE: 101 BPM | WEIGHT: 141.2 LBS | DIASTOLIC BLOOD PRESSURE: 84 MMHG | TEMPERATURE: 97 F | OXYGEN SATURATION: 96 %

## 2024-02-20 DIAGNOSIS — Z51.0 ENCOUNTER FOR ANTINEOPLASTIC RADIATION THERAPY: ICD-10-CM

## 2024-02-20 DIAGNOSIS — C54.0 MALIGNANT NEOPLASM OF ISTHMUS UTERI (MULTI): ICD-10-CM

## 2024-02-20 LAB
RAD ONC MSQ ACTUAL FRACTIONS DELIVERED: 6
RAD ONC MSQ ACTUAL SESSION DELIVERED DOSE: 180 CGRAY
RAD ONC MSQ ACTUAL TOTAL DOSE: 1080 CGRAY
RAD ONC MSQ ELAPSED DAYS: 7
RAD ONC MSQ LAST DATE: NORMAL
RAD ONC MSQ PRESCRIBED FRACTIONAL DOSE: 180 CGRAY
RAD ONC MSQ PRESCRIBED NUMBER OF FRACTIONS: 25
RAD ONC MSQ PRESCRIBED TECHNIQUE: NORMAL
RAD ONC MSQ PRESCRIBED TOTAL DOSE: 4500 CGRAY
RAD ONC MSQ PRESCRIPTION PATTERN COMMENT: NORMAL
RAD ONC MSQ START DATE: NORMAL
RAD ONC MSQ TREATMENT COURSE NUMBER: 1
RAD ONC MSQ TREATMENT SITE: NORMAL

## 2024-02-20 PROCEDURE — 77014 CHG CT GUIDANCE RADIATION THERAPY FLDS PLACEMENT: CPT | Performed by: STUDENT IN AN ORGANIZED HEALTH CARE EDUCATION/TRAINING PROGRAM

## 2024-02-20 PROCEDURE — 77386 HC INTENSITY-MODULATED RADIATION THERAPY (IMRT), COMPLEX: CPT | Performed by: STUDENT IN AN ORGANIZED HEALTH CARE EDUCATION/TRAINING PROGRAM

## 2024-02-20 PROCEDURE — 77427 RADIATION TX MANAGEMENT X5: CPT | Performed by: STUDENT IN AN ORGANIZED HEALTH CARE EDUCATION/TRAINING PROGRAM

## 2024-02-21 ENCOUNTER — HOSPITAL ENCOUNTER (OUTPATIENT)
Dept: RADIATION ONCOLOGY | Facility: HOSPITAL | Age: 69
Setting detail: RADIATION/ONCOLOGY SERIES
Discharge: HOME | End: 2024-02-21
Payer: MEDICARE

## 2024-02-21 DIAGNOSIS — Z51.0 ENCOUNTER FOR ANTINEOPLASTIC RADIATION THERAPY: ICD-10-CM

## 2024-02-21 DIAGNOSIS — C54.0 MALIGNANT NEOPLASM OF ISTHMUS UTERI (MULTI): ICD-10-CM

## 2024-02-21 LAB
RAD ONC MSQ ACTUAL FRACTIONS DELIVERED: 7
RAD ONC MSQ ACTUAL SESSION DELIVERED DOSE: 180 CGRAY
RAD ONC MSQ ACTUAL TOTAL DOSE: 1260 CGRAY
RAD ONC MSQ ELAPSED DAYS: 8
RAD ONC MSQ LAST DATE: NORMAL
RAD ONC MSQ PRESCRIBED FRACTIONAL DOSE: 180 CGRAY
RAD ONC MSQ PRESCRIBED NUMBER OF FRACTIONS: 25
RAD ONC MSQ PRESCRIBED TECHNIQUE: NORMAL
RAD ONC MSQ PRESCRIBED TOTAL DOSE: 4500 CGRAY
RAD ONC MSQ PRESCRIPTION PATTERN COMMENT: NORMAL
RAD ONC MSQ START DATE: NORMAL
RAD ONC MSQ TREATMENT COURSE NUMBER: 1
RAD ONC MSQ TREATMENT SITE: NORMAL

## 2024-02-21 PROCEDURE — 77386 HC INTENSITY-MODULATED RADIATION THERAPY (IMRT), COMPLEX: CPT | Performed by: STUDENT IN AN ORGANIZED HEALTH CARE EDUCATION/TRAINING PROGRAM

## 2024-02-21 PROCEDURE — 77336 RADIATION PHYSICS CONSULT: CPT | Performed by: STUDENT IN AN ORGANIZED HEALTH CARE EDUCATION/TRAINING PROGRAM

## 2024-02-21 PROCEDURE — 77014 CHG CT GUIDANCE RADIATION THERAPY FLDS PLACEMENT: CPT | Performed by: STUDENT IN AN ORGANIZED HEALTH CARE EDUCATION/TRAINING PROGRAM

## 2024-02-22 ENCOUNTER — HOSPITAL ENCOUNTER (OUTPATIENT)
Dept: RADIATION ONCOLOGY | Facility: HOSPITAL | Age: 69
Setting detail: RADIATION/ONCOLOGY SERIES
Discharge: HOME | End: 2024-02-22
Payer: MEDICARE

## 2024-02-22 DIAGNOSIS — C54.0 MALIGNANT NEOPLASM OF ISTHMUS UTERI (MULTI): ICD-10-CM

## 2024-02-22 DIAGNOSIS — Z51.0 ENCOUNTER FOR ANTINEOPLASTIC RADIATION THERAPY: ICD-10-CM

## 2024-02-22 LAB
RAD ONC MSQ ACTUAL FRACTIONS DELIVERED: 8
RAD ONC MSQ ACTUAL SESSION DELIVERED DOSE: 180 CGRAY
RAD ONC MSQ ACTUAL TOTAL DOSE: 1440 CGRAY
RAD ONC MSQ ELAPSED DAYS: 9
RAD ONC MSQ LAST DATE: NORMAL
RAD ONC MSQ PRESCRIBED FRACTIONAL DOSE: 180 CGRAY
RAD ONC MSQ PRESCRIBED NUMBER OF FRACTIONS: 25
RAD ONC MSQ PRESCRIBED TECHNIQUE: NORMAL
RAD ONC MSQ PRESCRIBED TOTAL DOSE: 4500 CGRAY
RAD ONC MSQ PRESCRIPTION PATTERN COMMENT: NORMAL
RAD ONC MSQ START DATE: NORMAL
RAD ONC MSQ TREATMENT COURSE NUMBER: 1
RAD ONC MSQ TREATMENT SITE: NORMAL

## 2024-02-22 PROCEDURE — 77386 HC INTENSITY-MODULATED RADIATION THERAPY (IMRT), COMPLEX: CPT | Performed by: STUDENT IN AN ORGANIZED HEALTH CARE EDUCATION/TRAINING PROGRAM

## 2024-02-22 PROCEDURE — 77014 CHG CT GUIDANCE RADIATION THERAPY FLDS PLACEMENT: CPT | Performed by: STUDENT IN AN ORGANIZED HEALTH CARE EDUCATION/TRAINING PROGRAM

## 2024-02-22 NOTE — PROGRESS NOTES
Radiation Oncology On Treatment Visit    Patient Name:  Mica Vanessa  MRN:  [id number]  :  1955    Referring Provider: No ref. provider found  Primary Care Provider: Nurys Hector MD  Care Team: Patient Care Team:  Nurys Hector MD as PCP - General  Nannette Palacio MD as Obstetrician (Obstetrics and Gynecology)  Kris Plata MD as Consulting Physician (Hematology and Oncology)    Date of Service: 2024     Diagnosis:   Specialty Problems          Radiation Oncology Problems    Malignant neoplasm of breast (CMS/HCC)        Malignant neoplasm of endometrium (CMS/HCC)         Treatment Summary:  Radiation Treatments       Active   Pelvis (Started on 2024)   Most recent fraction: 180 cGy given on 2024   Total given: 1,260 cGy / 4,500 cGy  (7 of 25 fractions)   Elapsed Days: 8   Technique: VMAT           Completed   No historical radiation treatments to show.             SUBJECTIVE: Pt doing well. No bowel/bladder complaints      OBJECTIVE:   Vital Signs:  /84   Pulse 101   Temp 36.1 °C (97 °F) (Temporal)   Resp 18   Wt 64 kg (141 lb 3.2 oz)   SpO2 96%   BMI 26.68 kg/m²    Pain Scale: The patient's current pain level was assessed.  They report currently having a pain of 0 out of 10.    Other Pertinent Findings:     Toxicity Assessment          2/15/2024    09:48 2024    16:05   Toxicity Assessment   Adverse Events Reviewed (WDL) No (Exceptions to WDL) No (Exceptions to WDL)   Treatment Site Pelvis - female Pelvis - female   Anorexia Grade 0 Grade 0   Anxiety Grade 0    Dehydration Grade 0 Grade 0   Depression Grade 0    Dermatitis Radiation Grade 0 Grade 0   Diarrhea Grade 0 Grade 1       occasional loose stool   Fatigue Grade 0 Grade 0   Nausea Grade 0 Grade 0       had 1 episode last week that resolved without intervention   Pain Grade 0 Grade 0       chronic back pain is unchanged, takes Ibuprofen as needed   Vomiting Grade 0 Grade 0   Lower Gastrointestinal Hemorrhage  Grade 0    Urinary Incontinence Grade 0 Grade 0       No urinary complaints.   Pelvic Pain Grade 0    Vaginal Hemorrhage Grade 0 Grade 0        Assessment / Plan:  The patient is tolerating radiation therapy as anticipated.  Continue per current treatment plan.

## 2024-02-23 ENCOUNTER — HOSPITAL ENCOUNTER (OUTPATIENT)
Dept: RADIATION ONCOLOGY | Facility: HOSPITAL | Age: 69
Setting detail: RADIATION/ONCOLOGY SERIES
Discharge: HOME | End: 2024-02-23
Payer: MEDICARE

## 2024-02-23 DIAGNOSIS — Z51.0 ENCOUNTER FOR ANTINEOPLASTIC RADIATION THERAPY: ICD-10-CM

## 2024-02-23 DIAGNOSIS — C54.0 MALIGNANT NEOPLASM OF ISTHMUS UTERI (MULTI): ICD-10-CM

## 2024-02-23 LAB
RAD ONC MSQ ACTUAL FRACTIONS DELIVERED: 9
RAD ONC MSQ ACTUAL SESSION DELIVERED DOSE: 180 CGRAY
RAD ONC MSQ ACTUAL TOTAL DOSE: 1620 CGRAY
RAD ONC MSQ ELAPSED DAYS: 10
RAD ONC MSQ LAST DATE: NORMAL
RAD ONC MSQ PRESCRIBED FRACTIONAL DOSE: 180 CGRAY
RAD ONC MSQ PRESCRIBED NUMBER OF FRACTIONS: 25
RAD ONC MSQ PRESCRIBED TECHNIQUE: NORMAL
RAD ONC MSQ PRESCRIBED TOTAL DOSE: 4500 CGRAY
RAD ONC MSQ PRESCRIPTION PATTERN COMMENT: NORMAL
RAD ONC MSQ START DATE: NORMAL
RAD ONC MSQ TREATMENT COURSE NUMBER: 1
RAD ONC MSQ TREATMENT SITE: NORMAL

## 2024-02-23 PROCEDURE — 77014 CHG CT GUIDANCE RADIATION THERAPY FLDS PLACEMENT: CPT | Performed by: STUDENT IN AN ORGANIZED HEALTH CARE EDUCATION/TRAINING PROGRAM

## 2024-02-23 PROCEDURE — 77386 HC INTENSITY-MODULATED RADIATION THERAPY (IMRT), COMPLEX: CPT | Performed by: STUDENT IN AN ORGANIZED HEALTH CARE EDUCATION/TRAINING PROGRAM

## 2024-02-26 ENCOUNTER — HOSPITAL ENCOUNTER (OUTPATIENT)
Dept: RADIATION ONCOLOGY | Facility: HOSPITAL | Age: 69
Setting detail: RADIATION/ONCOLOGY SERIES
Discharge: HOME | End: 2024-02-26
Payer: MEDICARE

## 2024-02-26 ENCOUNTER — OFFICE VISIT (OUTPATIENT)
Dept: GYNECOLOGIC ONCOLOGY | Facility: CLINIC | Age: 69
End: 2024-02-26
Payer: MEDICARE

## 2024-02-26 DIAGNOSIS — Z51.0 ENCOUNTER FOR ANTINEOPLASTIC RADIATION THERAPY: ICD-10-CM

## 2024-02-26 DIAGNOSIS — C54.1 MALIGNANT NEOPLASM OF ENDOMETRIUM (MULTI): Primary | ICD-10-CM

## 2024-02-26 DIAGNOSIS — C54.0 MALIGNANT NEOPLASM OF ISTHMUS UTERI (MULTI): ICD-10-CM

## 2024-02-26 LAB
RAD ONC MSQ ACTUAL FRACTIONS DELIVERED: 10
RAD ONC MSQ ACTUAL SESSION DELIVERED DOSE: 180 CGRAY
RAD ONC MSQ ACTUAL TOTAL DOSE: 1800 CGRAY
RAD ONC MSQ ELAPSED DAYS: 13
RAD ONC MSQ LAST DATE: NORMAL
RAD ONC MSQ PRESCRIBED FRACTIONAL DOSE: 180 CGRAY
RAD ONC MSQ PRESCRIBED NUMBER OF FRACTIONS: 25
RAD ONC MSQ PRESCRIBED TECHNIQUE: NORMAL
RAD ONC MSQ PRESCRIBED TOTAL DOSE: 4500 CGRAY
RAD ONC MSQ PRESCRIPTION PATTERN COMMENT: NORMAL
RAD ONC MSQ START DATE: NORMAL
RAD ONC MSQ TREATMENT COURSE NUMBER: 1
RAD ONC MSQ TREATMENT SITE: NORMAL

## 2024-02-26 PROCEDURE — 1160F RVW MEDS BY RX/DR IN RCRD: CPT | Performed by: OBSTETRICS & GYNECOLOGY

## 2024-02-26 PROCEDURE — 77386 HC INTENSITY-MODULATED RADIATION THERAPY (IMRT), COMPLEX: CPT | Performed by: STUDENT IN AN ORGANIZED HEALTH CARE EDUCATION/TRAINING PROGRAM

## 2024-02-26 PROCEDURE — 1126F AMNT PAIN NOTED NONE PRSNT: CPT | Performed by: OBSTETRICS & GYNECOLOGY

## 2024-02-26 PROCEDURE — 99024 POSTOP FOLLOW-UP VISIT: CPT | Performed by: OBSTETRICS & GYNECOLOGY

## 2024-02-26 PROCEDURE — 1036F TOBACCO NON-USER: CPT | Performed by: OBSTETRICS & GYNECOLOGY

## 2024-02-26 PROCEDURE — 77014 CHG CT GUIDANCE RADIATION THERAPY FLDS PLACEMENT: CPT | Performed by: STUDENT IN AN ORGANIZED HEALTH CARE EDUCATION/TRAINING PROGRAM

## 2024-02-26 PROCEDURE — 1159F MED LIST DOCD IN RCRD: CPT | Performed by: OBSTETRICS & GYNECOLOGY

## 2024-02-26 NOTE — PROGRESS NOTES
Patient ID: Mica Vanessa is a 68 y.o. female.  Referring Physician: No referring provider defined for this encounter.  Primary Care Provider: Nurys Hector MD    Subjective    Referred by Dr. Parkinson    67 yo with uterine cancer    Cancer history:  10/6/23 EMB w/ uterine cancer, mesonephric type  10/21/23 Total laparoscopic hysterectomy bilateral salpingo oophorectomy, Bilateral sentinel lymph node injection and mapping, Left pelvic sentinel lymph node dissection and right complete pelvic lymphadenectomy, Cystourethroscopy.  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 MMRp, P53 alison type. TB recommendations for radiation oncology consult, genetics referral, consider systemic therapy      PMH  Breast cancer -required surgery chemotherapy and radiation  Hypertension  Arthritis    PSH  C/s X 3  Removal of vulvar SCC  Lumpectomy  OB/Gyn hx  G3, P3  x3.  No prior history of abnormal Pap.  Menopausal since her 50s    FH  Sister and daughter both had breast cancer.  They both had genetic testing which was negative.  The patient also had breast cancer but has not had genetic testing.  Feels cancer could be related to exposure to chemicals in her youth.    SH  Former smoker denies alcohol or illicit drug use.  Lives alone.  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 She was concerned regarding slight separation/bleeding/soreness at incision site and she resolved this with Neosporin and a bandaid, she asked about expected hair loss and states radiation will be completed 3/18/24. States she follows up with oncologist Dr. Plata on 2024, the patient also has not seen genetic counseling because her 2 sisters completed this with no BRCA+ results but one sister had a PTEN+ and her grandmother's sister  may have passed from stomach cancer. Expressed her [place] and the workers would spray in hazmat suits while the toxins consistently trickled into her house and she theorizes this likely caused her cancer.    Objective    BSA: There is no height or weight on file to calculate BSA.  There were no vitals taken for this visit.     Physical Exam  Constitutional:       General: She is not in acute distress.     Appearance: Normal appearance.   Cardiovascular:      Rate and Rhythm: Normal rate and regular rhythm.   Pulmonary:      Effort: Pulmonary effort is normal.      Breath sounds: Normal breath sounds.   Abdominal:      General: Bowel sounds are normal. There is no distension.   Genitourinary:     Comments: Vaginal cuff is well healed  Musculoskeletal:      Right forearm: Normal.      Left forearm: Normal.      Right hand: Normal.      Left hand: Normal.      Right lower leg: Normal.      Left lower leg: Normal.      Right foot: Normal.      Left foot: Normal.         Performance Status:  Asymptomatic    Assessment/Plan     Oncology History    No history exists.        Problem List Items Addressed This Visit             ICD-10-CM    Malignant neoplasm of endometrium (CMS/HCC) - Primary C54.1       Treatment Plans       No treatment plans exist        - Performed pelvic examination in office today without complications. Well healed after surgery  - Follow up in 5 weeks with Dr. Nannette Palacio.  Will consider imaging after completion of pelvic RT and discuss risk/benefit of systemic therapy in setting of higher risk histologic subtype vs. Close surveillance      Scribe Attestation  By signing my name below, Dyana ARBOLEDA, Kandiible   attest that this documentation has been prepared under the direction and in the presence of Nannette Palacio MD.

## 2024-02-27 ENCOUNTER — RADIATION ONCOLOGY OTV (OUTPATIENT)
Dept: RADIATION ONCOLOGY | Facility: HOSPITAL | Age: 69
End: 2024-02-27

## 2024-02-27 ENCOUNTER — HOSPITAL ENCOUNTER (OUTPATIENT)
Dept: RADIATION ONCOLOGY | Facility: HOSPITAL | Age: 69
Setting detail: RADIATION/ONCOLOGY SERIES
Discharge: HOME | End: 2024-02-27
Payer: MEDICARE

## 2024-02-27 VITALS
OXYGEN SATURATION: 94 % | SYSTOLIC BLOOD PRESSURE: 121 MMHG | WEIGHT: 143.7 LBS | TEMPERATURE: 97.9 F | HEART RATE: 117 BPM | BODY MASS INDEX: 27.13 KG/M2 | RESPIRATION RATE: 18 BRPM | DIASTOLIC BLOOD PRESSURE: 72 MMHG | HEIGHT: 61 IN

## 2024-02-27 DIAGNOSIS — Z51.0 ENCOUNTER FOR ANTINEOPLASTIC RADIATION THERAPY: ICD-10-CM

## 2024-02-27 DIAGNOSIS — C54.0 MALIGNANT NEOPLASM OF ISTHMUS UTERI (MULTI): ICD-10-CM

## 2024-02-27 LAB
RAD ONC MSQ ACTUAL FRACTIONS DELIVERED: 11
RAD ONC MSQ ACTUAL SESSION DELIVERED DOSE: 180 CGRAY
RAD ONC MSQ ACTUAL TOTAL DOSE: 1980 CGRAY
RAD ONC MSQ ELAPSED DAYS: 14
RAD ONC MSQ LAST DATE: NORMAL
RAD ONC MSQ PRESCRIBED FRACTIONAL DOSE: 180 CGRAY
RAD ONC MSQ PRESCRIBED NUMBER OF FRACTIONS: 25
RAD ONC MSQ PRESCRIBED TECHNIQUE: NORMAL
RAD ONC MSQ PRESCRIBED TOTAL DOSE: 4500 CGRAY
RAD ONC MSQ PRESCRIPTION PATTERN COMMENT: NORMAL
RAD ONC MSQ START DATE: NORMAL
RAD ONC MSQ TREATMENT COURSE NUMBER: 1
RAD ONC MSQ TREATMENT SITE: NORMAL

## 2024-02-27 PROCEDURE — 77427 RADIATION TX MANAGEMENT X5: CPT | Performed by: STUDENT IN AN ORGANIZED HEALTH CARE EDUCATION/TRAINING PROGRAM

## 2024-02-27 PROCEDURE — 77386 HC INTENSITY-MODULATED RADIATION THERAPY (IMRT), COMPLEX: CPT | Performed by: STUDENT IN AN ORGANIZED HEALTH CARE EDUCATION/TRAINING PROGRAM

## 2024-02-27 PROCEDURE — 77014 CHG CT GUIDANCE RADIATION THERAPY FLDS PLACEMENT: CPT | Performed by: STUDENT IN AN ORGANIZED HEALTH CARE EDUCATION/TRAINING PROGRAM

## 2024-02-28 ENCOUNTER — HOSPITAL ENCOUNTER (OUTPATIENT)
Dept: RADIATION ONCOLOGY | Facility: HOSPITAL | Age: 69
Setting detail: RADIATION/ONCOLOGY SERIES
Discharge: HOME | End: 2024-02-28
Payer: MEDICARE

## 2024-02-28 DIAGNOSIS — Z51.0 ENCOUNTER FOR ANTINEOPLASTIC RADIATION THERAPY: ICD-10-CM

## 2024-02-28 DIAGNOSIS — C54.0 MALIGNANT NEOPLASM OF ISTHMUS UTERI (MULTI): ICD-10-CM

## 2024-02-28 LAB
RAD ONC MSQ ACTUAL FRACTIONS DELIVERED: 12
RAD ONC MSQ ACTUAL SESSION DELIVERED DOSE: 180 CGRAY
RAD ONC MSQ ACTUAL TOTAL DOSE: 2160 CGRAY
RAD ONC MSQ ELAPSED DAYS: 15
RAD ONC MSQ LAST DATE: NORMAL
RAD ONC MSQ PRESCRIBED FRACTIONAL DOSE: 180 CGRAY
RAD ONC MSQ PRESCRIBED NUMBER OF FRACTIONS: 25
RAD ONC MSQ PRESCRIBED TECHNIQUE: NORMAL
RAD ONC MSQ PRESCRIBED TOTAL DOSE: 4500 CGRAY
RAD ONC MSQ PRESCRIPTION PATTERN COMMENT: NORMAL
RAD ONC MSQ START DATE: NORMAL
RAD ONC MSQ TREATMENT COURSE NUMBER: 1
RAD ONC MSQ TREATMENT SITE: NORMAL

## 2024-02-28 PROCEDURE — 77386 HC INTENSITY-MODULATED RADIATION THERAPY (IMRT), COMPLEX: CPT | Performed by: STUDENT IN AN ORGANIZED HEALTH CARE EDUCATION/TRAINING PROGRAM

## 2024-02-28 PROCEDURE — 77336 RADIATION PHYSICS CONSULT: CPT | Performed by: STUDENT IN AN ORGANIZED HEALTH CARE EDUCATION/TRAINING PROGRAM

## 2024-02-28 PROCEDURE — 77014 CHG CT GUIDANCE RADIATION THERAPY FLDS PLACEMENT: CPT | Performed by: STUDENT IN AN ORGANIZED HEALTH CARE EDUCATION/TRAINING PROGRAM

## 2024-02-29 ENCOUNTER — HOSPITAL ENCOUNTER (OUTPATIENT)
Dept: RADIATION ONCOLOGY | Facility: HOSPITAL | Age: 69
Setting detail: RADIATION/ONCOLOGY SERIES
Discharge: HOME | End: 2024-02-29
Payer: MEDICARE

## 2024-02-29 DIAGNOSIS — Z51.0 ENCOUNTER FOR ANTINEOPLASTIC RADIATION THERAPY: ICD-10-CM

## 2024-02-29 DIAGNOSIS — C54.0 MALIGNANT NEOPLASM OF ISTHMUS UTERI (MULTI): ICD-10-CM

## 2024-02-29 LAB
RAD ONC MSQ ACTUAL FRACTIONS DELIVERED: 13
RAD ONC MSQ ACTUAL SESSION DELIVERED DOSE: 180 CGRAY
RAD ONC MSQ ACTUAL TOTAL DOSE: 2340 CGRAY
RAD ONC MSQ ELAPSED DAYS: 16
RAD ONC MSQ LAST DATE: NORMAL
RAD ONC MSQ PRESCRIBED FRACTIONAL DOSE: 180 CGRAY
RAD ONC MSQ PRESCRIBED NUMBER OF FRACTIONS: 25
RAD ONC MSQ PRESCRIBED TECHNIQUE: NORMAL
RAD ONC MSQ PRESCRIBED TOTAL DOSE: 4500 CGRAY
RAD ONC MSQ PRESCRIPTION PATTERN COMMENT: NORMAL
RAD ONC MSQ START DATE: NORMAL
RAD ONC MSQ TREATMENT COURSE NUMBER: 1
RAD ONC MSQ TREATMENT SITE: NORMAL

## 2024-02-29 PROCEDURE — 77386 HC INTENSITY-MODULATED RADIATION THERAPY (IMRT), COMPLEX: CPT | Performed by: STUDENT IN AN ORGANIZED HEALTH CARE EDUCATION/TRAINING PROGRAM

## 2024-02-29 PROCEDURE — 77014 CHG CT GUIDANCE RADIATION THERAPY FLDS PLACEMENT: CPT | Performed by: STUDENT IN AN ORGANIZED HEALTH CARE EDUCATION/TRAINING PROGRAM

## 2024-02-29 NOTE — PROGRESS NOTES
Radiation Oncology On Treatment Visit    Patient Name:  Mica Vanessa  MRN:  [id number]  :  1955    Referring Provider: No ref. provider found  Primary Care Provider: Nurys Hector MD  Care Team: Patient Care Team:  Nurys Hector MD as PCP - General  Nannette Palacio MD as Obstetrician (Obstetrics and Gynecology)  Kris Plata MD as Consulting Physician (Hematology and Oncology)    Date of Service: 2024     Diagnosis:   Specialty Problems          Radiation Oncology Problems    Malignant neoplasm of breast (CMS/HCC)        Malignant neoplasm of endometrium (CMS/HCC)         Treatment Summary:  Radiation Treatments       Active   Pelvis (Started on 2024)   Most recent fraction: 180 cGy given on 2024   Total given: 2,160 cGy / 4,500 cGy  (12 of 25 fractions)   Elapsed Days: 15   Technique: VMAT           Completed   No historical radiation treatments to show.             SUBJECTIVE: Pt continues to do well.      OBJECTIVE:   Vital Signs:  There were no vitals taken for this visit.  Pain Scale: The patient's current pain level was assessed.  They report currently having a pain of 1 out of 10.    Other Pertinent Findings:     Toxicity Assessment          2/15/2024    09:48 2024    16:05 2024    16:35   Toxicity Assessment   Adverse Events Reviewed (WDL) No (Exceptions to WDL) No (Exceptions to WDL) No (Exceptions to WDL)   Treatment Site Pelvis - female Pelvis - female Pelvis - female   Anorexia Grade 0 Grade 0 Grade 0   Anxiety Grade 0  Grade 0   Dehydration Grade 0 Grade 0 Grade 0   Depression Grade 0     Dermatitis Radiation Grade 0 Grade 0 Grade 0   Diarrhea Grade 0 Grade 1       occasional loose stool Grade 0   Fatigue Grade 0 Grade 0 Grade 0   Nausea Grade 0 Grade 0       had 1 episode last week that resolved without intervention Grade 0   Pain Grade 0 Grade 0       chronic back pain is unchanged, takes Ibuprofen as needed Grade 0   Vomiting Grade 0 Grade 0 Grade 0   Lower  Gastrointestinal Hemorrhage Grade 0     Urinary Incontinence Grade 0 Grade 0       No urinary complaints. Grade 0   Pelvic Pain Grade 0     Vaginal Hemorrhage Grade 0 Grade 0 Grade 0        Assessment / Plan:  The patient is tolerating radiation therapy as anticipated.  Continue per current treatment plan.

## 2024-03-01 ENCOUNTER — HOSPITAL ENCOUNTER (OUTPATIENT)
Dept: RADIATION ONCOLOGY | Facility: HOSPITAL | Age: 69
Setting detail: RADIATION/ONCOLOGY SERIES
Discharge: HOME | End: 2024-03-01
Payer: MEDICARE

## 2024-03-01 DIAGNOSIS — C54.0 MALIGNANT NEOPLASM OF ISTHMUS UTERI (MULTI): ICD-10-CM

## 2024-03-01 DIAGNOSIS — Z51.0 ENCOUNTER FOR ANTINEOPLASTIC RADIATION THERAPY: ICD-10-CM

## 2024-03-01 LAB
RAD ONC MSQ ACTUAL FRACTIONS DELIVERED: 14
RAD ONC MSQ ACTUAL SESSION DELIVERED DOSE: 180 CGRAY
RAD ONC MSQ ACTUAL TOTAL DOSE: 2520 CGRAY
RAD ONC MSQ ELAPSED DAYS: 17
RAD ONC MSQ LAST DATE: NORMAL
RAD ONC MSQ PRESCRIBED FRACTIONAL DOSE: 180 CGRAY
RAD ONC MSQ PRESCRIBED NUMBER OF FRACTIONS: 25
RAD ONC MSQ PRESCRIBED TECHNIQUE: NORMAL
RAD ONC MSQ PRESCRIBED TOTAL DOSE: 4500 CGRAY
RAD ONC MSQ PRESCRIPTION PATTERN COMMENT: NORMAL
RAD ONC MSQ START DATE: NORMAL
RAD ONC MSQ TREATMENT COURSE NUMBER: 1
RAD ONC MSQ TREATMENT SITE: NORMAL

## 2024-03-01 PROCEDURE — 77386 HC INTENSITY-MODULATED RADIATION THERAPY (IMRT), COMPLEX: CPT | Performed by: RADIOLOGY

## 2024-03-01 PROCEDURE — 77014 CHG CT GUIDANCE RADIATION THERAPY FLDS PLACEMENT: CPT | Performed by: RADIOLOGY

## 2024-03-04 ENCOUNTER — HOSPITAL ENCOUNTER (OUTPATIENT)
Dept: RADIATION ONCOLOGY | Facility: HOSPITAL | Age: 69
Setting detail: RADIATION/ONCOLOGY SERIES
Discharge: HOME | End: 2024-03-04
Payer: MEDICARE

## 2024-03-04 DIAGNOSIS — Z51.0 ENCOUNTER FOR ANTINEOPLASTIC RADIATION THERAPY: ICD-10-CM

## 2024-03-04 DIAGNOSIS — C54.0 MALIGNANT NEOPLASM OF ISTHMUS UTERI (MULTI): ICD-10-CM

## 2024-03-04 LAB
RAD ONC MSQ ACTUAL FRACTIONS DELIVERED: 15
RAD ONC MSQ ACTUAL SESSION DELIVERED DOSE: 180 CGRAY
RAD ONC MSQ ACTUAL TOTAL DOSE: 2700 CGRAY
RAD ONC MSQ ELAPSED DAYS: 20
RAD ONC MSQ LAST DATE: NORMAL
RAD ONC MSQ PRESCRIBED FRACTIONAL DOSE: 180 CGRAY
RAD ONC MSQ PRESCRIBED NUMBER OF FRACTIONS: 25
RAD ONC MSQ PRESCRIBED TECHNIQUE: NORMAL
RAD ONC MSQ PRESCRIBED TOTAL DOSE: 4500 CGRAY
RAD ONC MSQ PRESCRIPTION PATTERN COMMENT: NORMAL
RAD ONC MSQ START DATE: NORMAL
RAD ONC MSQ TREATMENT COURSE NUMBER: 1
RAD ONC MSQ TREATMENT SITE: NORMAL

## 2024-03-04 PROCEDURE — 77386 HC INTENSITY-MODULATED RADIATION THERAPY (IMRT), COMPLEX: CPT | Performed by: STUDENT IN AN ORGANIZED HEALTH CARE EDUCATION/TRAINING PROGRAM

## 2024-03-04 PROCEDURE — 77014 CHG CT GUIDANCE RADIATION THERAPY FLDS PLACEMENT: CPT | Performed by: STUDENT IN AN ORGANIZED HEALTH CARE EDUCATION/TRAINING PROGRAM

## 2024-03-05 ENCOUNTER — RADIATION ONCOLOGY OTV (OUTPATIENT)
Dept: RADIATION ONCOLOGY | Facility: HOSPITAL | Age: 69
End: 2024-03-05
Payer: MEDICARE

## 2024-03-05 ENCOUNTER — HOSPITAL ENCOUNTER (OUTPATIENT)
Dept: RADIATION ONCOLOGY | Facility: HOSPITAL | Age: 69
Setting detail: RADIATION/ONCOLOGY SERIES
Discharge: HOME | End: 2024-03-05
Payer: MEDICARE

## 2024-03-05 VITALS
OXYGEN SATURATION: 96 % | RESPIRATION RATE: 18 BRPM | TEMPERATURE: 97.5 F | SYSTOLIC BLOOD PRESSURE: 111 MMHG | DIASTOLIC BLOOD PRESSURE: 74 MMHG | BODY MASS INDEX: 26.11 KG/M2 | HEART RATE: 100 BPM | WEIGHT: 138.2 LBS

## 2024-03-05 DIAGNOSIS — C54.0 MALIGNANT NEOPLASM OF ISTHMUS UTERI (MULTI): ICD-10-CM

## 2024-03-05 DIAGNOSIS — Z51.0 ENCOUNTER FOR ANTINEOPLASTIC RADIATION THERAPY: ICD-10-CM

## 2024-03-05 LAB
RAD ONC MSQ ACTUAL FRACTIONS DELIVERED: 16
RAD ONC MSQ ACTUAL SESSION DELIVERED DOSE: 180 CGRAY
RAD ONC MSQ ACTUAL TOTAL DOSE: 2880 CGRAY
RAD ONC MSQ ELAPSED DAYS: 21
RAD ONC MSQ LAST DATE: NORMAL
RAD ONC MSQ PRESCRIBED FRACTIONAL DOSE: 180 CGRAY
RAD ONC MSQ PRESCRIBED NUMBER OF FRACTIONS: 25
RAD ONC MSQ PRESCRIBED TECHNIQUE: NORMAL
RAD ONC MSQ PRESCRIBED TOTAL DOSE: 4500 CGRAY
RAD ONC MSQ PRESCRIPTION PATTERN COMMENT: NORMAL
RAD ONC MSQ START DATE: NORMAL
RAD ONC MSQ TREATMENT COURSE NUMBER: 1
RAD ONC MSQ TREATMENT SITE: NORMAL

## 2024-03-05 PROCEDURE — 77386 HC INTENSITY-MODULATED RADIATION THERAPY (IMRT), COMPLEX: CPT | Performed by: STUDENT IN AN ORGANIZED HEALTH CARE EDUCATION/TRAINING PROGRAM

## 2024-03-05 PROCEDURE — 77427 RADIATION TX MANAGEMENT X5: CPT | Performed by: STUDENT IN AN ORGANIZED HEALTH CARE EDUCATION/TRAINING PROGRAM

## 2024-03-05 PROCEDURE — 77014 CHG CT GUIDANCE RADIATION THERAPY FLDS PLACEMENT: CPT | Performed by: STUDENT IN AN ORGANIZED HEALTH CARE EDUCATION/TRAINING PROGRAM

## 2024-03-05 ASSESSMENT — PAIN SCALES - GENERAL: PAINLEVEL: 0-NO PAIN

## 2024-03-05 NOTE — PROGRESS NOTES
"Radiation Oncology On Treatment Visit    Patient Name:  Mica Vanessa  MRN:  [id number]  :  1955    Referring Provider: No ref. provider found  Primary Care Provider: Nurys Hector MD  Care Team: Patient Care Team:  Nurys Hector MD as PCP - General  Nannette Palacio MD as Obstetrician (Obstetrics and Gynecology)  Kris Plata MD as Consulting Physician (Hematology and Oncology)    Date of Service: 3/5/2024     Diagnosis:   Specialty Problems          Radiation Oncology Problems    Malignant neoplasm of breast (CMS/HCC)        Malignant neoplasm of endometrium (CMS/HCC)         Treatment Summary:  Radiation Treatments       Active   Pelvis (Started on 2024)   Most recent fraction: 180 cGy given on 3/5/2024   Total given: 2,880 cGy / 4,500 cGy  (16 of 25 fractions)   Elapsed Days: 21   Technique: VMAT           Completed   No historical radiation treatments to show.             SUBJECTIVE: Pt doing well. Denies VB. Mild urinary \"discomfort\" although not dysuria. States this has been present since surgery and has not changed overall since starting her RT   Had one episode of \"runny bowel movements,\" denies diarrhea    OBJECTIVE:   Vital Signs:  /74   Pulse 100   Temp 36.4 °C (97.5 °F)   Resp 18   Wt 62.7 kg (138 lb 3.2 oz)   SpO2 96%   BMI 26.11 kg/m²    Pain Scale: The patient's current pain level was assessed.  They report currently having a pain of 1 out of 10.    Other Pertinent Findings:     Toxicity Assessment          2/15/2024    09:48 2024    16:05 2024    16:35 3/5/2024    15:48   Toxicity Assessment   Adverse Events Reviewed (WDL) No (Exceptions to WDL) No (Exceptions to WDL) No (Exceptions to WDL) No (Exceptions to WDL)   Treatment Site Pelvis - female Pelvis - female Pelvis - female Pelvis - female   Anorexia Grade 0 Grade 0 Grade 0 Grade 1       decreased appetite s/p upper respiratory virus   Anxiety Grade 0  Grade 0 Grade 0   Dehydration Grade 0 Grade 0 Grade 0 " "Grade 0   Depression Grade 0      Dermatitis Radiation Grade 0 Grade 0 Grade 0 Grade 0   Diarrhea Grade 0 Grade 1       occasional loose stool Grade 0 Grade 0       occasional loose BM   Fatigue Grade 0 Grade 0 Grade 0 Grade 1       increased fatigue, rest as needed, activity as tolerated   Nausea Grade 0 Grade 0       had 1 episode last week that resolved without intervention Grade 0    Pain Grade 0 Grade 0       chronic back pain is unchanged, takes Ibuprofen as needed Grade 0    Vomiting Grade 0 Grade 0 Grade 0    Lower Gastrointestinal Hemorrhage Grade 0      Urinary Incontinence Grade 0 Grade 0       No urinary complaints. Grade 0    Pelvic Pain Grade 0      Chronic Kidney Disease    --        pt reports occasional \"weird, pressure like feeling\" when urinating, denies any other urinary symptoms   Vaginal Hemorrhage Grade 0 Grade 0 Grade 0 Grade 0        Assessment / Plan:  The patient is tolerating radiation therapy as anticipated.  Continue per current treatment plan.     Advised patient to use imodium should she have 4+ diarrhea bowel movements per day     "

## 2024-03-06 ENCOUNTER — HOSPITAL ENCOUNTER (OUTPATIENT)
Dept: RADIATION ONCOLOGY | Facility: HOSPITAL | Age: 69
Setting detail: RADIATION/ONCOLOGY SERIES
Discharge: HOME | End: 2024-03-06
Payer: MEDICARE

## 2024-03-06 DIAGNOSIS — Z51.0 ENCOUNTER FOR ANTINEOPLASTIC RADIATION THERAPY: ICD-10-CM

## 2024-03-06 DIAGNOSIS — C54.0 MALIGNANT NEOPLASM OF ISTHMUS UTERI (MULTI): ICD-10-CM

## 2024-03-06 LAB
RAD ONC MSQ ACTUAL FRACTIONS DELIVERED: 17
RAD ONC MSQ ACTUAL SESSION DELIVERED DOSE: 180 CGRAY
RAD ONC MSQ ACTUAL TOTAL DOSE: 3060 CGRAY
RAD ONC MSQ ELAPSED DAYS: 22
RAD ONC MSQ LAST DATE: NORMAL
RAD ONC MSQ PRESCRIBED FRACTIONAL DOSE: 180 CGRAY
RAD ONC MSQ PRESCRIBED NUMBER OF FRACTIONS: 25
RAD ONC MSQ PRESCRIBED TECHNIQUE: NORMAL
RAD ONC MSQ PRESCRIBED TOTAL DOSE: 4500 CGRAY
RAD ONC MSQ PRESCRIPTION PATTERN COMMENT: NORMAL
RAD ONC MSQ START DATE: NORMAL
RAD ONC MSQ TREATMENT COURSE NUMBER: 1
RAD ONC MSQ TREATMENT SITE: NORMAL

## 2024-03-06 PROCEDURE — 77336 RADIATION PHYSICS CONSULT: CPT | Performed by: STUDENT IN AN ORGANIZED HEALTH CARE EDUCATION/TRAINING PROGRAM

## 2024-03-06 PROCEDURE — 77014 CHG CT GUIDANCE RADIATION THERAPY FLDS PLACEMENT: CPT | Performed by: STUDENT IN AN ORGANIZED HEALTH CARE EDUCATION/TRAINING PROGRAM

## 2024-03-06 PROCEDURE — 77386 HC INTENSITY-MODULATED RADIATION THERAPY (IMRT), COMPLEX: CPT | Performed by: STUDENT IN AN ORGANIZED HEALTH CARE EDUCATION/TRAINING PROGRAM

## 2024-03-07 ENCOUNTER — HOSPITAL ENCOUNTER (OUTPATIENT)
Dept: RADIATION ONCOLOGY | Facility: HOSPITAL | Age: 69
Setting detail: RADIATION/ONCOLOGY SERIES
Discharge: HOME | End: 2024-03-07
Payer: MEDICARE

## 2024-03-07 DIAGNOSIS — C54.0 MALIGNANT NEOPLASM OF ISTHMUS UTERI (MULTI): ICD-10-CM

## 2024-03-07 DIAGNOSIS — Z51.0 ENCOUNTER FOR ANTINEOPLASTIC RADIATION THERAPY: ICD-10-CM

## 2024-03-07 LAB
RAD ONC MSQ ACTUAL FRACTIONS DELIVERED: 18
RAD ONC MSQ ACTUAL SESSION DELIVERED DOSE: 180 CGRAY
RAD ONC MSQ ACTUAL TOTAL DOSE: 3240 CGRAY
RAD ONC MSQ ELAPSED DAYS: 23
RAD ONC MSQ LAST DATE: NORMAL
RAD ONC MSQ PRESCRIBED FRACTIONAL DOSE: 180 CGRAY
RAD ONC MSQ PRESCRIBED NUMBER OF FRACTIONS: 25
RAD ONC MSQ PRESCRIBED TECHNIQUE: NORMAL
RAD ONC MSQ PRESCRIBED TOTAL DOSE: 4500 CGRAY
RAD ONC MSQ PRESCRIPTION PATTERN COMMENT: NORMAL
RAD ONC MSQ START DATE: NORMAL
RAD ONC MSQ TREATMENT COURSE NUMBER: 1
RAD ONC MSQ TREATMENT SITE: NORMAL

## 2024-03-07 PROCEDURE — 77014 CHG CT GUIDANCE RADIATION THERAPY FLDS PLACEMENT: CPT | Performed by: STUDENT IN AN ORGANIZED HEALTH CARE EDUCATION/TRAINING PROGRAM

## 2024-03-07 PROCEDURE — 77386 HC INTENSITY-MODULATED RADIATION THERAPY (IMRT), COMPLEX: CPT | Performed by: STUDENT IN AN ORGANIZED HEALTH CARE EDUCATION/TRAINING PROGRAM

## 2024-03-08 ENCOUNTER — HOSPITAL ENCOUNTER (OUTPATIENT)
Dept: RADIATION ONCOLOGY | Facility: HOSPITAL | Age: 69
Setting detail: RADIATION/ONCOLOGY SERIES
Discharge: HOME | End: 2024-03-08
Payer: MEDICARE

## 2024-03-08 DIAGNOSIS — Z51.0 ENCOUNTER FOR ANTINEOPLASTIC RADIATION THERAPY: ICD-10-CM

## 2024-03-08 DIAGNOSIS — C54.0 MALIGNANT NEOPLASM OF ISTHMUS UTERI (MULTI): ICD-10-CM

## 2024-03-08 LAB
RAD ONC MSQ ACTUAL FRACTIONS DELIVERED: 19
RAD ONC MSQ ACTUAL SESSION DELIVERED DOSE: 180 CGRAY
RAD ONC MSQ ACTUAL TOTAL DOSE: 3420 CGRAY
RAD ONC MSQ ELAPSED DAYS: 24
RAD ONC MSQ LAST DATE: NORMAL
RAD ONC MSQ PRESCRIBED FRACTIONAL DOSE: 180 CGRAY
RAD ONC MSQ PRESCRIBED NUMBER OF FRACTIONS: 25
RAD ONC MSQ PRESCRIBED TECHNIQUE: NORMAL
RAD ONC MSQ PRESCRIBED TOTAL DOSE: 4500 CGRAY
RAD ONC MSQ PRESCRIPTION PATTERN COMMENT: NORMAL
RAD ONC MSQ START DATE: NORMAL
RAD ONC MSQ TREATMENT COURSE NUMBER: 1
RAD ONC MSQ TREATMENT SITE: NORMAL

## 2024-03-08 PROCEDURE — 77386 HC INTENSITY-MODULATED RADIATION THERAPY (IMRT), COMPLEX: CPT | Performed by: STUDENT IN AN ORGANIZED HEALTH CARE EDUCATION/TRAINING PROGRAM

## 2024-03-08 PROCEDURE — 77014 CHG CT GUIDANCE RADIATION THERAPY FLDS PLACEMENT: CPT | Performed by: STUDENT IN AN ORGANIZED HEALTH CARE EDUCATION/TRAINING PROGRAM

## 2024-03-11 ENCOUNTER — HOSPITAL ENCOUNTER (OUTPATIENT)
Dept: RADIATION ONCOLOGY | Facility: HOSPITAL | Age: 69
Setting detail: RADIATION/ONCOLOGY SERIES
Discharge: HOME | End: 2024-03-11
Payer: MEDICARE

## 2024-03-11 DIAGNOSIS — Z51.0 ENCOUNTER FOR ANTINEOPLASTIC RADIATION THERAPY: ICD-10-CM

## 2024-03-11 DIAGNOSIS — C54.0 MALIGNANT NEOPLASM OF ISTHMUS UTERI (MULTI): ICD-10-CM

## 2024-03-11 LAB
RAD ONC MSQ ACTUAL FRACTIONS DELIVERED: 20
RAD ONC MSQ ACTUAL SESSION DELIVERED DOSE: 180 CGRAY
RAD ONC MSQ ACTUAL TOTAL DOSE: 3600 CGRAY
RAD ONC MSQ ELAPSED DAYS: 27
RAD ONC MSQ LAST DATE: NORMAL
RAD ONC MSQ PRESCRIBED FRACTIONAL DOSE: 180 CGRAY
RAD ONC MSQ PRESCRIBED NUMBER OF FRACTIONS: 25
RAD ONC MSQ PRESCRIBED TECHNIQUE: NORMAL
RAD ONC MSQ PRESCRIBED TOTAL DOSE: 4500 CGRAY
RAD ONC MSQ PRESCRIPTION PATTERN COMMENT: NORMAL
RAD ONC MSQ START DATE: NORMAL
RAD ONC MSQ TREATMENT COURSE NUMBER: 1
RAD ONC MSQ TREATMENT SITE: NORMAL

## 2024-03-11 PROCEDURE — 77014 CHG CT GUIDANCE RADIATION THERAPY FLDS PLACEMENT: CPT | Performed by: STUDENT IN AN ORGANIZED HEALTH CARE EDUCATION/TRAINING PROGRAM

## 2024-03-11 PROCEDURE — 77386 HC INTENSITY-MODULATED RADIATION THERAPY (IMRT), COMPLEX: CPT | Performed by: STUDENT IN AN ORGANIZED HEALTH CARE EDUCATION/TRAINING PROGRAM

## 2024-03-12 ENCOUNTER — RADIATION ONCOLOGY OTV (OUTPATIENT)
Dept: RADIATION ONCOLOGY | Facility: HOSPITAL | Age: 69
End: 2024-03-12
Payer: MEDICARE

## 2024-03-12 ENCOUNTER — HOSPITAL ENCOUNTER (OUTPATIENT)
Dept: RADIATION ONCOLOGY | Facility: HOSPITAL | Age: 69
Setting detail: RADIATION/ONCOLOGY SERIES
Discharge: HOME | End: 2024-03-12
Payer: MEDICARE

## 2024-03-12 VITALS
OXYGEN SATURATION: 96 % | BODY MASS INDEX: 26.17 KG/M2 | RESPIRATION RATE: 18 BRPM | HEIGHT: 61 IN | SYSTOLIC BLOOD PRESSURE: 145 MMHG | HEART RATE: 89 BPM | DIASTOLIC BLOOD PRESSURE: 79 MMHG | WEIGHT: 138.6 LBS | TEMPERATURE: 96.8 F

## 2024-03-12 DIAGNOSIS — C54.1 MALIGNANT NEOPLASM OF ENDOMETRIUM (MULTI): ICD-10-CM

## 2024-03-12 DIAGNOSIS — Z51.0 ENCOUNTER FOR ANTINEOPLASTIC RADIATION THERAPY: ICD-10-CM

## 2024-03-12 DIAGNOSIS — C54.0 MALIGNANT NEOPLASM OF ISTHMUS UTERI (MULTI): ICD-10-CM

## 2024-03-12 LAB
RAD ONC MSQ ACTUAL FRACTIONS DELIVERED: 21
RAD ONC MSQ ACTUAL SESSION DELIVERED DOSE: 180 CGRAY
RAD ONC MSQ ACTUAL TOTAL DOSE: 3780 CGRAY
RAD ONC MSQ ELAPSED DAYS: 28
RAD ONC MSQ LAST DATE: NORMAL
RAD ONC MSQ PRESCRIBED FRACTIONAL DOSE: 180 CGRAY
RAD ONC MSQ PRESCRIBED NUMBER OF FRACTIONS: 25
RAD ONC MSQ PRESCRIBED TECHNIQUE: NORMAL
RAD ONC MSQ PRESCRIBED TOTAL DOSE: 4500 CGRAY
RAD ONC MSQ PRESCRIPTION PATTERN COMMENT: NORMAL
RAD ONC MSQ START DATE: NORMAL
RAD ONC MSQ TREATMENT COURSE NUMBER: 1
RAD ONC MSQ TREATMENT SITE: NORMAL

## 2024-03-12 PROCEDURE — 77427 RADIATION TX MANAGEMENT X5: CPT | Performed by: STUDENT IN AN ORGANIZED HEALTH CARE EDUCATION/TRAINING PROGRAM

## 2024-03-12 PROCEDURE — 77014 CHG CT GUIDANCE RADIATION THERAPY FLDS PLACEMENT: CPT | Performed by: STUDENT IN AN ORGANIZED HEALTH CARE EDUCATION/TRAINING PROGRAM

## 2024-03-12 PROCEDURE — 77386 HC INTENSITY-MODULATED RADIATION THERAPY (IMRT), COMPLEX: CPT | Performed by: STUDENT IN AN ORGANIZED HEALTH CARE EDUCATION/TRAINING PROGRAM

## 2024-03-13 ENCOUNTER — HOSPITAL ENCOUNTER (OUTPATIENT)
Dept: RADIATION ONCOLOGY | Facility: HOSPITAL | Age: 69
Setting detail: RADIATION/ONCOLOGY SERIES
Discharge: HOME | End: 2024-03-13
Payer: MEDICARE

## 2024-03-13 DIAGNOSIS — C54.0 MALIGNANT NEOPLASM OF ISTHMUS UTERI (MULTI): ICD-10-CM

## 2024-03-13 DIAGNOSIS — Z51.0 ENCOUNTER FOR ANTINEOPLASTIC RADIATION THERAPY: ICD-10-CM

## 2024-03-13 LAB
RAD ONC MSQ ACTUAL FRACTIONS DELIVERED: 22
RAD ONC MSQ ACTUAL SESSION DELIVERED DOSE: 180 CGRAY
RAD ONC MSQ ACTUAL TOTAL DOSE: 3960 CGRAY
RAD ONC MSQ ELAPSED DAYS: 29
RAD ONC MSQ LAST DATE: NORMAL
RAD ONC MSQ PRESCRIBED FRACTIONAL DOSE: 180 CGRAY
RAD ONC MSQ PRESCRIBED NUMBER OF FRACTIONS: 25
RAD ONC MSQ PRESCRIBED TECHNIQUE: NORMAL
RAD ONC MSQ PRESCRIBED TOTAL DOSE: 4500 CGRAY
RAD ONC MSQ PRESCRIPTION PATTERN COMMENT: NORMAL
RAD ONC MSQ START DATE: NORMAL
RAD ONC MSQ TREATMENT COURSE NUMBER: 1
RAD ONC MSQ TREATMENT SITE: NORMAL

## 2024-03-13 PROCEDURE — 77386 HC INTENSITY-MODULATED RADIATION THERAPY (IMRT), COMPLEX: CPT | Performed by: STUDENT IN AN ORGANIZED HEALTH CARE EDUCATION/TRAINING PROGRAM

## 2024-03-13 PROCEDURE — 77336 RADIATION PHYSICS CONSULT: CPT | Performed by: STUDENT IN AN ORGANIZED HEALTH CARE EDUCATION/TRAINING PROGRAM

## 2024-03-13 PROCEDURE — 77014 CHG CT GUIDANCE RADIATION THERAPY FLDS PLACEMENT: CPT | Performed by: STUDENT IN AN ORGANIZED HEALTH CARE EDUCATION/TRAINING PROGRAM

## 2024-03-13 NOTE — PROGRESS NOTES
"Radiation Oncology On Treatment Visit    Patient Name:  Mica Vanessa  MRN:  [id number]  :  1955    Referring Provider: No ref. provider found  Primary Care Provider: Nurys Hector MD  Care Team: Patient Care Team:  Nurys Hector MD as PCP - General  Nannette Palacio MD as Obstetrician (Obstetrics and Gynecology)  Kris Plata MD as Consulting Physician (Hematology and Oncology)    Date of Service: 3/12/2024     Diagnosis:   Specialty Problems          Radiation Oncology Problems    Malignant neoplasm of breast (CMS/HCC)        Malignant neoplasm of endometrium (CMS/HCC)         Treatment Summary:  Radiation Treatments       Active   Pelvis (Started on 2024)   Most recent fraction: 180 cGy given on 3/12/2024   Total given: 3,780 cGy / 4,500 cGy  (21 of 25 fractions)   Elapsed Days: 28   Technique: VMAT           Completed   No historical radiation treatments to show.             SUBJECTIVE: Pt is doing well occasional lose bowel movements. Good energy      OBJECTIVE:   Vital Signs:  /79   Pulse 89   Temp 36 °C (96.8 °F) (Temporal)   Resp 18   Ht 1.549 m (5' 1\")   Wt 62.9 kg (138 lb 9.6 oz)   SpO2 96%   BMI 26.19 kg/m²    Pain Scale: The patient's current pain level was assessed.  They report currently having a pain of 0 out of 10.    Other Pertinent Findings:     Toxicity Assessment          2/15/2024    09:48 2024    16:05 2024    16:35 3/5/2024    15:48 3/12/2024    15:59   Toxicity Assessment   Adverse Events Reviewed (WDL) No (Exceptions to WDL) No (Exceptions to WDL) No (Exceptions to WDL) No (Exceptions to WDL) No (Exceptions to WDL)   Treatment Site Pelvis - female Pelvis - female Pelvis - female Pelvis - female Pelvis - female   Anorexia Grade 0 Grade 0 Grade 0 Grade 1       decreased appetite s/p upper respiratory virus Grade 0   Anxiety Grade 0  Grade 0 Grade 0 Grade 0   Dehydration Grade 0 Grade 0 Grade 0 Grade 0 Grade 0   Depression Grade 0       Dermatitis " "Radiation Grade 0 Grade 0 Grade 0 Grade 0 Grade 0   Diarrhea Grade 0 Grade 1       occasional loose stool Grade 0 Grade 0       occasional loose BM Grade 1       more frequent , thin stools   Fatigue Grade 0 Grade 0 Grade 0 Grade 1       increased fatigue, rest as needed, activity as tolerated Grade 1       pt on antibiotics for upper respiratory infection   Nausea Grade 0 Grade 0       had 1 episode last week that resolved without intervention Grade 0  Grade 0   Pain Grade 0 Grade 0       chronic back pain is unchanged, takes Ibuprofen as needed Grade 0  Grade 0   Vomiting Grade 0 Grade 0 Grade 0     Lower Gastrointestinal Hemorrhage Grade 0       Urinary Incontinence Grade 0 Grade 0       No urinary complaints. Grade 0     Pelvic Pain Grade 0       Chronic Kidney Disease    --        pt reports occasional \"weird, pressure like feeling\" when urinating, denies any other urinary symptoms --        some discomfort with urination, will try Azo   Vaginal Hemorrhage Grade 0 Grade 0 Grade 0 Grade 0 Grade 0        Assessment / Plan:  The patient is tolerating radiation therapy as anticipated.  Continue per current treatment plan.     Pt to follow up with Dr. Palacio 3/25  Discussed she will see Alma Pendleton 1 month after completion of RT     "

## 2024-03-14 ENCOUNTER — HOSPITAL ENCOUNTER (OUTPATIENT)
Dept: RADIATION ONCOLOGY | Facility: HOSPITAL | Age: 69
Setting detail: RADIATION/ONCOLOGY SERIES
Discharge: HOME | End: 2024-03-14
Payer: MEDICARE

## 2024-03-14 DIAGNOSIS — Z51.0 ENCOUNTER FOR ANTINEOPLASTIC RADIATION THERAPY: ICD-10-CM

## 2024-03-14 DIAGNOSIS — C54.0 MALIGNANT NEOPLASM OF ISTHMUS UTERI (MULTI): ICD-10-CM

## 2024-03-14 LAB
RAD ONC MSQ ACTUAL FRACTIONS DELIVERED: 23
RAD ONC MSQ ACTUAL SESSION DELIVERED DOSE: 180 CGRAY
RAD ONC MSQ ACTUAL TOTAL DOSE: 4140 CGRAY
RAD ONC MSQ ELAPSED DAYS: 30
RAD ONC MSQ LAST DATE: NORMAL
RAD ONC MSQ PRESCRIBED FRACTIONAL DOSE: 180 CGRAY
RAD ONC MSQ PRESCRIBED NUMBER OF FRACTIONS: 25
RAD ONC MSQ PRESCRIBED TECHNIQUE: NORMAL
RAD ONC MSQ PRESCRIBED TOTAL DOSE: 4500 CGRAY
RAD ONC MSQ PRESCRIPTION PATTERN COMMENT: NORMAL
RAD ONC MSQ START DATE: NORMAL
RAD ONC MSQ TREATMENT COURSE NUMBER: 1
RAD ONC MSQ TREATMENT SITE: NORMAL

## 2024-03-14 PROCEDURE — 77386 HC INTENSITY-MODULATED RADIATION THERAPY (IMRT), COMPLEX: CPT | Performed by: STUDENT IN AN ORGANIZED HEALTH CARE EDUCATION/TRAINING PROGRAM

## 2024-03-14 PROCEDURE — 77014 CHG CT GUIDANCE RADIATION THERAPY FLDS PLACEMENT: CPT | Performed by: STUDENT IN AN ORGANIZED HEALTH CARE EDUCATION/TRAINING PROGRAM

## 2024-03-15 ENCOUNTER — HOSPITAL ENCOUNTER (OUTPATIENT)
Dept: RADIATION ONCOLOGY | Facility: HOSPITAL | Age: 69
Setting detail: RADIATION/ONCOLOGY SERIES
Discharge: HOME | End: 2024-03-15
Payer: MEDICARE

## 2024-03-15 DIAGNOSIS — C54.0 MALIGNANT NEOPLASM OF ISTHMUS UTERI (MULTI): ICD-10-CM

## 2024-03-15 DIAGNOSIS — Z51.0 ENCOUNTER FOR ANTINEOPLASTIC RADIATION THERAPY: ICD-10-CM

## 2024-03-15 LAB
RAD ONC MSQ ACTUAL FRACTIONS DELIVERED: 24
RAD ONC MSQ ACTUAL SESSION DELIVERED DOSE: 180 CGRAY
RAD ONC MSQ ACTUAL TOTAL DOSE: 4320 CGRAY
RAD ONC MSQ ELAPSED DAYS: 31
RAD ONC MSQ LAST DATE: NORMAL
RAD ONC MSQ PRESCRIBED FRACTIONAL DOSE: 180 CGRAY
RAD ONC MSQ PRESCRIBED NUMBER OF FRACTIONS: 25
RAD ONC MSQ PRESCRIBED TECHNIQUE: NORMAL
RAD ONC MSQ PRESCRIBED TOTAL DOSE: 4500 CGRAY
RAD ONC MSQ PRESCRIPTION PATTERN COMMENT: NORMAL
RAD ONC MSQ START DATE: NORMAL
RAD ONC MSQ TREATMENT COURSE NUMBER: 1
RAD ONC MSQ TREATMENT SITE: NORMAL

## 2024-03-15 PROCEDURE — 77386 HC INTENSITY-MODULATED RADIATION THERAPY (IMRT), COMPLEX: CPT | Performed by: STUDENT IN AN ORGANIZED HEALTH CARE EDUCATION/TRAINING PROGRAM

## 2024-03-15 PROCEDURE — 77014 CHG CT GUIDANCE RADIATION THERAPY FLDS PLACEMENT: CPT | Performed by: STUDENT IN AN ORGANIZED HEALTH CARE EDUCATION/TRAINING PROGRAM

## 2024-03-16 ENCOUNTER — APPOINTMENT (OUTPATIENT)
Dept: CARDIOLOGY | Facility: HOSPITAL | Age: 69
End: 2024-03-16
Payer: MEDICARE

## 2024-03-16 ENCOUNTER — HOSPITAL ENCOUNTER (EMERGENCY)
Facility: HOSPITAL | Age: 69
Discharge: HOME | End: 2024-03-16
Attending: STUDENT IN AN ORGANIZED HEALTH CARE EDUCATION/TRAINING PROGRAM
Payer: MEDICARE

## 2024-03-16 ENCOUNTER — APPOINTMENT (OUTPATIENT)
Dept: RADIOLOGY | Facility: HOSPITAL | Age: 69
End: 2024-03-16
Payer: MEDICARE

## 2024-03-16 VITALS
DIASTOLIC BLOOD PRESSURE: 74 MMHG | SYSTOLIC BLOOD PRESSURE: 118 MMHG | BODY MASS INDEX: 25.76 KG/M2 | OXYGEN SATURATION: 95 % | WEIGHT: 140 LBS | HEART RATE: 63 BPM | TEMPERATURE: 97.2 F | RESPIRATION RATE: 18 BRPM | HEIGHT: 62 IN

## 2024-03-16 DIAGNOSIS — M94.0 COSTOCHONDRITIS, ACUTE: Primary | ICD-10-CM

## 2024-03-16 LAB
ALBUMIN SERPL BCP-MCNC: 4.6 G/DL (ref 3.4–5)
ALP SERPL-CCNC: 78 U/L (ref 33–136)
ALT SERPL W P-5'-P-CCNC: 11 U/L (ref 7–45)
ANION GAP SERPL CALC-SCNC: 11 MMOL/L (ref 10–20)
AST SERPL W P-5'-P-CCNC: 16 U/L (ref 9–39)
BASOPHILS # BLD AUTO: 0.02 X10*3/UL (ref 0–0.1)
BASOPHILS NFR BLD AUTO: 0.4 %
BILIRUB SERPL-MCNC: 0.3 MG/DL (ref 0–1.2)
BUN SERPL-MCNC: 18 MG/DL (ref 6–23)
CALCIUM SERPL-MCNC: 9.5 MG/DL (ref 8.6–10.3)
CARDIAC TROPONIN I PNL SERPL HS: 3 NG/L (ref 0–13)
CARDIAC TROPONIN I PNL SERPL HS: 4 NG/L (ref 0–13)
CHLORIDE SERPL-SCNC: 101 MMOL/L (ref 98–107)
CO2 SERPL-SCNC: 28 MMOL/L (ref 21–32)
CREAT SERPL-MCNC: 0.59 MG/DL (ref 0.5–1.05)
D DIMER PPP FEU-MCNC: 506 NG/ML FEU
EGFRCR SERPLBLD CKD-EPI 2021: >90 ML/MIN/1.73M*2
EOSINOPHIL # BLD AUTO: 0.07 X10*3/UL (ref 0–0.7)
EOSINOPHIL NFR BLD AUTO: 1.4 %
ERYTHROCYTE [DISTWIDTH] IN BLOOD BY AUTOMATED COUNT: 12.2 % (ref 11.5–14.5)
FLUAV RNA RESP QL NAA+PROBE: NOT DETECTED
FLUBV RNA RESP QL NAA+PROBE: NOT DETECTED
GLUCOSE SERPL-MCNC: 104 MG/DL (ref 74–99)
HCT VFR BLD AUTO: 41 % (ref 36–46)
HGB BLD-MCNC: 13.5 G/DL (ref 12–16)
HOLD SPECIMEN: NORMAL
IMM GRANULOCYTES # BLD AUTO: 0.02 X10*3/UL (ref 0–0.7)
IMM GRANULOCYTES NFR BLD AUTO: 0.4 % (ref 0–0.9)
INR PPP: 1.1 (ref 0.9–1.1)
LYMPHOCYTES # BLD AUTO: 0.58 X10*3/UL (ref 1.2–4.8)
LYMPHOCYTES NFR BLD AUTO: 11.4 %
MAGNESIUM SERPL-MCNC: 1.56 MG/DL (ref 1.6–2.4)
MCH RBC QN AUTO: 28.5 PG (ref 26–34)
MCHC RBC AUTO-ENTMCNC: 32.9 G/DL (ref 32–36)
MCV RBC AUTO: 87 FL (ref 80–100)
MONOCYTES # BLD AUTO: 0.37 X10*3/UL (ref 0.1–1)
MONOCYTES NFR BLD AUTO: 7.3 %
NEUTROPHILS # BLD AUTO: 4.03 X10*3/UL (ref 1.2–7.7)
NEUTROPHILS NFR BLD AUTO: 79.1 %
NRBC BLD-RTO: 0 /100 WBCS (ref 0–0)
PLATELET # BLD AUTO: 323 X10*3/UL (ref 150–450)
POTASSIUM SERPL-SCNC: 3.3 MMOL/L (ref 3.5–5.3)
PROT SERPL-MCNC: 7.2 G/DL (ref 6.4–8.2)
PROTHROMBIN TIME: 11.9 SECONDS (ref 9.8–12.8)
RBC # BLD AUTO: 4.74 X10*6/UL (ref 4–5.2)
RSV RNA RESP QL NAA+PROBE: NOT DETECTED
SARS-COV-2 RNA RESP QL NAA+PROBE: NOT DETECTED
SODIUM SERPL-SCNC: 137 MMOL/L (ref 136–145)
WBC # BLD AUTO: 5.1 X10*3/UL (ref 4.4–11.3)

## 2024-03-16 PROCEDURE — 82310 ASSAY OF CALCIUM: CPT | Performed by: STUDENT IN AN ORGANIZED HEALTH CARE EDUCATION/TRAINING PROGRAM

## 2024-03-16 PROCEDURE — 85379 FIBRIN DEGRADATION QUANT: CPT | Performed by: STUDENT IN AN ORGANIZED HEALTH CARE EDUCATION/TRAINING PROGRAM

## 2024-03-16 PROCEDURE — 93005 ELECTROCARDIOGRAM TRACING: CPT

## 2024-03-16 PROCEDURE — 36415 COLL VENOUS BLD VENIPUNCTURE: CPT | Performed by: STUDENT IN AN ORGANIZED HEALTH CARE EDUCATION/TRAINING PROGRAM

## 2024-03-16 PROCEDURE — 85025 COMPLETE CBC W/AUTO DIFF WBC: CPT | Performed by: STUDENT IN AN ORGANIZED HEALTH CARE EDUCATION/TRAINING PROGRAM

## 2024-03-16 PROCEDURE — 83735 ASSAY OF MAGNESIUM: CPT | Performed by: STUDENT IN AN ORGANIZED HEALTH CARE EDUCATION/TRAINING PROGRAM

## 2024-03-16 PROCEDURE — 84484 ASSAY OF TROPONIN QUANT: CPT | Performed by: STUDENT IN AN ORGANIZED HEALTH CARE EDUCATION/TRAINING PROGRAM

## 2024-03-16 PROCEDURE — 71045 X-RAY EXAM CHEST 1 VIEW: CPT

## 2024-03-16 PROCEDURE — 2500000002 HC RX 250 W HCPCS SELF ADMINISTERED DRUGS (ALT 637 FOR MEDICARE OP, ALT 636 FOR OP/ED): Performed by: STUDENT IN AN ORGANIZED HEALTH CARE EDUCATION/TRAINING PROGRAM

## 2024-03-16 PROCEDURE — 71045 X-RAY EXAM CHEST 1 VIEW: CPT | Performed by: RADIOLOGY

## 2024-03-16 PROCEDURE — 2500000004 HC RX 250 GENERAL PHARMACY W/ HCPCS (ALT 636 FOR OP/ED): Performed by: STUDENT IN AN ORGANIZED HEALTH CARE EDUCATION/TRAINING PROGRAM

## 2024-03-16 PROCEDURE — 99283 EMERGENCY DEPT VISIT LOW MDM: CPT | Mod: 25

## 2024-03-16 PROCEDURE — 85610 PROTHROMBIN TIME: CPT | Performed by: STUDENT IN AN ORGANIZED HEALTH CARE EDUCATION/TRAINING PROGRAM

## 2024-03-16 PROCEDURE — 87637 SARSCOV2&INF A&B&RSV AMP PRB: CPT | Performed by: STUDENT IN AN ORGANIZED HEALTH CARE EDUCATION/TRAINING PROGRAM

## 2024-03-16 RX ORDER — LANOLIN ALCOHOL/MO/W.PET/CERES
400 CREAM (GRAM) TOPICAL ONCE
Status: COMPLETED | OUTPATIENT
Start: 2024-03-16 | End: 2024-03-16

## 2024-03-16 RX ORDER — IBUPROFEN 600 MG/1
600 TABLET ORAL ONCE
Status: DISCONTINUED | OUTPATIENT
Start: 2024-03-16 | End: 2024-03-16 | Stop reason: HOSPADM

## 2024-03-16 RX ORDER — IBUPROFEN 600 MG/1
600 TABLET ORAL EVERY 6 HOURS PRN
Qty: 28 TABLET | Refills: 0 | Status: SHIPPED | OUTPATIENT
Start: 2024-03-16 | End: 2024-03-23

## 2024-03-16 RX ORDER — POTASSIUM CHLORIDE 20 MEQ/1
40 TABLET, EXTENDED RELEASE ORAL DAILY
Status: DISCONTINUED | OUTPATIENT
Start: 2024-03-16 | End: 2024-03-16 | Stop reason: HOSPADM

## 2024-03-16 RX ADMIN — Medication 400 MG: at 18:02

## 2024-03-16 RX ADMIN — POTASSIUM CHLORIDE 40 MEQ: 1500 TABLET, EXTENDED RELEASE ORAL at 18:02

## 2024-03-16 ASSESSMENT — PAIN DESCRIPTION - FREQUENCY: FREQUENCY: INTERMITTENT

## 2024-03-16 ASSESSMENT — PAIN - FUNCTIONAL ASSESSMENT
PAIN_FUNCTIONAL_ASSESSMENT: 0-10
PAIN_FUNCTIONAL_ASSESSMENT: 0-10

## 2024-03-16 ASSESSMENT — PAIN DESCRIPTION - ORIENTATION: ORIENTATION: LEFT

## 2024-03-16 ASSESSMENT — COLUMBIA-SUICIDE SEVERITY RATING SCALE - C-SSRS
1. SINCE LAST CONTACT, HAVE YOU WISHED YOU WERE DEAD OR WISHED YOU COULD GO TO SLEEP AND NOT WAKE UP?: NO
2. HAVE YOU ACTUALLY HAD ANY THOUGHTS OF KILLING YOURSELF?: NO
1. IN THE PAST MONTH, HAVE YOU WISHED YOU WERE DEAD OR WISHED YOU COULD GO TO SLEEP AND NOT WAKE UP?: NO
6. HAVE YOU EVER DONE ANYTHING, STARTED TO DO ANYTHING, OR PREPARED TO DO ANYTHING TO END YOUR LIFE?: NO
2. HAVE YOU ACTUALLY HAD ANY THOUGHTS OF KILLING YOURSELF?: NO
6. HAVE YOU EVER DONE ANYTHING, STARTED TO DO ANYTHING, OR PREPARED TO DO ANYTHING TO END YOUR LIFE?: NO

## 2024-03-16 ASSESSMENT — PAIN DESCRIPTION - PAIN TYPE: TYPE: ACUTE PAIN

## 2024-03-16 ASSESSMENT — PAIN DESCRIPTION - LOCATION: LOCATION: CHEST

## 2024-03-16 ASSESSMENT — PAIN DESCRIPTION - DESCRIPTORS
DESCRIPTORS: ACHING;MISERABLE
DESCRIPTORS: ACHING;DISCOMFORT;RADIATING
DESCRIPTORS: SPASM;SQUEEZING

## 2024-03-16 ASSESSMENT — PAIN SCALES - GENERAL
PAINLEVEL_OUTOF10: 3
PAINLEVEL_OUTOF10: 4
PAINLEVEL_OUTOF10: 4

## 2024-03-16 ASSESSMENT — LIFESTYLE VARIABLES
HAVE YOU EVER FELT YOU SHOULD CUT DOWN ON YOUR DRINKING: NO
HAVE PEOPLE ANNOYED YOU BY CRITICIZING YOUR DRINKING: NO
EVER HAD A DRINK FIRST THING IN THE MORNING TO STEADY YOUR NERVES TO GET RID OF A HANGOVER: NO
EVER FELT BAD OR GUILTY ABOUT YOUR DRINKING: NO

## 2024-03-16 ASSESSMENT — PAIN DESCRIPTION - ONSET: ONSET: PROGRESSIVE

## 2024-03-16 NOTE — DISCHARGE INSTRUCTIONS
If you develop fevers, if you have nausea and vomiting that prevents you from keeping fluids down, if your chest pain does not improve, or if you become significantly short of breath, or if your symptoms worsen, or for any additional concerns, return to the emergency department for evaluation.    Otherwise follow-up with your primary care provider for reevaluation

## 2024-03-17 NOTE — ED PROVIDER NOTES
EMERGENCY DEPARTMENT ENCOUNTER      Pt Name: Mica Vanessa  MRN: [id number]  Birthdate 1955  Date of evaluation: 3/16/2024  Provider: Neema Blue DO    CHIEF COMPLAINT       Chief Complaint   Patient presents with    Chest Pain     Under left breast / left arm.  Nitro/ASA given by EMS         HISTORY OF PRESENT ILLNESS    68-year-old female smoker who presents to the emergency department with sudden sharp left-sided chest pain that started after a bout of coughing.  She has been on doxycycline for bronchitis for the last several days.  Denies fevers, nausea, vomiting, or headaches.  Pain is worse when she takes a deep breath.          Nursing Notes were reviewed.    PAST MEDICAL HISTORY     Past Medical History:   Diagnosis Date    Abnormal ECG     hx of    Arthritis     Khan esophagus     Breast cancer (CMS/HCC)     chemo/radiation right sided lumpectomy    Dysfunctional uterine bleeding     Essential (primary) hypertension 2022    Benign essential hypertension    GERD (gastroesophageal reflux disease)     Hyperlipidemia     Osteopenia     Other seasonal allergic rhinitis     Seasonal allergies    Personal history of malignant neoplasm of breast     History of malignant neoplasm of breast    Personal history of other diseases of the circulatory system     History of hypertension    Personal history of other diseases of the digestive system     History of gastrointestinal disorder         SURGICAL HISTORY       Past Surgical History:   Procedure Laterality Date    LUMBAR PERITONEAL SHUNT      OTHER SURGICAL HISTORY  2022     section    OTHER SURGICAL HISTORY  2022    Tonsillectomy    OTHER SURGICAL HISTORY  2022    Lumpectomy    SKIN CANCER EXCISION      vulva    TUBAL LIGATION           CURRENT MEDICATIONS       Discharge Medication List as of 3/16/2024  8:07 PM        CONTINUE these medications which have NOT CHANGED    Details   calcium carbonate (Oscal) 500 mg  calcium (1,250 mg) tablet Take 1 tablet (1,250 mg) by mouth once daily., Historical Med      esomeprazole (NexIUM) 20 mg DR capsule Take 1 capsule (20 mg) by mouth once daily., Historical Med      fluticasone (Flonase) 50 mcg/actuation nasal spray Administer into affected nostril(s). Administer into affected nostril(s)., Historical Med      gemfibrozil (Lopid) 600 mg tablet Take 1 tablet (600 mg) by mouth once daily., Historical Med      lisinopril 20 mg tablet Take 1 tablet (20 mg) by mouth once daily., Historical Med      multivitamin (Daily Multi-Vitamin) tablet Take 1 tablet by mouth once daily., Historical Med      pseudoephedrine HCl (SUDAFED 12 HOUR ORAL) Take by mouth., Historical Med      sennosides (Senokot) 8.6 mg tablet Take 1 tablet (8.6 mg) by mouth once daily., Starting 2024, Until Wed 1/15/2025, Normal             ALLERGIES     Patient has no known allergies.    FAMILY HISTORY       Family History   Problem Relation Name Age of Onset    Osteoporosis Mother      Thyroid disease Mother      Hypertension Father      Stroke Other Grandparent     Hypertension Other Grandparent     Breast cancer Child      Breast cancer Sibling            SOCIAL HISTORY       Social History     Socioeconomic History    Marital status: Legally      Spouse name: Not on file    Number of children: Not on file    Years of education: Not on file    Highest education level: Not on file   Occupational History    Not on file   Tobacco Use    Smoking status: Former     Types: Cigarettes     Quit date:      Years since quittin.2    Smokeless tobacco: Never   Vaping Use    Vaping Use: Former   Substance and Sexual Activity    Alcohol use: Not Currently    Drug use: Never    Sexual activity: Defer   Other Topics Concern    Not on file   Social History Narrative    Not on file     Social Determinants of Health     Financial Resource Strain: Not on file   Food Insecurity: Not on file   Transportation Needs:  Not on file   Physical Activity: Not on file   Stress: Not on file   Social Connections: Not on file   Intimate Partner Violence: Not on file   Housing Stability: Not on file       SCREENINGS                        PHYSICAL EXAM    (up to 7 for level 4, 8 or more for level 5)     ED Triage Vitals [03/16/24 1617]   Temperature Heart Rate Respirations BP   36.2 °C (97.2 °F) 79 18 133/61      Pulse Ox Temp Source Heart Rate Source Patient Position   96 % Temporal Monitor Lying      BP Location FiO2 (%)     Left arm --       Physical Exam  Vitals and nursing note reviewed.   Constitutional:       General: She is not in acute distress.     Appearance: She is well-developed.   HENT:      Head: Normocephalic and atraumatic.   Eyes:      Conjunctiva/sclera: Conjunctivae normal.   Cardiovascular:      Rate and Rhythm: Normal rate and regular rhythm.      Heart sounds: No murmur heard.  Pulmonary:      Effort: Pulmonary effort is normal. No respiratory distress.      Breath sounds: Normal breath sounds.   Abdominal:      Palpations: Abdomen is soft.      Tenderness: There is no abdominal tenderness.   Musculoskeletal:         General: No swelling.      Cervical back: Neck supple.   Skin:     General: Skin is warm and dry.      Capillary Refill: Capillary refill takes less than 2 seconds.   Neurological:      Mental Status: She is alert.   Psychiatric:         Mood and Affect: Mood normal.          DIAGNOSTIC RESULTS     LABS:  Labs Reviewed   MAGNESIUM - Abnormal       Result Value    Magnesium 1.56 (*)    COMPREHENSIVE METABOLIC PANEL - Abnormal    Glucose 104 (*)     Sodium 137      Potassium 3.3 (*)     Chloride 101      Bicarbonate 28      Anion Gap 11      Urea Nitrogen 18      Creatinine 0.59      eGFR >90      Calcium 9.5      Albumin 4.6      Alkaline Phosphatase 78      Total Protein 7.2      AST 16      Bilirubin, Total 0.3      ALT 11     CBC WITH AUTO DIFFERENTIAL - Abnormal    WBC 5.1      nRBC 0.0      RBC 4.74       Hemoglobin 13.5      Hematocrit 41.0      MCV 87      MCH 28.5      MCHC 32.9      RDW 12.2      Platelets 323      Neutrophils % 79.1      Immature Granulocytes %, Automated 0.4      Lymphocytes % 11.4      Monocytes % 7.3      Eosinophils % 1.4      Basophils % 0.4      Neutrophils Absolute 4.03      Immature Granulocytes Absolute, Automated 0.02      Lymphocytes Absolute 0.58 (*)     Monocytes Absolute 0.37      Eosinophils Absolute 0.07      Basophils Absolute 0.02     D-DIMER, VTE EXCLUSION - Abnormal    D-Dimer, Quantitative VTE Exclusion 506 (*)     Narrative:     The VTE Exclusion D-Dimer assay is reported in ng/mL Fibrinogen Equivalent Units (FEU).    Per 's instructions for use, a value of less than 500 ng/mL (FEU) may help to exclude DVT or PE in outpatients when the assay is used with a clinical pretest probability assessment.([facility] must utilize and document eCalc 'Wells Score Deep Vein Thrombosis Risk' for DVT exclusion only. Emergency Department should utilize  Guidelines for Emergency Department Use of the VTE Exclusion D-Dimer and Clinical Pretest probability assessment model for DVT or PE exclusion.)   PROTIME-INR - Normal    Protime 11.9      INR 1.1     SERIAL TROPONIN-INITIAL - Normal    Troponin I, High Sensitivity 4      Narrative:     Less than 99th percentile of normal range cutoff-  Female and children under 18 years old <14 ng/L; Male <21 ng/L: Negative  Repeat testing should be performed if clinically indicated.     Female and children under 18 years old 14-50 ng/L; Male 21-50 ng/L:  Consistent with possible cardiac damage and possible increased clinical   risk. Serial measurements may help to assess extent of myocardial damage.     >50 ng/L: Consistent with cardiac damage, increased clinical risk and  myocardial infarction. Serial measurements may help assess extent of   myocardial damage.      NOTE: Children less than 1 year old may have higher baseline troponin   levels  and results should be interpreted in conjunction with the overall   clinical context.     NOTE: Troponin I testing is performed using a different   testing methodology at [facility] than at other   [facility]. Direct result comparisons should only   be made within the same method.   SERIAL TROPONIN, 1 HOUR - Normal    Troponin I, High Sensitivity 3      Narrative:     Less than 99th percentile of normal range cutoff-  Female and children under 18 years old <14 ng/L; Male <21 ng/L: Negative  Repeat testing should be performed if clinically indicated.     Female and children under 18 years old 14-50 ng/L; Male 21-50 ng/L:  Consistent with possible cardiac damage and possible increased clinical   risk. Serial measurements may help to assess extent of myocardial damage.     >50 ng/L: Consistent with cardiac damage, increased clinical risk and  myocardial infarction. Serial measurements may help assess extent of   myocardial damage.      NOTE: Children less than 1 year old may have higher baseline troponin   levels and results should be interpreted in conjunction with the overall   clinical context.     NOTE: Troponin I testing is performed using a different   testing methodology at [facility] than at other   [facility]. Direct result comparisons should only   be made within the same method.   RSV PCR - Normal    RSV PCR Not Detected      Narrative:     This assay is an FDA-cleared, in vitro diagnostic nucleic acid amplification test for the detection of RSV from nasopharyngeal specimens, and has been validated for use at [facility]. Negative results do not preclude RSV infections, and should not be used as the sole basis for diagnosis, treatment, or other management decisions. If Influenza A/B and RSV PCR results are negative, testing for Parainfluenza virus, Adenovirus and Metapneumovirus is routinely performed for pediatric oncology and intensive care  inpatients at [facility], and is available on other patients by placing an add-on request.       SARS-COV-2 AND INFLUENZA A/B PCR - Normal    Flu A Result Not Detected      Flu B Result Not Detected      Coronavirus 2019, PCR Not Detected      Narrative:     This assay has received FDA Emergency Use Authorization (EUA) and  is only authorized for the duration of time that circumstances exist to justify the authorization of the emergency use of in vitro diagnostic tests for the detection of SARS-CoV-2 virus and/or diagnosis of COVID-19 infection under section 564(b)(1) of the Act, 21 U.S.C. 360bbb-3(b)(1). Testing for SARS-CoV-2 is only recommended for patients who meet current clinical and/or epidemiological criteria as defined by federal, state, or local public health directives. This assay is an in vitro diagnostic nucleic acid amplification test for the qualitative detection of SARS-CoV-2, Influenza A, and Influenza B from nasopharyngeal specimens and has been validated for use at [facility]. Negative results do not preclude COVID-19 infections or Influenza A/B infections, and should not be used as the sole basis for diagnosis, treatment, or other management decisions. If Influenza A/B and RSV PCR results are negative, testing for Parainfluenza virus, Adenovirus and Metapneumovirus is routinely performed for [facility] pediatric oncology and intensive care inpatients, and is available on other patients by placing an add-on request.    TROPONIN SERIES- (INITIAL, 1 HR)    Narrative:     The following orders were created for panel order Troponin I Series, High Sensitivity (0, 1 HR).  Procedure                               Abnormality         Status                     ---------                               -----------         ------                     Troponin I, High Sensiti...[[id number]]  Normal              Final result               Troponin, High Sensitivi...[[id number]]  Normal              Final  result                 Please view results for these tests on the individual orders.   GREEN TOP    Extra Tube Hold for add-ons.         All other labs were within normal range or not returned as of this dictation.    Imaging  XR chest 1 view   Final Result   No acute cardiopulmonary disease.        MACRO:   none        Signed by: Hanna Fuller 3/16/2024 4:44 PM   Dictation workstation:   [id number]           Procedures  Procedures     EMERGENCY DEPARTMENT COURSE/MDM:   Mica Vanessa is a 68 y.o. female presenting to the ED for evaluation of had concerns including Chest Pain (Under left breast / left arm.  Nitro/ASA given by EMS)..   Medical Decision Making  68-year-old female smoker who presents to the emergency department with sudden onset left-sided chest pain that began when she coughed. Mildly hypokalemic, repleted orally, moderately hypomagnesemic which will be repleted orally.  D-dimer is negative per years criteria.  She has no leukocytosis, no signs of anemia, no significant thrombocytopenia.  Chest x-ray within normal limits without visible rib fracture or pneumothorax no subcutaneous emphysema.     On reevaluation after her ibuprofen, patient felt improved, and discussed these findings with her and discharged in stable condition with return precautions        ED Course as of 03/16/24 2218   Sat Mar 16, 2024   1645 D-Dimer, Quantitative VTE Exclusion(!): 506  Low risk by years criteria [AS]   1801 Troponin I, High Sensitivity: 4  Trop is negative.  Repeat pending [AS]   1802 MAGNESIUM(!): 1.56  Oral dose given here [AS]   1802 POTASSIUM(!): 3.3  Oral dose given here [AS]   1802 XR chest 1 view  No obvious rib fractures or pneumothorax or soft tissue gas.  No pneumonias.  Suspect pleurisy versus costochondritis, given ibuprofen [AS]      ED Course User Index  [AS] Neema Blue DO         Diagnoses as of 03/16/24 2218   Costochondritis, acute          External records reviewed: I reviewed external records  including outpatient, PCP records, and prior discharge summaries    I have reviewed this case with the ED attending physician, and the attending agrees with the plan. Patient or family was counselled regarding labs, imaging, likely diagnosis, and plan. All questions were answered.     Neema Blue DO  PGY-4, emergency medicine    The above documentation was completed with the use of speech recognition software. It may contain dictation errors secondary to limitations of the software.      ED Medications administered this visit:    Medications   ibuprofen tablet 600 mg (600 mg oral Not Given 3/16/24 1745)   potassium chloride CR (Klor-Con M20) ER tablet 40 mEq (40 mEq oral Given 3/16/24 1802)   magnesium oxide (Mag-Ox) tablet 400 mg (400 mg oral Given 3/16/24 1802)       New Prescriptions from this visit:    Discharge Medication List as of 3/16/2024  8:07 PM        START taking these medications    Details   ibuprofen 600 mg tablet Take 1 tablet (600 mg) by mouth every 6 hours if needed (Chest pain.  You must take this with food.) for up to 7 days., Starting Sat 3/16/2024, Until Sat 3/23/2024 at 2359, Normal             Final Impression:   1. Costochondritis, acute          (Please note that portions of this note were completed with a voice recognition program.  Efforts were made to edit the dictations but occasionally words are mis-transcribed.)     Neema Blue DO  Resident  03/16/24 5190

## 2024-03-18 ENCOUNTER — DOCUMENTATION (OUTPATIENT)
Dept: RADIATION ONCOLOGY | Facility: HOSPITAL | Age: 69
End: 2024-03-18
Payer: MEDICARE

## 2024-03-18 ENCOUNTER — HOSPITAL ENCOUNTER (OUTPATIENT)
Dept: RADIATION ONCOLOGY | Facility: HOSPITAL | Age: 69
Setting detail: RADIATION/ONCOLOGY SERIES
Discharge: HOME | End: 2024-03-18
Payer: MEDICARE

## 2024-03-18 DIAGNOSIS — Z51.0 ENCOUNTER FOR ANTINEOPLASTIC RADIATION THERAPY: ICD-10-CM

## 2024-03-18 DIAGNOSIS — C54.0 MALIGNANT NEOPLASM OF ISTHMUS UTERI (MULTI): ICD-10-CM

## 2024-03-18 LAB
RAD ONC MSQ ACTUAL FRACTIONS DELIVERED: 25
RAD ONC MSQ ACTUAL SESSION DELIVERED DOSE: 180 CGRAY
RAD ONC MSQ ACTUAL TOTAL DOSE: 4500 CGRAY
RAD ONC MSQ ELAPSED DAYS: 34
RAD ONC MSQ LAST DATE: NORMAL
RAD ONC MSQ PRESCRIBED FRACTIONAL DOSE: 180 CGRAY
RAD ONC MSQ PRESCRIBED NUMBER OF FRACTIONS: 25
RAD ONC MSQ PRESCRIBED TECHNIQUE: NORMAL
RAD ONC MSQ PRESCRIBED TOTAL DOSE: 4500 CGRAY
RAD ONC MSQ PRESCRIPTION PATTERN COMMENT: NORMAL
RAD ONC MSQ START DATE: NORMAL
RAD ONC MSQ TREATMENT COURSE NUMBER: 1
RAD ONC MSQ TREATMENT SITE: NORMAL

## 2024-03-18 PROCEDURE — 77386 HC INTENSITY-MODULATED RADIATION THERAPY (IMRT), COMPLEX: CPT | Performed by: RADIOLOGY

## 2024-03-18 PROCEDURE — 77014 CHG CT GUIDANCE RADIATION THERAPY FLDS PLACEMENT: CPT | Performed by: RADIOLOGY

## 2024-03-18 NOTE — PROGRESS NOTES
Radiation Oncology Treatment Summary    Patient Name:  Mica Vanessa  MRN:  [id number]  :  1955    Radiation Oncologist: Dr. Ruma Shabazz   Referring Provider: Dr. Nannette Palacio  Primary Care Provider: Nurys Hector MD    Brief History: Mica Vanessa is a 68 y.o. 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 Pt originally presented with PMB ~1 year ago. EMB was non diagnostic. Subsequent polypectomy 10/06/2023 consistent with endometrial adenocarcinoma, mesonephric like type, MMR intact, p53 wt. Pt then underwent LSC Hyst BSO 2023. Pathology confirmed mesonephric type endometrial carcinoma; >50% MI with extensive LVSI. Of note, patient also has history of breast cancer; she had a restaging CT c/a/p for breast cancer 2023 where no abnormalities were seen.  The patient completed radiotherapy as outlined below.    Radiation Treatment Summary:    Treatment dates: 24-3/18/24  Treatment site: Pelvis  Fractions:   Dose: 4500 cGy  Technique: VMAT    Concurrent Chemotherapy:  N/A    CTCAE Toxicity Overview:   Toxicity Assessment          2/15/2024    09:48 2024    16:05 2024    16:35 3/5/2024    15:48 3/12/2024    15:59   Toxicity Assessment   Adverse Events Reviewed (WDL) No (Exceptions to WDL) No (Exceptions to WDL) No (Exceptions to WDL) No (Exceptions to WDL) No (Exceptions to WDL)   Treatment Site Pelvis - female Pelvis - female Pelvis - female Pelvis - female Pelvis - female   Anorexia Grade 0 Grade 0 Grade 0 Grade 1       decreased appetite s/p upper respiratory virus Grade 0   Anxiety Grade 0  Grade 0 Grade 0 Grade 0   Dehydration Grade 0 Grade 0 Grade 0 Grade 0 Grade 0   Depression Grade 0       Dermatitis Radiation Grade 0 Grade 0 Grade 0 Grade 0 Grade 0   Diarrhea Grade 0 Grade 1        "occasional loose stool Grade 0 Grade 0       occasional loose BM Grade 1       more frequent , thin stools   Fatigue Grade 0 Grade 0 Grade 0 Grade 1       increased fatigue, rest as needed, activity as tolerated Grade 1       pt on antibiotics for upper respiratory infection   Nausea Grade 0 Grade 0       had 1 episode last week that resolved without intervention Grade 0  Grade 0   Pain Grade 0 Grade 0       chronic back pain is unchanged, takes Ibuprofen as needed Grade 0  Grade 0   Vomiting Grade 0 Grade 0 Grade 0     Lower Gastrointestinal Hemorrhage Grade 0       Urinary Incontinence Grade 0 Grade 0       No urinary complaints. Grade 0     Pelvic Pain Grade 0       Chronic Kidney Disease    --        pt reports occasional \"weird, pressure like feeling\" when urinating, denies any other urinary symptoms --        some discomfort with urination, will try Azo   Vaginal Hemorrhage Grade 0 Grade 0 Grade 0 Grade 0 Grade 0     Patient Disposition:   Future Appointments       Date / Time Provider Department Dept Phone    3/25/2024 8:20 AM Nannette Palacio MD [facility]  [phone]    4/15/2024 3:00 PM Alma Pendleton, APRN-CNP [facility] [phone]    7/23/2024 10:00 AM (Arrive by 9:50 AM) [facility]MO 2 [facility] [phone]    7/26/2024 11:30 AM PAR [facility] MARC3  [facility] [place] [facility] 3 [phone]    7/31/2024 10:30 AM Kris Plata MD [facility] [phone]           "

## 2024-03-25 ENCOUNTER — OFFICE VISIT (OUTPATIENT)
Dept: GYNECOLOGIC ONCOLOGY | Facility: CLINIC | Age: 69
End: 2024-03-25
Payer: MEDICARE

## 2024-03-25 VITALS
OXYGEN SATURATION: 96 % | SYSTOLIC BLOOD PRESSURE: 133 MMHG | DIASTOLIC BLOOD PRESSURE: 93 MMHG | RESPIRATION RATE: 16 BRPM | HEART RATE: 89 BPM | WEIGHT: 141.31 LBS | TEMPERATURE: 97.9 F | BODY MASS INDEX: 25.85 KG/M2

## 2024-03-25 DIAGNOSIS — C54.1 MALIGNANT NEOPLASM OF ENDOMETRIUM (MULTI): Primary | ICD-10-CM

## 2024-03-25 PROCEDURE — 3075F SYST BP GE 130 - 139MM HG: CPT | Performed by: OBSTETRICS & GYNECOLOGY

## 2024-03-25 PROCEDURE — 1036F TOBACCO NON-USER: CPT | Performed by: OBSTETRICS & GYNECOLOGY

## 2024-03-25 PROCEDURE — 99214 OFFICE O/P EST MOD 30 MIN: CPT | Performed by: OBSTETRICS & GYNECOLOGY

## 2024-03-25 PROCEDURE — 1159F MED LIST DOCD IN RCRD: CPT | Performed by: OBSTETRICS & GYNECOLOGY

## 2024-03-25 PROCEDURE — 3080F DIAST BP >= 90 MM HG: CPT | Performed by: OBSTETRICS & GYNECOLOGY

## 2024-03-25 PROCEDURE — 1160F RVW MEDS BY RX/DR IN RCRD: CPT | Performed by: OBSTETRICS & GYNECOLOGY

## 2024-03-25 PROCEDURE — 1126F AMNT PAIN NOTED NONE PRSNT: CPT | Performed by: OBSTETRICS & GYNECOLOGY

## 2024-03-25 ASSESSMENT — PAIN SCALES - GENERAL: PAINLEVEL: 0-NO PAIN

## 2024-03-25 NOTE — PROGRESS NOTES
cPatient ID: Mica Vanessa is a 68 y.o. female.  Referring Physician: No referring provider defined for this encounter.  Primary Care Provider: Nurys Hector MD    Subjective    Referred by Dr. Parkinson    69 yo with uterine cancer    Cancer history:  10/6/23 EMB w/ uterine cancer, mesonephric type  10/21/23 Total laparoscopic hysterectomy bilateral salpingo oophorectomy, Bilateral sentinel lymph node injection and mapping, Left pelvic sentinel lymph node dissection and right complete pelvic lymphadenectomy, Cystourethroscopy.  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 MMRp, P53 alison type. TB recommendations for radiation oncology consult, genetics referral, consider systemic therapy  Adjuvant pelvic RT      PMH  Breast cancer -required surgery chemotherapy and radiation  Hypertension  Arthritis    PSH  C/s X 3  Removal of vulvar SCC  Lumpectomy  OB/Gyn hx  G3, P3  x3.  No prior history of abnormal Pap.  Menopausal since her 50s    FH  Sister and daughter both had breast cancer.  They both had genetic testing which was negative.  The patient also had breast cancer but has not had genetic testing.  Feels cancer could be related to exposure to chemicals in her youth.    SH  Former smoker denies alcohol or illicit drug use.  Lives alone.  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  Will consider imaging after completion of pelvic RT and discuss risk/benefit of systemic therapy in setting of higher risk histologic subtype vs. Close surveillance    Overall feeling well    Some residual bowel symptoms after radiation    Energy levels improving    No vaginal bleeding            Objective    BSA: 1.67 meters squared  BP (!) 133/93 Comment:  notified  Pulse 89   Temp 36.6 °C (97.9 °F)   Resp 16   Wt 64.1 kg (141 lb 5 oz)   SpO2 96%   BMI 25.85 kg/m²      Physical Exam  Constitutional:       Appearance: Normal  appearance.   Cardiovascular:      Rate and Rhythm: Normal rate.      Heart sounds: Normal heart sounds.   Pulmonary:      Effort: Pulmonary effort is normal.      Breath sounds: Normal breath sounds.   Abdominal:      General: Abdomen is flat. There is no distension.      Palpations: There is no mass.      Tenderness: There is no abdominal tenderness.   Genitourinary:     General: Normal vulva.      Exam position: Lithotomy position.      Uterus: Absent.       Comments: Cervix and uterine surgically absent.  No lesions involving the vaginal cuff.  No masses on bimanual or rectovaginal exam  Musculoskeletal:      Cervical back: Normal range of motion.   Skin:     General: Skin is warm and dry.   Neurological:      Mental Status: She is alert.   Psychiatric:         Mood and Affect: Mood normal.         Behavior: Behavior normal.         Performance Status:  Symptomatic; fully ambulatory    Assessment/Plan     Oncology History    No history exists.        Problem List Items Addressed This Visit             ICD-10-CM    Malignant neoplasm of endometrium (CMS/HCC) - Primary C54.1    Relevant Orders    CT chest abdomen pelvis w IV contrast       Treatment Plans       No treatment plans exist        We reviewed natural history and prognosis of endometrial cancer.  Discussed implications of histologic subtype on prognosis.  We reviewed risks and benefits of chemotherapy and her clinical situation.    We came to a shared decision to repeat imaging in July 2024.  I will see her in the office to review results.  No plan for systemic treatment at this time.    Follow-up after CT

## 2024-03-29 LAB
ATRIAL RATE: 58 BPM
ATRIAL RATE: 64 BPM
P AXIS: 50 DEGREES
P AXIS: 52 DEGREES
P OFFSET: 165 MS
P OFFSET: 168 MS
P ONSET: 115 MS
P ONSET: 119 MS
PR INTERVAL: 188 MS
PR INTERVAL: 190 MS
Q ONSET: 210 MS
Q ONSET: 213 MS
QRS COUNT: 10 BEATS
QRS COUNT: 11 BEATS
QRS DURATION: 84 MS
QRS DURATION: 86 MS
QT INTERVAL: 422 MS
QT INTERVAL: 428 MS
QTC CALCULATION(BAZETT): 420 MS
QTC CALCULATION(BAZETT): 435 MS
QTC FREDERICIA: 423 MS
QTC FREDERICIA: 431 MS
R AXIS: -49 DEGREES
R AXIS: -54 DEGREES
T AXIS: 28 DEGREES
T AXIS: 35 DEGREES
T OFFSET: 421 MS
T OFFSET: 427 MS
VENTRICULAR RATE: 58 BPM
VENTRICULAR RATE: 64 BPM

## 2024-04-12 ENCOUNTER — TELEPHONE (OUTPATIENT)
Dept: RADIATION ONCOLOGY | Facility: HOSPITAL | Age: 69
End: 2024-04-12
Payer: MEDICARE

## 2024-04-15 ENCOUNTER — HOSPITAL ENCOUNTER (OUTPATIENT)
Dept: RADIATION ONCOLOGY | Facility: HOSPITAL | Age: 69
Setting detail: RADIATION/ONCOLOGY SERIES
Discharge: HOME | End: 2024-04-15
Payer: MEDICARE

## 2024-04-15 VITALS
BODY MASS INDEX: 25.31 KG/M2 | WEIGHT: 138.4 LBS | SYSTOLIC BLOOD PRESSURE: 127 MMHG | RESPIRATION RATE: 18 BRPM | OXYGEN SATURATION: 95 % | DIASTOLIC BLOOD PRESSURE: 77 MMHG | TEMPERATURE: 97.9 F | HEART RATE: 95 BPM

## 2024-04-15 DIAGNOSIS — C54.1 MALIGNANT NEOPLASM OF ENDOMETRIUM (MULTI): Primary | ICD-10-CM

## 2024-04-15 PROCEDURE — 99214 OFFICE O/P EST MOD 30 MIN: CPT | Performed by: NURSE PRACTITIONER

## 2024-04-15 ASSESSMENT — ENCOUNTER SYMPTOMS
FLANK PAIN: 0
NAUSEA: 0
ABDOMINAL DISTENTION: 0
DEPRESSION: 0
PSYCHIATRIC NEGATIVE: 1
FATIGUE: 0
CHILLS: 0
BLOOD IN STOOL: 0
APPETITE CHANGE: 0
DIAPHORESIS: 0
LOSS OF SENSATION IN FEET: 0
HEMATOLOGIC/LYMPHATIC NEGATIVE: 1
RESPIRATORY NEGATIVE: 1
ACTIVITY CHANGE: 0
UNEXPECTED WEIGHT CHANGE: 0
DIFFICULTY URINATING: 0
FREQUENCY: 0
ABDOMINAL PAIN: 0
OCCASIONAL FEELINGS OF UNSTEADINESS: 0
DIARRHEA: 0
FEVER: 0
RECTAL PAIN: 0
CONSTIPATION: 0
ANAL BLEEDING: 0
NEUROLOGICAL NEGATIVE: 1
HEMATURIA: 0
DYSURIA: 0
MUSCULOSKELETAL NEGATIVE: 1
CARDIOVASCULAR NEGATIVE: 1

## 2024-04-15 ASSESSMENT — PATIENT HEALTH QUESTIONNAIRE - PHQ9
2. FEELING DOWN, DEPRESSED OR HOPELESS: NOT AT ALL
SUM OF ALL RESPONSES TO PHQ9 QUESTIONS 1 AND 2: 0
1. LITTLE INTEREST OR PLEASURE IN DOING THINGS: NOT AT ALL

## 2024-04-15 ASSESSMENT — PAIN SCALES - GENERAL: PAINLEVEL: 0-NO PAIN

## 2024-04-15 NOTE — PROGRESS NOTES
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 Intermediate score on Oncotype DX. 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 female who presents today for follow up one month s/p pelvic radiation for endometrial cancer. Patient is doing well. She was seen in the ED in March for chest pain. Patient diagnosed with acute costochondritis. Discharged with NSAIDS. Symptoms resolved. No issues since.  Energy baseline. Appetite good. Weight stable. Denies pelvic pain, vaginal bleeding or discharge. Denies hematuria and dysuria. Endorses leakage. This remains unchanged from prior to treatment. She does Kegel exercises. Bowels baseline. No rectal bleeding. Patient is not sexually active and has not received the vaginal dilators yet. Imaging and follow up with Dr. Palacio in July. Patient follows with Dr. Plata for history of breast cancer. Next follow up in July.       Review of systems:  Review of Systems   Constitutional:  Negative for activity change, appetite change, chills, diaphoresis, " fatigue, fever and unexpected weight change.   HENT: Negative.     Respiratory: Negative.     Cardiovascular: Negative.    Gastrointestinal:  Negative for abdominal distention, abdominal pain, anal bleeding, blood in stool, constipation, diarrhea, nausea and rectal pain.   Genitourinary:  Positive for enuresis. Negative for decreased urine volume, difficulty urinating, dysuria, flank pain, frequency, hematuria, pelvic pain, urgency, vaginal bleeding, vaginal discharge and vaginal pain.   Musculoskeletal: Negative.    Skin: Negative.    Neurological: Negative.    Hematological: Negative.    Psychiatric/Behavioral: Negative.         Past Medical history  She  has a past surgical history that includes Other surgical history (11/23/2022); Other surgical history (11/23/2022); Other surgical history (11/23/2022); Lumbar peritoneal shunt; Tubal ligation; and Skin cancer excision (2002).      Last recorded vital:  /77   Pulse 95   Temp 36.6 °C (97.9 °F) (Skin)   Resp 18   Wt 62.8 kg (138 lb 6.4 oz)   SpO2 95%   BMI 25.31 kg/m²     Physical exam  Physical Exam  Constitutional:       General: She is not in acute distress.     Appearance: Normal appearance. She is not ill-appearing, toxic-appearing or diaphoretic.   HENT:      Head: Normocephalic.   Cardiovascular:      Rate and Rhythm: Normal rate and regular rhythm.      Pulses: Normal pulses.      Heart sounds: Normal heart sounds.   Pulmonary:      Effort: Pulmonary effort is normal.      Breath sounds: Normal breath sounds.   Abdominal:      General: Bowel sounds are normal. There is no distension.      Palpations: Abdomen is soft. There is no mass.      Tenderness: There is no abdominal tenderness. There is no guarding or rebound.      Hernia: No hernia is present.   Genitourinary:     General: Normal vulva.      Pubic Area: No rash.       Labia:         Right: No rash, tenderness, lesion or injury.         Left: No rash, tenderness, lesion or injury.        Urethra: No prolapse, urethral pain, urethral swelling or urethral lesion.      Vagina: No vaginal discharge.      Comments: Cervix, uterus, adnexa removed surgically.  Vaginal cuff intact  with no lesions. Smooth vaginal walls with no lesions. Vulva with no lesions. No pain or blood with exam.    Musculoskeletal:         General: Normal range of motion.      Cervical back: Normal range of motion and neck supple.   Lymphadenopathy:      Lower Body: No right inguinal adenopathy. No left inguinal adenopathy.   Skin:     General: Skin is warm and dry.   Neurological:      General: No focal deficit present.      Mental Status: She is alert and oriented to person, place, and time.   Psychiatric:         Mood and Affect: Mood normal.         Behavior: Behavior normal.         Thought Content: Thought content normal.         Judgment: Judgment normal.             Plan:  Assessment/Plan   68 year old female one month s/p Pelvic RT for endometrial cancer. Patient is doing well with no acute complaints. UTE on exam. Discussed treatment, side effects, follow up and when to call. Discussed importance of vaginal dilators and instructed on their use. Patient to begin using 3x per week for 5-10 min at a time. Handout and set of dilators given. Continue to do Kegel exercises. Continue imaging and follow up with Gyn onc and Dr. Plata as scheduled. Patient to return to clinic in 6 months unless needs to be seen sooner. Instructed to call with any questions or concerns.

## 2024-04-16 NOTE — ADDENDUM NOTE
Encounter addended by: Alma Pendleton, SANDHYA-CAT on: 4/16/2024 10:16 AM   Actions taken: Order list changed

## 2024-06-24 ENCOUNTER — LAB (OUTPATIENT)
Dept: LAB | Facility: LAB | Age: 69
End: 2024-06-24
Payer: MEDICARE

## 2024-06-24 DIAGNOSIS — C54.1 MALIGNANT NEOPLASM OF ENDOMETRIUM (MULTI): ICD-10-CM

## 2024-06-24 LAB
ANION GAP SERPL CALC-SCNC: 13 MMOL/L (ref 10–20)
BUN SERPL-MCNC: 17 MG/DL (ref 6–23)
CALCIUM SERPL-MCNC: 9.2 MG/DL (ref 8.6–10.6)
CHLORIDE SERPL-SCNC: 104 MMOL/L (ref 98–107)
CO2 SERPL-SCNC: 28 MMOL/L (ref 21–32)
CREAT SERPL-MCNC: 0.77 MG/DL (ref 0.5–1.05)
EGFRCR SERPLBLD CKD-EPI 2021: 84 ML/MIN/1.73M*2
GLUCOSE SERPL-MCNC: 83 MG/DL (ref 74–99)
POTASSIUM SERPL-SCNC: 4.1 MMOL/L (ref 3.5–5.3)
SODIUM SERPL-SCNC: 141 MMOL/L (ref 136–145)

## 2024-06-24 PROCEDURE — 80048 BASIC METABOLIC PNL TOTAL CA: CPT

## 2024-06-24 PROCEDURE — 36415 COLL VENOUS BLD VENIPUNCTURE: CPT

## 2024-07-01 ENCOUNTER — HOSPITAL ENCOUNTER (OUTPATIENT)
Dept: RADIOLOGY | Facility: HOSPITAL | Age: 69
Discharge: HOME | End: 2024-07-01
Payer: MEDICARE

## 2024-07-01 DIAGNOSIS — C54.1 MALIGNANT NEOPLASM OF ENDOMETRIUM (MULTI): ICD-10-CM

## 2024-07-01 PROCEDURE — 74177 CT ABD & PELVIS W/CONTRAST: CPT | Performed by: RADIOLOGY

## 2024-07-01 PROCEDURE — 2550000001 HC RX 255 CONTRASTS: Performed by: OBSTETRICS & GYNECOLOGY

## 2024-07-01 PROCEDURE — 71260 CT THORAX DX C+: CPT | Performed by: RADIOLOGY

## 2024-07-01 PROCEDURE — 74177 CT ABD & PELVIS W/CONTRAST: CPT

## 2024-07-01 PROCEDURE — A9698 NON-RAD CONTRAST MATERIALNOC: HCPCS | Performed by: OBSTETRICS & GYNECOLOGY

## 2024-07-08 ENCOUNTER — APPOINTMENT (OUTPATIENT)
Dept: GYNECOLOGIC ONCOLOGY | Facility: CLINIC | Age: 69
End: 2024-07-08
Payer: MEDICARE

## 2024-07-08 VITALS
SYSTOLIC BLOOD PRESSURE: 130 MMHG | OXYGEN SATURATION: 95 % | BODY MASS INDEX: 24.64 KG/M2 | RESPIRATION RATE: 16 BRPM | TEMPERATURE: 98.6 F | DIASTOLIC BLOOD PRESSURE: 70 MMHG | HEART RATE: 92 BPM | WEIGHT: 134.7 LBS

## 2024-07-08 DIAGNOSIS — C54.1 MALIGNANT NEOPLASM OF ENDOMETRIUM (MULTI): Primary | ICD-10-CM

## 2024-07-08 DIAGNOSIS — C54.1 ENDOMETRIAL CANCER (MULTI): ICD-10-CM

## 2024-07-08 PROCEDURE — 99213 OFFICE O/P EST LOW 20 MIN: CPT | Performed by: OBSTETRICS & GYNECOLOGY

## 2024-07-08 PROCEDURE — 1159F MED LIST DOCD IN RCRD: CPT | Performed by: OBSTETRICS & GYNECOLOGY

## 2024-07-08 PROCEDURE — 3078F DIAST BP <80 MM HG: CPT | Performed by: OBSTETRICS & GYNECOLOGY

## 2024-07-08 PROCEDURE — 3075F SYST BP GE 130 - 139MM HG: CPT | Performed by: OBSTETRICS & GYNECOLOGY

## 2024-07-08 NOTE — PROGRESS NOTES
Patient ID: Mica Vanessa is a 68 y.o. female.  Referring Physician: No referring provider defined for this encounter.  Primary Care Provider: Nurys Hector MD    Subjective    Referred by Dr. Parkinson    69 yo with uterine cancer    Cancer history:  10/6/23 EMB w/ uterine cancer, mesonephric type  10/21/23 Total laparoscopic hysterectomy bilateral salpingo oophorectomy, Bilateral sentinel lymph node injection and mapping, Left pelvic sentinel lymph node dissection and right complete pelvic lymphadenectomy, Cystourethroscopy.  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 MMRp, P53 alison type. TB recommendations for radiation oncology consult, genetics referral, consider systemic therapy  Adjuvant pelvic RT  2024 - new pulmonary nodules, all less than 1cm      PMH  Breast cancer -required surgery chemotherapy and radiation  Hypertension  Arthritis    PSH  C/s X 3  Removal of vulvar SCC  Lumpectomy  OB/Gyn hx  G3, P3  x3.  No prior history of abnormal Pap.  Menopausal since her 50s    FH  Sister and daughter both had breast cancer.  They both had genetic testing which was negative.  The patient also had breast cancer but has not had genetic testing.  Feels cancer could be related to exposure to chemicals in her youth.    SH  Former smoker denies alcohol or illicit drug use.  Lives alone.  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  She is doing well.  Denied vaginal bleeding or pelvic pain.  She denies fever, chills, chest pain, SOB, nausea, vomiting, diarrhea, constipation, dysuria, or any other concerning signs of symptoms.      Objective    BSA: 1.63 meters squared  /70 (BP Location: Left arm, Patient Position: Sitting, BP Cuff Size: Adult)   Pulse 92   Temp 37 °C (98.6 °F) (Temporal)   Resp 16   Wt 61.1 kg (134 lb 11.2 oz)   SpO2 95%   BMI 24.64 kg/m²      Physical Exam  Constitutional:        Appearance: Normal appearance. She is normal weight.   HENT:      Head: Normocephalic.   Cardiovascular:      Rate and Rhythm: Normal rate and regular rhythm.   Pulmonary:      Effort: Pulmonary effort is normal. No respiratory distress.      Breath sounds: Normal breath sounds.   Abdominal:      General: Abdomen is flat. Bowel sounds are normal.      Palpations: Abdomen is soft.   Genitourinary:     Comments: Normal external genitalia, normal vaginal mucosa, no masses or lesions  No abdominal or pelvic masses palpated on bimanual exam  Musculoskeletal:      Cervical back: Normal range of motion.   Neurological:      Mental Status: She is alert.         Performance Status:  Asymptomatic    Assessment/Plan   67yo pt with Stage IB Mesonephric-like adenocarcinoma of uterus; MMI >50%; Extensive LVSI; MMRp, p53WT, s/p pelvic RT, here for surveillance    Oncology History    No history exists.     -Doing well with no new signs or symptoms of recurrence.  -Normal exam  -Discussed pulm nodules on CT - unclear etiology, plan to repeat chest CT in 3-4 months   -RTC 3-4 mo    Adelfo Gonzalez MD  Seen with Dr. Palacio    I saw and evaluated the patient. I personally obtained the key and critical portions of the history and physical exam or was physically present for key and critical portions performed by the resident/fellow. I reviewed the resident/fellow's documentation and discussed the patient with the resident/fellow. I agree with the resident/fellow's medical decision making as documented in the note.

## 2024-07-23 ENCOUNTER — HOSPITAL ENCOUNTER (OUTPATIENT)
Dept: RADIOLOGY | Facility: HOSPITAL | Age: 69
Discharge: HOME | End: 2024-07-23
Payer: MEDICARE

## 2024-07-23 VITALS — WEIGHT: 134.7 LBS | HEIGHT: 62 IN | BODY MASS INDEX: 24.79 KG/M2

## 2024-07-23 DIAGNOSIS — Z12.31 ENCOUNTER FOR SCREENING MAMMOGRAM FOR MALIGNANT NEOPLASM OF BREAST: ICD-10-CM

## 2024-07-23 PROCEDURE — 77063 BREAST TOMOSYNTHESIS BI: CPT | Performed by: RADIOLOGY

## 2024-07-23 PROCEDURE — 77067 SCR MAMMO BI INCL CAD: CPT | Performed by: RADIOLOGY

## 2024-07-23 PROCEDURE — 77067 SCR MAMMO BI INCL CAD: CPT

## 2024-07-24 ENCOUNTER — HOSPITAL ENCOUNTER (OUTPATIENT)
Dept: RADIOLOGY | Facility: EXTERNAL LOCATION | Age: 69
Discharge: HOME | End: 2024-07-24

## 2024-07-26 ENCOUNTER — LAB (OUTPATIENT)
Dept: LAB | Facility: CLINIC | Age: 69
End: 2024-07-26
Payer: MEDICARE

## 2024-07-26 DIAGNOSIS — C50.919 MALIGNANT NEOPLASM OF FEMALE BREAST, UNSPECIFIED ESTROGEN RECEPTOR STATUS, UNSPECIFIED LATERALITY, UNSPECIFIED SITE OF BREAST (MULTI): ICD-10-CM

## 2024-07-26 DIAGNOSIS — C54.1 MALIGNANT NEOPLASM OF ENDOMETRIUM (MULTI): ICD-10-CM

## 2024-07-26 LAB
ALBUMIN SERPL BCP-MCNC: 4.4 G/DL (ref 3.4–5)
ALP SERPL-CCNC: 68 U/L (ref 33–136)
ALT SERPL W P-5'-P-CCNC: 12 U/L (ref 7–45)
ANION GAP SERPL CALC-SCNC: 14 MMOL/L (ref 10–20)
AST SERPL W P-5'-P-CCNC: 16 U/L (ref 9–39)
BASOPHILS # BLD AUTO: 0.03 X10*3/UL (ref 0–0.1)
BASOPHILS NFR BLD AUTO: 0.7 %
BILIRUB SERPL-MCNC: 0.4 MG/DL (ref 0–1.2)
BUN SERPL-MCNC: 20 MG/DL (ref 6–23)
CALCIUM SERPL-MCNC: 9.4 MG/DL (ref 8.6–10.3)
CANCER AG27-29 SERPL-ACNC: 33.8 U/ML (ref 0–38.6)
CEA SERPL-MCNC: 1.4 UG/L
CHLORIDE SERPL-SCNC: 104 MMOL/L (ref 98–107)
CO2 SERPL-SCNC: 24 MMOL/L (ref 21–32)
CREAT SERPL-MCNC: 0.64 MG/DL (ref 0.5–1.05)
EGFRCR SERPLBLD CKD-EPI 2021: >90 ML/MIN/1.73M*2
EOSINOPHIL # BLD AUTO: 0.13 X10*3/UL (ref 0–0.7)
EOSINOPHIL NFR BLD AUTO: 3.2 %
ERYTHROCYTE [DISTWIDTH] IN BLOOD BY AUTOMATED COUNT: 11.9 % (ref 11.5–14.5)
GLUCOSE SERPL-MCNC: 89 MG/DL (ref 74–99)
HCT VFR BLD AUTO: 41.8 % (ref 36–46)
HGB BLD-MCNC: 14.2 G/DL (ref 12–16)
IMM GRANULOCYTES # BLD AUTO: 0.01 X10*3/UL (ref 0–0.7)
IMM GRANULOCYTES NFR BLD AUTO: 0.2 % (ref 0–0.9)
LYMPHOCYTES # BLD AUTO: 0.88 X10*3/UL (ref 1.2–4.8)
LYMPHOCYTES NFR BLD AUTO: 21.8 %
MCH RBC QN AUTO: 30 PG (ref 26–34)
MCHC RBC AUTO-ENTMCNC: 34 G/DL (ref 32–36)
MCV RBC AUTO: 88 FL (ref 80–100)
MONOCYTES # BLD AUTO: 0.31 X10*3/UL (ref 0.1–1)
MONOCYTES NFR BLD AUTO: 7.7 %
NEUTROPHILS # BLD AUTO: 2.68 X10*3/UL (ref 1.2–7.7)
NEUTROPHILS NFR BLD AUTO: 66.4 %
NRBC BLD-RTO: 0 /100 WBCS (ref 0–0)
PLATELET # BLD AUTO: 278 X10*3/UL (ref 150–450)
POTASSIUM SERPL-SCNC: 4.5 MMOL/L (ref 3.5–5.3)
PROT SERPL-MCNC: 6.6 G/DL (ref 6.4–8.2)
RBC # BLD AUTO: 4.73 X10*6/UL (ref 4–5.2)
SODIUM SERPL-SCNC: 137 MMOL/L (ref 136–145)
WBC # BLD AUTO: 4 X10*3/UL (ref 4.4–11.3)

## 2024-07-26 PROCEDURE — 86300 IMMUNOASSAY TUMOR CA 15-3: CPT | Mod: PARLAB

## 2024-07-26 PROCEDURE — 85025 COMPLETE CBC W/AUTO DIFF WBC: CPT

## 2024-07-26 PROCEDURE — 82378 CARCINOEMBRYONIC ANTIGEN: CPT | Mod: PARLAB

## 2024-07-26 PROCEDURE — 84075 ASSAY ALKALINE PHOSPHATASE: CPT

## 2024-07-26 PROCEDURE — 36415 COLL VENOUS BLD VENIPUNCTURE: CPT

## 2024-07-31 ENCOUNTER — OFFICE VISIT (OUTPATIENT)
Dept: HEMATOLOGY/ONCOLOGY | Facility: CLINIC | Age: 69
End: 2024-07-31
Payer: MEDICARE

## 2024-07-31 VITALS
TEMPERATURE: 97.2 F | BODY MASS INDEX: 24.76 KG/M2 | DIASTOLIC BLOOD PRESSURE: 63 MMHG | OXYGEN SATURATION: 96 % | HEART RATE: 72 BPM | WEIGHT: 135.36 LBS | RESPIRATION RATE: 18 BRPM | SYSTOLIC BLOOD PRESSURE: 135 MMHG

## 2024-07-31 DIAGNOSIS — D69.6 THROMBOCYTOPENIA (CMS-HCC): ICD-10-CM

## 2024-07-31 DIAGNOSIS — C50.919 MALIGNANT NEOPLASM OF FEMALE BREAST, UNSPECIFIED ESTROGEN RECEPTOR STATUS, UNSPECIFIED LATERALITY, UNSPECIFIED SITE OF BREAST (MULTI): ICD-10-CM

## 2024-07-31 DIAGNOSIS — C54.1 MALIGNANT NEOPLASM OF ENDOMETRIUM (MULTI): ICD-10-CM

## 2024-07-31 PROCEDURE — 3075F SYST BP GE 130 - 139MM HG: CPT | Performed by: INTERNAL MEDICINE

## 2024-07-31 PROCEDURE — 1126F AMNT PAIN NOTED NONE PRSNT: CPT | Performed by: INTERNAL MEDICINE

## 2024-07-31 PROCEDURE — 99214 OFFICE O/P EST MOD 30 MIN: CPT | Performed by: INTERNAL MEDICINE

## 2024-07-31 PROCEDURE — 1036F TOBACCO NON-USER: CPT | Performed by: INTERNAL MEDICINE

## 2024-07-31 PROCEDURE — 3078F DIAST BP <80 MM HG: CPT | Performed by: INTERNAL MEDICINE

## 2024-07-31 PROCEDURE — 1159F MED LIST DOCD IN RCRD: CPT | Performed by: INTERNAL MEDICINE

## 2024-07-31 ASSESSMENT — PAIN SCALES - GENERAL: PAINLEVEL: 0-NO PAIN

## 2024-07-31 ASSESSMENT — COLUMBIA-SUICIDE SEVERITY RATING SCALE - C-SSRS
2. HAVE YOU ACTUALLY HAD ANY THOUGHTS OF KILLING YOURSELF?: NO
6. HAVE YOU EVER DONE ANYTHING, STARTED TO DO ANYTHING, OR PREPARED TO DO ANYTHING TO END YOUR LIFE?: NO
1. IN THE PAST MONTH, HAVE YOU WISHED YOU WERE DEAD OR WISHED YOU COULD GO TO SLEEP AND NOT WAKE UP?: NO

## 2024-07-31 NOTE — PROGRESS NOTES
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 Intermediate score on Oncotype DX. Systemic adjuvant chemotherapy. She was on tamoxifen from October 2007 in October 2000 and and switched to Arimidex original diagnosis in May 2007.     History of lobular carcinoma in situ of left breast in May 2003.     The patient's 32-year-old daughter was diagnosed as having ER, NH, HER negative breast cancer in July 2016. She received neoadjuvant chemotherapy with CA, followed by Taxol. She was placed on maintenance Xeloda for 6 months at [facility]. She is  BRCA negative.     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     History of hypertension.      10/6/23 EMB w/ uterine cancer, mesonephric type  10/21/23 Total laparoscopic hysterectomy bilateral salpingo oophorectomy, Bilateral sentinel lymph node injection and mapping, Left pelvic sentinel lymph node dissection and right complete pelvic lymphadenectomy, Cystourethroscopy.  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 MMRp, P53 alison type. TB recommendations for radiation oncology consult, genetics referral, consider systemic therapy  Adjuvant pelvic RT  7/2024 - new pulmonary nodules, all less than 1cm           History of Present Illness:      ID Statement:    JANET MENG is a 67 year old Female        Chief Complaint: follow-up   Interval History:    The patient had  come  for a follow-up on 1/24/23 regarding a recent fall that resulted in broken ribs and prompted imaging studies that revealed pulmonary nodules.  The patient has a history of  T1N0M0 Moderate to fully differentiated infiltrating ductal carcinoma. She remains s/p lumpectomy and axillary lymph node resection. S/p systemic chemotherapy. Was  on Tamoxifen from 10/2007 till 10/2010 then switched to Arimidex 1 mg PO QD.  She reports feeling well and denies any new symptoms other than pain related to her breaking her ribs after a fall.  In  ER CT scan confirmed the fractured ribs but revealed lung nodules which are new.  The patient has occasional cough but denies shortness of breath chest pain hemoptysis.      The patient had come for follow-up on July 26, 2023 regarding history of T1 N0 M0 moderately poorly differentiated infiltrating ductal carcinoma.  S/p lumpectomy axillary lymph node dissection.  S/p systemic chemotherapy.  Received tamoxifen from October 2007 through October 2010 then switched to Arimidex 1 mg p.o. daily doing well denied any symptoms.   The patient had come for follow-up on July 31, 2024 regarding history of T1 N0 M0 moderately poorly differentiated infiltrating ductal carcinoma.  S/p lumpectomy axillary lymph node dissection.  S/p systemic chemotherapy.  Received tamoxifen from October 2007 through October 2010 then switched to Arimidex 1 mg p.o. daily.  The patient presented with postmenopausal bleeding was evaluated further and noted to have endometrial carcinoma mesonephric type.  Underwent DEAN/BSO on October 21, 2023.  Stage Ib, positive LVSI, MMR P, p53 wild-type.  Did receive radiation therapy at [facility].  Was referred for genetic counseling.  CT scan of the chest abdomen pelvis on July 2024 revealed previously existing nodules which are unchanged but new pulmonary nodules.  A mammogram also revealed architectural distortion at 12 o'clock position superior to the nipple.  The  patient had a port during previous evaluation and possible scar.  Past medical history  1. Breast cancer  2. 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 Lumpectomy     Review of Systems:   ·  System Review All other systems have been reviewed and are negative for complaint.      · Constitutional NEGATIVE: Fever, Chills, Anorexia, Weight Loss, Malaise      · Eyes NEGATIVE: Blurry Vision, Drainage, Diploplia, Redness, Vision Loss/ Change      · ENMT NEGATIVE: Nasal Discharge, Nasal Congestion, Ear Pain, Mouth Pain, Throat Pain      · Respiratory NEGATIVE: Dry Cough, Productive Cough, Hemoptysis, Wheezing, Shortness of Breath      · Cardiology NEGATIVE: Chest Pain, Dyspnea on Exertion, Orthopnea, Palpitations, Syncope      · Gastrointestinal NEGATIVE: Abdominal Pain, Constipation, Diarrhea, Nausea, Vomiting      · Genitourinary NEGATIVE: Discharge, Dysuria, Flank Pain, Frequency, Hematuria      · Musculoskeletal NEGATIVE: Decreased ROM, Pain, Swelling, Stiffness, Weakness      · Neurological NEGATIVE: Dizziness, Confusion, Headache, Seizures, Syncope      · Psychiatric NEGATIVE: Mood Changes, Anxiety, Hallucinations, Sleep Changes, Suicidal Ideas      · Skin NEGATIVE: Mass, Pain, Pruritus, Rash, Ulcer      · Endocrine NEGATIVE: Heat Intolerance, Cold Intolerance, Sweat, Polyuria, Thirst      · Hematologic/Lymph NEGATIVE: Anemia, Bruising, Easy Bleeding, Night Sweats, Petechiae      · Allergic/Immunologic NEGATIVE: Anaphylaxis, Itchy/ Teary Eyes, Itching, Sneezing, Swelling      · Breast NEGATIVE: Pain, Mass, Discharge, Nipple Itching, Gynecomastia         Allergies and Intolerances:       Allergies:         No Known Allergies: Active     Outpatient Medication Profile:  * Patient Currently Takes Medications as of 26-Jul-2023 11:01 documented in Structured Notes         cefuroxime 250 mg oral tablet : Last Dose Taken:  , 1 tab(s) orally 2 times a day , Start Date:  26-Oct-2022         gemfibrozil 600 mg oral tablet: Last Dose Taken:  , 1 tab(s) orally 2 times  a day         lisinopril 10 mg oral tablet: Last Dose Taken:  , 1 tab(s) orally once  a day         Daily Multiple Vitamins oral tablet: Last Dose Taken:  , 1 tab(s) orally  once a day         Flonase 50 mcg/inh nasal spray: Last Dose Taken:  , 1 spray(s) nasal  once a day         esomeprazole 40 mg oral delayed release capsule: Last Dose Taken:  ,  1 cap(s) orally once a day             Medical History:         Breast cancer: ICD-10: C50.919, Status: Active       Surg History:         History of lumpectomy: ICD-10: Z98.890, Status: Active     Family History: No Family History items are recorded  in the problem list.      Social History:   Social Substance History:  ·  Social History denies smoking, alcohol and drug use   ·  Smoking Status never smoker   ·  Tobacco Use denies   ·  Alcohol Use denies   ·  Drug Use denies   ·  Additional History      PPD for 30years(1)           Vitals and Measurements:   Vitals: Temp: 36.8  HR: 66  RR: 18  BP: 129/67  SPO2%:   96   Measurements: HT(cm): 156.5  WT(kg): 65.6  BSA: 1.68   BMI:  26.7      Physical Exam:      Constitutional: Well developed, awake/alert/oriented  x3, no distress, alert and cooperative   Eyes: PERRL, EOMI, clear sclera   ENMT: mucous membranes moist, no apparent injury,  no lesions seen   Head/Neck: Neck supple, no apparent injury, thyroid  without mass or tenderness, No JVD, trachea midline, no bruits   Respiratory/Thorax: Patent airways, CTAB, normal  breath sounds with good chest expansion, thorax symmetric   Cardiovascular: Regular, rate and rhythm, no murmurs,  2+ equal pulses of the extremities, normal S 1and S 2   Gastrointestinal: Nondistended, soft, non-tender,  no rebound tenderness or guarding, no masses palpable, no organomegaly, +BS, no bruits   Genitourinary: No Discharge, vesicles or other abnormalities   Musculoskeletal: ROM intact, no joint  swelling, normal  strength   Extremities: normal extremities, no cyanosis edema,  contusions or wounds, no clubbing   Neurological: alert and oriented x3, intact senses,  motor, response and reflexes, normal strength   Breast: No masses, tenderness, no discharge or discoloration   Lymphatic: No significant lymphadenopathy   Psychological: Appropriate mood and behavior   Skin: Warm and dry, no lesions, no rashes         Lab Results:      Radiology Result:     ·  Results     No Results have been selected.  Please select Results from the Available Results list before marking as Reviewed.     Assessment and Plan:       1. T1N0M0 Moderate to fully differentiated infiltrating ductal carcinoma, ER/DC positive HER2 negative  S/p lumpectomy and axillary lymph node resection. S/p systemic chemotherapy. Was on Tamoxifen from 10/2007 till 10/2010 then switched to Arimidex. Original diagnosis in 2007.      The patient had come for a follow up 1/24/23. Physical exam within normal limits. Reviewed lab data with the patient. CBC on 10/05/2022 revealed WBC 6.1, HGB 14.0, HCT 43.2, , Neut 3.71, , K 4.5, , BUN 16, CREAT 0.60. CEA at 1.1 anf  CA 27-29 was at 28.5 on a reference range of 0-38.6. Mammogram on 07/01/2021 revealed benign findings. The patient recently sustained a fall that resulted in broken ribs and prompted imaging studies that revealed pulmonary nodules.  I recommended that the patient undergo a PET scan with tumor markers and reassess..  PET scan was denied by insurance company.  Options include do nothing and repeat CT scans in 3 months or sooner  if symptomatic.  Refer to pulmonary services for consideration of bronchoscopy and biopsy or refer to CT surgery for open biopsy.  After thinking through all the options the patient decided to come after 3 months.  CT scan of chest abdomen pelvis in December 27, 2022 revealed a 3 mm nodule in the right lower lobe which is unchanged.  There are thyroid  nodules.  Overall these are stable findings.  Abdomen and pelvis were negative.  The patient is pleased we will repeat studies in 6 months and if remains stable  will follow clinically after that the patient is agreeable.     The patient had come for follow-up on July 26, 2023 regarding history of T1 N0 M0 infiltrating ductal carcinoma of the left breast s/p lumpectomy radiation therapy axillary lymph node dissection s/p systemic chemotherapy.  Received tamoxifen, October 2000 7 October 2010 then switched to Arimidex 1 mg p.o. daily.  A mammogram on July 21, 2023 revealed benign findings.  CT scan of chest abdomen pelvis done as a follow-up for pulmonary nodules showed mucous plugging in upper lobes and unchanged rest  of the nodules.  No evidence of local or distal recurrence.  The patient is pleased.  Return in 1 year.  The patient had come for follow-up on July 31, 2024 regarding history of T1 N0 M0 moderately poorly differentiated infiltrating ductal carcinoma.  S/p lumpectomy axillary lymph node dissection.  S/p systemic chemotherapy.  Received tamoxifen from October 2007 through October 2010 then switched to Arimidex 1 mg p.o. daily.  The patient presented with postmenopausal bleeding was evaluated further and noted to have endometrial carcinoma mesonephric type.  Underwent DEAN/BSO on October 21, 2023.  Stage Ib, positive LVSI, MMR P, p53 wild-type.  Did receive radiation therapy at [facility].  Was referred for genetic counseling.  CT scan of the chest abdomen pelvis on July 2024 revealed previously existing nodules which are unchanged but new pulmonary nodules.  A mammogram also revealed architectural distortion at 12 o'clock position superior to the nipple.  The patient had a port during previous evaluation and possible scar.  I have recommended ultrasound of left breast.  Return in 4 weeks.  CT scan of chest already scheduled for October 2024.  The patient is agreeable.     2. Lobular carcinoma in-situ  of the left breast in 5/2003     3.  Khan's esophagus on upper endoscopy in September 2021.  Follow-up with GI services.

## 2024-08-02 ENCOUNTER — APPOINTMENT (OUTPATIENT)
Dept: RADIOLOGY | Facility: HOSPITAL | Age: 69
End: 2024-08-02
Payer: MEDICARE

## 2024-08-12 ENCOUNTER — HOSPITAL ENCOUNTER (OUTPATIENT)
Dept: RADIOLOGY | Facility: HOSPITAL | Age: 69
Discharge: HOME | End: 2024-08-12
Payer: MEDICARE

## 2024-08-12 VITALS — BODY MASS INDEX: 24.91 KG/M2 | WEIGHT: 135.36 LBS | HEIGHT: 62 IN

## 2024-08-12 DIAGNOSIS — C54.1 MALIGNANT NEOPLASM OF ENDOMETRIUM (MULTI): ICD-10-CM

## 2024-08-12 DIAGNOSIS — Z17.0 MALIGNANT NEOPLASM OF NIPPLE OF RIGHT BREAST IN FEMALE, ESTROGEN RECEPTOR POSITIVE (MULTI): Primary | ICD-10-CM

## 2024-08-12 DIAGNOSIS — C50.919 MALIGNANT NEOPLASM OF FEMALE BREAST, UNSPECIFIED ESTROGEN RECEPTOR STATUS, UNSPECIFIED LATERALITY, UNSPECIFIED SITE OF BREAST (MULTI): ICD-10-CM

## 2024-08-12 DIAGNOSIS — D69.6 THROMBOCYTOPENIA (CMS-HCC): ICD-10-CM

## 2024-08-12 DIAGNOSIS — C50.011 MALIGNANT NEOPLASM OF NIPPLE OF RIGHT BREAST IN FEMALE, ESTROGEN RECEPTOR POSITIVE (MULTI): Primary | ICD-10-CM

## 2024-08-12 DIAGNOSIS — Z17.0 MALIGNANT NEOPLASM INVOLVING BOTH NIPPLE AND AREOLA OF RIGHT BREAST IN FEMALE, ESTROGEN RECEPTOR POSITIVE (MULTI): Primary | ICD-10-CM

## 2024-08-12 DIAGNOSIS — Z17.0 MALIGNANT NEOPLASM INVOLVING BOTH NIPPLE AND AREOLA OF RIGHT BREAST IN FEMALE, ESTROGEN RECEPTOR POSITIVE (MULTI): ICD-10-CM

## 2024-08-12 DIAGNOSIS — C50.011 MALIGNANT NEOPLASM INVOLVING BOTH NIPPLE AND AREOLA OF RIGHT BREAST IN FEMALE, ESTROGEN RECEPTOR POSITIVE (MULTI): Primary | ICD-10-CM

## 2024-08-12 DIAGNOSIS — C50.011 MALIGNANT NEOPLASM INVOLVING BOTH NIPPLE AND AREOLA OF RIGHT BREAST IN FEMALE, ESTROGEN RECEPTOR POSITIVE (MULTI): ICD-10-CM

## 2024-08-12 DIAGNOSIS — R92.8 ABNORMAL MAMMOGRAM: ICD-10-CM

## 2024-08-12 PROCEDURE — 77061 BREAST TOMOSYNTHESIS UNI: CPT | Mod: LT

## 2024-08-12 PROCEDURE — G0279 TOMOSYNTHESIS, MAMMO: HCPCS | Mod: LEFT SIDE | Performed by: RADIOLOGY

## 2024-08-12 PROCEDURE — 77065 DX MAMMO INCL CAD UNI: CPT | Mod: LEFT SIDE | Performed by: RADIOLOGY

## 2024-08-27 ENCOUNTER — APPOINTMENT (OUTPATIENT)
Dept: HEMATOLOGY/ONCOLOGY | Facility: CLINIC | Age: 69
End: 2024-08-27
Payer: MEDICARE

## 2024-10-02 ENCOUNTER — LAB (OUTPATIENT)
Dept: LAB | Facility: LAB | Age: 69
End: 2024-10-02
Payer: MEDICARE

## 2024-10-02 DIAGNOSIS — C50.919 MALIGNANT NEOPLASM OF FEMALE BREAST, UNSPECIFIED ESTROGEN RECEPTOR STATUS, UNSPECIFIED LATERALITY, UNSPECIFIED SITE OF BREAST: ICD-10-CM

## 2024-10-02 DIAGNOSIS — C54.1 MALIGNANT NEOPLASM OF ENDOMETRIUM (MULTI): ICD-10-CM

## 2024-10-02 DIAGNOSIS — E78.00 PURE HYPERCHOLESTEROLEMIA, UNSPECIFIED: Primary | ICD-10-CM

## 2024-10-02 LAB
ALBUMIN SERPL BCP-MCNC: 4.5 G/DL (ref 3.4–5)
ALP SERPL-CCNC: 81 U/L (ref 33–136)
ALT SERPL W P-5'-P-CCNC: 10 U/L (ref 7–45)
ANION GAP SERPL CALC-SCNC: 11 MMOL/L (ref 10–20)
AST SERPL W P-5'-P-CCNC: 14 U/L (ref 9–39)
BASOPHILS # BLD AUTO: 0.02 X10*3/UL (ref 0–0.1)
BASOPHILS NFR BLD AUTO: 0.6 %
BILIRUB SERPL-MCNC: 0.5 MG/DL (ref 0–1.2)
BUN SERPL-MCNC: 22 MG/DL (ref 6–23)
CALCIUM SERPL-MCNC: 9.5 MG/DL (ref 8.6–10.6)
CANCER AG27-29 SERPL-ACNC: 33 U/ML (ref 0–38.6)
CEA SERPL-MCNC: 1.5 UG/L
CHLORIDE SERPL-SCNC: 102 MMOL/L (ref 98–107)
CHOLEST SERPL-MCNC: 203 MG/DL (ref 0–199)
CHOLESTEROL/HDL RATIO: 3.9
CO2 SERPL-SCNC: 29 MMOL/L (ref 21–32)
CREAT SERPL-MCNC: 0.66 MG/DL (ref 0.5–1.05)
EGFRCR SERPLBLD CKD-EPI 2021: >90 ML/MIN/1.73M*2
EOSINOPHIL # BLD AUTO: 0.17 X10*3/UL (ref 0–0.7)
EOSINOPHIL NFR BLD AUTO: 4.8 %
ERYTHROCYTE [DISTWIDTH] IN BLOOD BY AUTOMATED COUNT: 12.3 % (ref 11.5–14.5)
GLUCOSE SERPL-MCNC: 86 MG/DL (ref 74–99)
HCT VFR BLD AUTO: 42.3 % (ref 36–46)
HDLC SERPL-MCNC: 52.7 MG/DL
HGB BLD-MCNC: 14 G/DL (ref 12–16)
IMM GRANULOCYTES # BLD AUTO: 0.01 X10*3/UL (ref 0–0.7)
IMM GRANULOCYTES NFR BLD AUTO: 0.3 % (ref 0–0.9)
LDLC SERPL CALC-MCNC: 131 MG/DL
LYMPHOCYTES # BLD AUTO: 1.04 X10*3/UL (ref 1.2–4.8)
LYMPHOCYTES NFR BLD AUTO: 29.2 %
MCH RBC QN AUTO: 29.6 PG (ref 26–34)
MCHC RBC AUTO-ENTMCNC: 33.1 G/DL (ref 32–36)
MCV RBC AUTO: 89 FL (ref 80–100)
MONOCYTES # BLD AUTO: 0.32 X10*3/UL (ref 0.1–1)
MONOCYTES NFR BLD AUTO: 9 %
NEUTROPHILS # BLD AUTO: 2 X10*3/UL (ref 1.2–7.7)
NEUTROPHILS NFR BLD AUTO: 56.1 %
NON HDL CHOLESTEROL: 150 MG/DL (ref 0–149)
NRBC BLD-RTO: 0 /100 WBCS (ref 0–0)
PLATELET # BLD AUTO: 306 X10*3/UL (ref 150–450)
POTASSIUM SERPL-SCNC: 4.1 MMOL/L (ref 3.5–5.3)
PROT SERPL-MCNC: 6.7 G/DL (ref 6.4–8.2)
RBC # BLD AUTO: 4.73 X10*6/UL (ref 4–5.2)
SODIUM SERPL-SCNC: 138 MMOL/L (ref 136–145)
TRIGL SERPL-MCNC: 97 MG/DL (ref 0–149)
VLDL: 19 MG/DL (ref 0–40)
WBC # BLD AUTO: 3.6 X10*3/UL (ref 4.4–11.3)

## 2024-10-02 PROCEDURE — 86300 IMMUNOASSAY TUMOR CA 15-3: CPT

## 2024-10-02 PROCEDURE — 82378 CARCINOEMBRYONIC ANTIGEN: CPT

## 2024-10-02 PROCEDURE — 80053 COMPREHEN METABOLIC PANEL: CPT

## 2024-10-02 PROCEDURE — 36415 COLL VENOUS BLD VENIPUNCTURE: CPT

## 2024-10-02 PROCEDURE — 80061 LIPID PANEL: CPT

## 2024-10-02 PROCEDURE — 85025 COMPLETE CBC W/AUTO DIFF WBC: CPT

## 2024-10-09 ENCOUNTER — HOSPITAL ENCOUNTER (OUTPATIENT)
Dept: RADIOLOGY | Facility: CLINIC | Age: 69
Discharge: HOME | End: 2024-10-09
Payer: MEDICARE

## 2024-10-09 DIAGNOSIS — C54.1 MALIGNANT NEOPLASM OF ENDOMETRIUM (MULTI): ICD-10-CM

## 2024-10-09 PROCEDURE — 71260 CT THORAX DX C+: CPT

## 2024-10-09 PROCEDURE — 2550000001 HC RX 255 CONTRASTS: Performed by: STUDENT IN AN ORGANIZED HEALTH CARE EDUCATION/TRAINING PROGRAM

## 2024-10-14 ENCOUNTER — APPOINTMENT (OUTPATIENT)
Dept: RADIATION ONCOLOGY | Facility: HOSPITAL | Age: 69
End: 2024-10-14
Payer: MEDICARE

## 2024-10-15 ENCOUNTER — OFFICE VISIT (OUTPATIENT)
Dept: HEMATOLOGY/ONCOLOGY | Facility: CLINIC | Age: 69
End: 2024-10-15
Payer: MEDICARE

## 2024-10-15 VITALS
DIASTOLIC BLOOD PRESSURE: 79 MMHG | OXYGEN SATURATION: 97 % | HEART RATE: 62 BPM | SYSTOLIC BLOOD PRESSURE: 149 MMHG | TEMPERATURE: 97.2 F | BODY MASS INDEX: 24.59 KG/M2 | RESPIRATION RATE: 18 BRPM | WEIGHT: 134.48 LBS

## 2024-10-15 DIAGNOSIS — C50.919 MALIGNANT NEOPLASM OF FEMALE BREAST, UNSPECIFIED ESTROGEN RECEPTOR STATUS, UNSPECIFIED LATERALITY, UNSPECIFIED SITE OF BREAST: ICD-10-CM

## 2024-10-15 DIAGNOSIS — Z51.11 CHEMOTHERAPY MANAGEMENT, ENCOUNTER FOR: Primary | ICD-10-CM

## 2024-10-15 DIAGNOSIS — D69.6 THROMBOCYTOPENIA (CMS-HCC): ICD-10-CM

## 2024-10-15 DIAGNOSIS — C54.1 MALIGNANT NEOPLASM OF ENDOMETRIUM (MULTI): ICD-10-CM

## 2024-10-15 PROCEDURE — 99215 OFFICE O/P EST HI 40 MIN: CPT | Performed by: INTERNAL MEDICINE

## 2024-10-15 ASSESSMENT — PAIN SCALES - GENERAL: PAINLEVEL: 0-NO PAIN

## 2024-10-15 NOTE — PROGRESS NOTES
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 Intermediate score on Oncotype DX. Systemic adjuvant chemotherapy. She was on tamoxifen from October 2007 in October 2000 and and switched to Arimidex original diagnosis in May 2007.     History of lobular carcinoma in situ of left breast in May 2003.     The patient's 32-year-old daughter was diagnosed as having ER, SD, HER negative breast cancer in July 2016. She received neoadjuvant chemotherapy with CA, followed by Taxol. She was placed on maintenance Xeloda for 6 months at [facility]. She is  BRCA negative.     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     History of hypertension.      10/6/23 EMB w/ uterine cancer, mesonephric type  10/21/23 Total laparoscopic hysterectomy bilateral salpingo oophorectomy, Bilateral sentinel lymph node injection and mapping, Left pelvic sentinel lymph node dissection and right complete pelvic lymphadenectomy, Cystourethroscopy.  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 MMRp, P53 alison type. TB recommendations for radiation oncology consult, genetics referral, consider systemic therapy  Adjuvant pelvic RT  7/2024 - new pulmonary nodules, all less than 1cm           History of Present Illness:      ID Statement:    JANET MENG is a 67 year old Female        Chief Complaint: follow-up   Interval History:    The patient had  come  for a follow-up on 1/24/23 regarding a recent fall that resulted in broken ribs and prompted imaging studies that revealed pulmonary nodules.  The patient has a history of  T1N0M0 Moderate to fully differentiated infiltrating ductal carcinoma. She remains s/p lumpectomy and axillary lymph node resection. S/p systemic chemotherapy. Was  on Tamoxifen from 10/2007 till 10/2010 then switched to Arimidex 1 mg PO QD.  She reports feeling well and denies any new symptoms other than pain related to her breaking her ribs after a fall.  In  ER CT scan confirmed the fractured ribs but revealed lung nodules which are new.  The patient has occasional cough but denies shortness of breath chest pain hemoptysis.      The patient had come for follow-up on July 26, 2023 regarding history of T1 N0 M0 moderately poorly differentiated infiltrating ductal carcinoma.  S/p lumpectomy axillary lymph node dissection.  S/p systemic chemotherapy.  Received tamoxifen from October 2007 through October 2010 then switched to Arimidex 1 mg p.o. daily doing well denied any symptoms.   The patient had come for follow-up on October 15, 2024 regarding history of T1 N0 M0 moderately poorly differentiated infiltrating ductal carcinoma.  S/p lumpectomy axillary lymph node dissection.  S/p systemic chemotherapy.  Received tamoxifen from October 2007 through October 2010 then switched to Arimidex 1 mg p.o. daily.  The patient presented with postmenopausal bleeding was evaluated further and noted to have endometrial carcinoma mesonephric type.  Underwent DEAN/BSO on October 21, 2023.  Stage Ib, positive LVSI, MMR P, p53 wild-type.  Did receive radiation therapy at [facility].  Was referred for genetic counseling.  CT scan of the chest abdomen pelvis on July 2024 revealed previously existing nodules which are unchanged but new pulmonary nodules.  A mammogram also revealed architectural distortion at 12 o'clock position superior to the nipple.  The  patient had a port during previous evaluation and possible scar.  Past medical history  1. Breast cancer  2. 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 Lumpectomy     Review of Systems:   ·  System Review All other systems have been reviewed and are negative for complaint.      · Constitutional NEGATIVE: Fever, Chills, Anorexia, Weight Loss, Malaise      · Eyes NEGATIVE: Blurry Vision, Drainage, Diploplia, Redness, Vision Loss/ Change      · ENMT NEGATIVE: Nasal Discharge, Nasal Congestion, Ear Pain, Mouth Pain, Throat Pain      · Respiratory NEGATIVE: Dry Cough, Productive Cough, Hemoptysis, Wheezing, Shortness of Breath      · Cardiology NEGATIVE: Chest Pain, Dyspnea on Exertion, Orthopnea, Palpitations, Syncope      · Gastrointestinal NEGATIVE: Abdominal Pain, Constipation, Diarrhea, Nausea, Vomiting      · Genitourinary NEGATIVE: Discharge, Dysuria, Flank Pain, Frequency, Hematuria      · Musculoskeletal NEGATIVE: Decreased ROM, Pain, Swelling, Stiffness, Weakness      · Neurological NEGATIVE: Dizziness, Confusion, Headache, Seizures, Syncope      · Psychiatric NEGATIVE: Mood Changes, Anxiety, Hallucinations, Sleep Changes, Suicidal Ideas      · Skin NEGATIVE: Mass, Pain, Pruritus, Rash, Ulcer      · Endocrine NEGATIVE: Heat Intolerance, Cold Intolerance, Sweat, Polyuria, Thirst      · Hematologic/Lymph NEGATIVE: Anemia, Bruising, Easy Bleeding, Night Sweats, Petechiae      · Allergic/Immunologic NEGATIVE: Anaphylaxis, Itchy/ Teary Eyes, Itching, Sneezing, Swelling      · Breast NEGATIVE: Pain, Mass, Discharge, Nipple Itching, Gynecomastia         Allergies and Intolerances:       Allergies:         No Known Allergies: Active     Outpatient Medication Profile:  * Patient Currently Takes Medications as of 26-Jul-2023 11:01 documented in Structured Notes         cefuroxime 250 mg oral tablet : Last Dose Taken:  , 1 tab(s) orally 2 times a day , Start Date:  26-Oct-2022         gemfibrozil 600 mg oral tablet: Last Dose Taken:  , 1 tab(s) orally 2 times  a day         lisinopril 10 mg oral tablet: Last Dose Taken:  , 1 tab(s) orally once  a day         Daily Multiple Vitamins oral tablet: Last Dose Taken:  , 1 tab(s) orally  once a day         Flonase 50 mcg/inh nasal spray: Last Dose Taken:  , 1 spray(s) nasal  once a day         esomeprazole 40 mg oral delayed release capsule: Last Dose Taken:  ,  1 cap(s) orally once a day             Medical History:         Breast cancer: ICD-10: C50.919, Status: Active       Surg History:         History of lumpectomy: ICD-10: Z98.890, Status: Active     Family History: No Family History items are recorded  in the problem list.      Social History:   Social Substance History:  ·  Social History denies smoking, alcohol and drug use   ·  Smoking Status never smoker   ·  Tobacco Use denies   ·  Alcohol Use denies   ·  Drug Use denies   ·  Additional History      PPD for 30years(1)           Vitals and Measurements:   Vitals: Temp: 36.8  HR: 66  RR: 18  BP: 129/67  SPO2%:   96   Measurements: HT(cm): 156.5  WT(kg): 65.6  BSA: 1.68   BMI:  26.7      Physical Exam:      Constitutional: Well developed, awake/alert/oriented  x3, no distress, alert and cooperative   Eyes: PERRL, EOMI, clear sclera   ENMT: mucous membranes moist, no apparent injury,  no lesions seen   Head/Neck: Neck supple, no apparent injury, thyroid  without mass or tenderness, No JVD, trachea midline, no bruits   Respiratory/Thorax: Patent airways, CTAB, normal  breath sounds with good chest expansion, thorax symmetric   Cardiovascular: Regular, rate and rhythm, no murmurs,  2+ equal pulses of the extremities, normal S 1and S 2   Gastrointestinal: Nondistended, soft, non-tender,  no rebound tenderness or guarding, no masses palpable, no organomegaly, +BS, no bruits   Genitourinary: No Discharge, vesicles or other abnormalities   Musculoskeletal: ROM intact, no joint  swelling, normal  strength   Extremities: normal extremities, no cyanosis edema,  contusions or wounds, no clubbing   Neurological: alert and oriented x3, intact senses,  motor, response and reflexes, normal strength   Breast: No masses, tenderness, no discharge or discoloration   Lymphatic: No significant lymphadenopathy   Psychological: Appropriate mood and behavior   Skin: Warm and dry, no lesions, no rashes         Lab Results:      Radiology Result:     ·  Results     No Results have been selected.  Please select Results from the Available Results list before marking as Reviewed.     Assessment and Plan:       1. T1N0M0 Moderate to fully differentiated infiltrating ductal carcinoma, ER/[place] positive HER2 negative  S/p lumpectomy and axillary lymph node resection. S/p systemic chemotherapy. Was on Tamoxifen from 10/2007 till 10/2010 then switched to Arimidex. Original diagnosis in 2007.      The patient had come for a follow up 1/24/23. Physical exam within normal limits. Reviewed lab data with the patient. CBC on 10/05/2022 revealed WBC 6.1, HGB 14.0, HCT 43.2, , Neut 3.71, , K 4.5, , BUN 16, CREAT 0.60. CEA at 1.1 anf  CA 27-29 was at 28.5 on a reference range of 0-38.6. Mammogram on 07/01/2021 revealed benign findings. The patient recently sustained a fall that resulted in broken ribs and prompted imaging studies that revealed pulmonary nodules.  I recommended that the patient undergo a PET scan with tumor markers and reassess..  PET scan was denied by insurance company.  Options include do nothing and repeat CT scans in 3 months or sooner  if symptomatic.  Refer to pulmonary services for consideration of bronchoscopy and biopsy or refer to CT surgery for open biopsy.  After thinking through all the options the patient decided to come after 3 months.  CT scan of chest abdomen pelvis in December 27, 2022 revealed a 3 mm nodule in the right lower lobe which is unchanged.  There are thyroid  nodules.  Overall these are stable findings.  Abdomen and pelvis were negative.  The patient is pleased we will repeat studies in 6 months and if remains stable  will follow clinically after that the patient is agreeable.     The patient had come for follow-up on July 26, 2023 regarding history of T1 N0 M0 infiltrating ductal carcinoma of the left breast s/p lumpectomy radiation therapy axillary lymph node dissection s/p systemic chemotherapy.  Received tamoxifen, October 2000 7 October 2010 then switched to Arimidex 1 mg p.o. daily.  A mammogram on July 21, 2023 revealed benign findings.  CT scan of chest abdomen pelvis done as a follow-up for pulmonary nodules showed mucous plugging in upper lobes and unchanged rest  of the nodules.  No evidence of local or distal recurrence.  The patient is pleased.  Return in 1 year.    The patient had come for follow-up on October 15, 2024  regarding history of T1 N0 M0 moderately poorly differentiated infiltrating ductal carcinoma.  S/p lumpectomy axillary lymph node dissection.  S/p systemic chemotherapy.  Received tamoxifen from October 2007 through October 2010 then switched to Arimidex 1 mg p.o. daily.  The patient presented with postmenopausal bleeding was evaluated further and noted to have endometrial carcinoma mesonephric type.  Underwent DEAN/BSO on October 21, 2023.  Stage Ib, positive LVSI, MMR P, p53 wild-type.  Did receive radiation therapy at [facility].  Was referred for genetic counseling.  CT scan of the chest abdomen pelvis on July 2024 revealed previously existing nodules which are unchanged but new pulmonary nodules.  A mammogram also revealed architectural distortion at 12 o'clock position superior to the nipple.  The patient had a port during previous evaluation and possible scar.  I have recommended ultrasound of left breast.  Return in 4 weeks.  CT scan of chest on October 14, 2024 revealedBilateral multiple solid pulmonary parenchymal nodules, many  of  which have increased in size from 07/01/2024.  2. No mediastinal lymphadenopathy. No pleural effusion..  I have recommended a PET scan as well as a CT-guided biopsy of the most accessible lung nodule which is increasing in size.  Return in 3 weeks.     2. 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  Khan's esophagus on upper endoscopy in September 2021.  Follow-up with GI services.

## 2024-10-17 NOTE — TUMOR BOARD NOTE
Gynecologic Oncology Tumor Board 10/18/2024         Mica Vanessa  69 y.o.    1955  MRN [id number]    Provider: Nannette Palacio MD  Disease Site/Stage: Uterine  Pathology: Stage IB Mesonephric-like adenocarcinoma of uterus   Prior Therapy:  Surgery, pelvic RT  -prior history of breast cancer requiring chemotherapy and radiation  Impression: h/o stage IB mesonephric-like adenocarcinoma of the uterus and a history of breast cancer with numerous indetermine pulmonary nodules    We reviewed previous medical and familial history, history of present illness, and recent lab results along with all available histopathologic and imaging studies. The tumor board considered available treatment options and made the following recommendations.    Recommendations:   repeat imaging in 3-4 months, genetics referral      Clinical Trial Consideration: None Available    National site-specific guidelines were discussed with respect to the case. It is recognized that there may be additional factors not discussed in the Review Board discussion that can influence management decisions, and alternative management options which fall within the standard of care. Accordingly the final treatment disposition will be determined by the patient, in the context of an informed discussion with their providers. The actual prescribed management or treatment plan may or may not be consistent with the Review Board recommendations.

## 2024-10-18 ENCOUNTER — TUMOR BOARD CONFERENCE (OUTPATIENT)
Dept: HEMATOLOGY/ONCOLOGY | Facility: HOSPITAL | Age: 69
End: 2024-10-18

## 2024-10-21 ENCOUNTER — OFFICE VISIT (OUTPATIENT)
Dept: GYNECOLOGIC ONCOLOGY | Facility: CLINIC | Age: 69
End: 2024-10-21
Payer: MEDICARE

## 2024-10-21 VITALS
TEMPERATURE: 99 F | RESPIRATION RATE: 16 BRPM | DIASTOLIC BLOOD PRESSURE: 67 MMHG | BODY MASS INDEX: 24.11 KG/M2 | HEART RATE: 88 BPM | SYSTOLIC BLOOD PRESSURE: 130 MMHG | OXYGEN SATURATION: 94 % | WEIGHT: 131.84 LBS

## 2024-10-21 DIAGNOSIS — C54.1 MALIGNANT NEOPLASM OF ENDOMETRIUM (MULTI): Primary | ICD-10-CM

## 2024-10-21 PROCEDURE — 3078F DIAST BP <80 MM HG: CPT | Performed by: OBSTETRICS & GYNECOLOGY

## 2024-10-21 PROCEDURE — 1036F TOBACCO NON-USER: CPT | Performed by: OBSTETRICS & GYNECOLOGY

## 2024-10-21 PROCEDURE — 1159F MED LIST DOCD IN RCRD: CPT | Performed by: OBSTETRICS & GYNECOLOGY

## 2024-10-21 PROCEDURE — 1126F AMNT PAIN NOTED NONE PRSNT: CPT | Performed by: OBSTETRICS & GYNECOLOGY

## 2024-10-21 PROCEDURE — 99213 OFFICE O/P EST LOW 20 MIN: CPT | Performed by: OBSTETRICS & GYNECOLOGY

## 2024-10-21 PROCEDURE — 3075F SYST BP GE 130 - 139MM HG: CPT | Performed by: OBSTETRICS & GYNECOLOGY

## 2024-10-21 RX ORDER — BUSPIRONE HYDROCHLORIDE 5 MG/1
5 TABLET ORAL 2 TIMES DAILY
COMMUNITY
Start: 2024-09-18

## 2024-10-21 ASSESSMENT — PAIN SCALES - GENERAL: PAINLEVEL_OUTOF10: 0-NO PAIN

## 2024-10-21 NOTE — PROGRESS NOTES
Patient ID: Mica Vanessa is a 69 y.o. female.  Referring Physician: No referring provider defined for this encounter.  Primary Care Provider: Nurys Hector MD    Subjective    Feeling ok    Worried about lung nodules seen on CT    No bleeding  No changes to bowel or bladder habits          Objective    BSA: 1.62 meters squared  /67 (BP Location: Right arm, Patient Position: Sitting, BP Cuff Size: Adult)   Pulse 88   Temp 37.2 °C (99 °F) (Temporal)   Resp 16   Wt 59.8 kg (131 lb 13.4 oz)   SpO2 94%   BMI 24.11 kg/m²      Physical Exam  Constitutional:       Appearance: Normal appearance.   Cardiovascular:      Rate and Rhythm: Normal rate.      Heart sounds: Normal heart sounds.   Pulmonary:      Effort: Pulmonary effort is normal.      Breath sounds: Normal breath sounds.   Abdominal:      General: Abdomen is flat. There is no distension.      Palpations: There is no mass.      Tenderness: There is no abdominal tenderness.   Genitourinary:     General: Normal vulva.      Exam position: Lithotomy position.      Uterus: Absent.       Comments: Cervix and uterine surgically absent.  No lesions involving the vaginal cuff.  No masses on bimanual or rectovaginal exam  Musculoskeletal:      Cervical back: Normal range of motion.   Skin:     General: Skin is warm and dry.   Neurological:      Mental Status: She is alert.   Psychiatric:         Mood and Affect: Mood normal.         Behavior: Behavior normal.         Performance Status:  Asymptomatic    Assessment/Plan     Oncology History Overview Note   Cancer history:  10/6/23 EMB w/ uterine cancer, mesonephric type  10/21/23 Total laparoscopic hysterectomy bilateral salpingo oophorectomy, Bilateral sentinel lymph node injection and mapping, Left pelvic sentinel lymph node dissection and right complete pelvic lymphadenectomy, Cystourethroscopy.  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 MMRp, P53 alison type. TB  recommendations for radiation oncology consult, genetics referral, consider systemic therapy  Adjuvant pelvic RT  7/2024 - new pulmonary nodules, all less than 1cm  10/2024 CT chest stable     Malignant neoplasm of endometrium (Multi)   11/10/2023 Initial Diagnosis    Malignant neoplasm of endometrium (Multi)          Problem List Items Addressed This Visit             ICD-10-CM    Malignant neoplasm of endometrium (Multi) - Primary C54.1       Treatment Plans       No treatment plans exist        Doing well  Discussed tumor board recommendations/impression.  Discussed that lung nodules may be too small for biopsy.  Will follow up PET/CT results.    Follow up 4 months for surveillance unless sooner follow up is indicated based on upcoming testing.

## 2024-10-22 ENCOUNTER — TELEPHONE (OUTPATIENT)
Dept: RADIATION ONCOLOGY | Facility: HOSPITAL | Age: 69
End: 2024-10-22
Payer: MEDICARE

## 2024-10-22 ENCOUNTER — TELEPHONE (OUTPATIENT)
Dept: HEMATOLOGY/ONCOLOGY | Facility: CLINIC | Age: 69
End: 2024-10-22
Payer: MEDICARE

## 2024-10-22 DIAGNOSIS — J06.9 UPPER RESPIRATORY TRACT INFECTION, UNSPECIFIED TYPE: Primary | ICD-10-CM

## 2024-10-22 RX ORDER — CEFUROXIME AXETIL 250 MG/1
250 TABLET ORAL 2 TIMES DAILY
Qty: 20 TABLET | Refills: 0 | Status: SHIPPED | OUTPATIENT
Start: 2024-10-22 | End: 2024-11-01

## 2024-10-22 NOTE — TELEPHONE ENCOUNTER
10/23/2024 at 3:00 Pt answered and will be present.     please call Lur[facility] on emergency contact. family requesting update

## 2024-10-22 NOTE — TELEPHONE ENCOUNTER
Pt called-States she has been sick with a sinus infection that is now in her chest since 10/17/24. She has a PET scan scheduled on 10/25/24 for lung nodules. She is not currently on antibiotic for illness. Asking if her being sick will affect the results of the PET scan. She also mentioned that her WBC was low on her last blood draw on 10/2/24. Please call, thanks.

## 2024-10-23 ENCOUNTER — HOSPITAL ENCOUNTER (OUTPATIENT)
Dept: RADIATION ONCOLOGY | Facility: HOSPITAL | Age: 69
Setting detail: RADIATION/ONCOLOGY SERIES
Discharge: HOME | End: 2024-10-23
Payer: MEDICARE

## 2024-10-23 VITALS
TEMPERATURE: 97.2 F | BODY MASS INDEX: 24.37 KG/M2 | SYSTOLIC BLOOD PRESSURE: 115 MMHG | RESPIRATION RATE: 18 BRPM | DIASTOLIC BLOOD PRESSURE: 76 MMHG | OXYGEN SATURATION: 96 % | HEART RATE: 101 BPM | WEIGHT: 133.3 LBS

## 2024-10-23 DIAGNOSIS — C54.1 MALIGNANT NEOPLASM OF ENDOMETRIUM (MULTI): Primary | ICD-10-CM

## 2024-10-23 PROCEDURE — G2211 COMPLEX E/M VISIT ADD ON: HCPCS | Performed by: NURSE PRACTITIONER

## 2024-10-23 PROCEDURE — 99214 OFFICE O/P EST MOD 30 MIN: CPT | Performed by: NURSE PRACTITIONER

## 2024-10-23 ASSESSMENT — ENCOUNTER SYMPTOMS
HEMATURIA: 0
LOSS OF SENSATION IN FEET: 0
ACTIVITY CHANGE: 0
DIFFICULTY URINATING: 0
DYSURIA: 0
FLANK PAIN: 0
FEVER: 0
ABDOMINAL PAIN: 0
DEPRESSION: 0
COUGH: 1
PSYCHIATRIC NEGATIVE: 1
FREQUENCY: 0
APPETITE CHANGE: 0
DIARRHEA: 0
ANAL BLEEDING: 0
CARDIOVASCULAR NEGATIVE: 1
DIAPHORESIS: 0
HEMATOLOGIC/LYMPHATIC NEGATIVE: 1
ABDOMINAL DISTENTION: 0
FATIGUE: 0
NAUSEA: 0
RECTAL PAIN: 0
OCCASIONAL FEELINGS OF UNSTEADINESS: 0
BLOOD IN STOOL: 0
CHILLS: 0
CONSTIPATION: 0
MUSCULOSKELETAL NEGATIVE: 1
UNEXPECTED WEIGHT CHANGE: 0
NEUROLOGICAL NEGATIVE: 1

## 2024-10-23 ASSESSMENT — COLUMBIA-SUICIDE SEVERITY RATING SCALE - C-SSRS
2. HAVE YOU ACTUALLY HAD ANY THOUGHTS OF KILLING YOURSELF?: NO
1. IN THE PAST MONTH, HAVE YOU WISHED YOU WERE DEAD OR WISHED YOU COULD GO TO SLEEP AND NOT WAKE UP?: NO
6. HAVE YOU EVER DONE ANYTHING, STARTED TO DO ANYTHING, OR PREPARED TO DO ANYTHING TO END YOUR LIFE?: NO

## 2024-10-23 ASSESSMENT — PAIN SCALES - GENERAL: PAINLEVEL_OUTOF10: 0-NO PAIN

## 2024-10-23 NOTE — PROGRESS NOTES
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 Intermediate score on Oncotype DX. Systemic adjuvant chemotherapy, Radiation Therapy (Josefina) tamoxifen then arimidex (2007)  Stage IB pT1B pN0 cM0 endometrial adenocarcinoma, mesonephric like type, MI 10/12 mm, +LVSI (extensive), margins negative, 0/3 LN, MMR intact, p53 wt, s/p LSC HystBSO  (2023)     Cancer History for endometrial cancer:   07/19/2023 CT c/a/p: reproductive organs \"unremarkable\"  10/06/2023 Endometrial polypectomy: endometrial adenocarcinoma, mesonephric like type, MMR intact, p53 wt  12/21/2023 LSC-HBSO:  mesonephric-like adenocarcinoma, g1, MI 10/12 mm, +LVSI (extensive), margins negative, 0/3 LN    Pelvic radiation completed 3/18/24    History of presenting illness    Mica Vanessa is a 69 y.o. female who presents today for follow up 7 months s/p pelvic radiation for endometrial cancer. Patient is doing well overall. She was treated with Paxlovid in August and currently on antibiotics for URI. 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 Energy baseline. Appetite good. Weight stable. Denies pelvic pain, vaginal bleeding or discharge. Denies hematuria and dysuria. Endorses leakage. This remains unchanged from prior to treatment. She does Kegel exercises. Bowels baseline. No rectal bleeding. She had exam with Dr. Palacio 2 days ago UTE. Patient is not sexually active using dilators sporadically.  Patient follows with Dr. Plata for history of breast cancer. No breast complaints. Mammogram from August showed no evidence of malignancy. There was questionable distortion of left breast. Per patient they forgot " to jose port site. Dr. Plata ordered US for further evaluation. She has not had done yet. CT chest was done on 10/9/24 that showed Bilateral multiple solid pulmonary parenchymal nodules, many of which have increased in size from 07/01/2024. The largest being 4.9mm. No mediastinal lymphadenopathy. No pleural effusion. Patient was presented to Gyn tumor board. Recs were reimaging in 3-4 months and referral to genetics. She saw Dr. Plata prior to tumor board presentation and PET CT ordered and IR referral placed. Denies SOB, chest pain, back pain or fevers. Endorses cough that began with URI. Follow up with Dr. Plata in 3 weeks. Follow up with Dr. Palacio in April. Patient expressing concerns over increasing [facility] bill despite being on payment plan and co pay with each visit to her oncology providers.     Review of systems:  Review of Systems   Constitutional:  Negative for activity change, appetite change, chills, diaphoresis, fatigue, fever and unexpected weight change.   HENT: Negative.     Respiratory:  Positive for cough.    Cardiovascular: Negative.    Gastrointestinal:  Negative for abdominal distention, abdominal pain, anal bleeding, blood in stool, constipation, diarrhea, nausea and rectal pain.   Genitourinary:  Positive for enuresis. Negative for decreased urine volume, difficulty urinating, dysuria, flank pain, frequency, hematuria, pelvic pain, urgency, vaginal bleeding, vaginal discharge and vaginal pain.   Musculoskeletal: Negative.    Skin: Negative.    Neurological: Negative.    Hematological: Negative.    Psychiatric/Behavioral: Negative.         Past Medical history  She  has a past surgical history that includes Other surgical history (11/23/2022); Other surgical history (11/23/2022); Other surgical history (11/23/2022); Lumbar peritoneal shunt; Tubal ligation; Skin cancer excision (2002); Breast lumpectomy; and Breast biopsy.      Last recorded vital:  /76   Pulse 101   Temp 36.2 °C (97.2  °F) (Skin)   Resp 18   Wt 60.5 kg (133 lb 4.8 oz)   SpO2 96%   BMI 24.37 kg/m²     Physical exam  Physical Exam  Constitutional:       General: She is not in acute distress.     Appearance: Normal appearance. She is not ill-appearing, toxic-appearing or diaphoretic.   HENT:      Head: Normocephalic.   Cardiovascular:      Rate and Rhythm: Normal rate and regular rhythm.      Pulses: Normal pulses.      Heart sounds: Normal heart sounds.   Pulmonary:      Effort: Pulmonary effort is normal.      Breath sounds: Normal breath sounds.   Abdominal:      General: Bowel sounds are normal. There is no distension.      Palpations: Abdomen is soft. There is no mass.      Tenderness: There is no abdominal tenderness. There is no guarding or rebound.      Hernia: No hernia is present.   Genitourinary:     Comments: Exam deferred. UTE on exam 10/21/24 with Gyn Onc.     Musculoskeletal:         General: Normal range of motion.      Cervical back: Normal range of motion and neck supple.   Skin:     General: Skin is warm and dry.   Neurological:      General: No focal deficit present.      Mental Status: She is alert and oriented to person, place, and time.   Psychiatric:         Mood and Affect: Mood normal.         Behavior: Behavior normal.         Thought Content: Thought content normal.         Judgment: Judgment normal.       Radiology:   CT chest w IV contrast 10/09/2024    Narrative  Interpreted By:  Giovanny Harris,  STUDY:  CT CHEST W IV CONTRAST;  10/9/2024 10:47 am    INDICATION:  Signs/Symptoms:lung nodules, endometrial cancer.    COMPARISON:  07/01/2024, 07/19/2023    ACCESSION NUMBER(S):  [id number]    ORDERING CLINICIAN:  TERA LAWRENCE    TECHNIQUE:  Helical data acquisition of the chest was obtained  without IV  contrast material.  Images were reformatted in axial, coronal, and  sagittal planes.    FINDINGS:  LUNGS AND AIRWAYS:  The trachea and central airways are patent. No endobronchial lesion.    No  atelectasis, consolidation or effusion. Pleural fibrotic change is  seen in the right upper lobe at image 97/289 similar to the previous  examination.    A 3.6 mm nodule is seen in the right middle lobe at axial image  121/289 and sagittal image 29. This has increased in size from the  previous examination of 07/01/2024 when it measured 2.9 mm. A nodule  in the right upper lobe at image 116/289, just above the minor  fissure measures 3.3 mm having increased from 2.9 mm previously  2.2 mm nodule right lobe image 72/289 not seen on the previous  examination.  3.8 mm nodule right lower lobe, image 137/289 increased from 2.7 mm  on the previous study.  4.4 mm nodule right lower lobe at image 139/289 unchanged in size and  density.  3.1 mm nodule right lower lobe at image 154/289 increased from 2.5 mm  previously.  4.9 mm right lower lobe nodule at image 206/289 increased from 3 mm  previously.  3.1 mm left lower lobe nodule at image 217/289, which measured 2.1 mm  previously.  3.4 mm nodule left lower lobe image 171/289 increased from 2.9 mm  previously  3.9 mm nodule left upper lobe at image 85/289 increased from 2.9 mm.  3.7 mm left upper lobe nodule at image 67/289 which was 2.5 mm  previously. Tiny 1-2 mm nodules left lower lobe at image 127/289    MEDIASTINUM AND BRETT, LOWER NECK AND AXILLA:  A 7 mm nodule is seen in the right thyroid lobe on change from  previous examination.    No evidence of thoracic lymphadenopathy by CT criteria.    Esophagus appears within normal limits as seen.    HEART AND VESSELS:  The thoracic aorta is of normal course and caliber with calcification  in the aortic arch.    Main pulmonary artery and its branches are normal in caliber.    No coronary artery calcifications are seen. The study is not  optimized for evaluation of coronary arteries.    The cardiac chambers are not enlarged.    No evidence of pericardial effusion.    UPPER ABDOMEN:  The visualized subdiaphragmatic structures  demonstrate no remarkable  findings.    CHEST WALL AND OSSEOUS STRUCTURES:  There are no suspicious osseous lesions. Multilevel degenerative  changes are present in the thoracic spine.    Impression  1.  Bilateral multiple solid pulmonary parenchymal nodules, many of  which have increased in size from 07/01/2024.  2. No mediastinal lymphadenopathy. No pleural effusion.    MACRO:  None    Signed by: Giovanny Harris 10/14/2024 12:18 PM  Dictation workstation:   [id number]     BI mammo left diagnostic tomosynthesis  Status: Final result     PACS Images     Show images for BI mammo left diagnostic tomosynthesis  Signed by    Signed Time Phone Pager   Denton Wallace MD 8/12/2024 12:12 [phone]     Exam Information    Status Exam Begun Exam Ended   Final 8/12/2024 09:59 8/12/2024 11:07     Study Result    Narrative & Impression   Interpreted By:  Denton Wallace,  and Tanmay Greenberg   STUDY:  BI MAMMO LEFT DIAGNOSTIC TOMOSYNTHESIS;  8/12/2024 11:07 am      ACCESSION NUMBER(S):  [id number]      ORDERING CLINICIAN:  KAMRYN FAULKNER      INDICATION:  Patient recalled for screening mammogram 07/23/2024 with focal  asymmetry with questionable architectural distortion in the left  breast. Additional views are recommended. Ultrasound may be indicated.      COMPARISON:  07/23/2024.      FINDINGS:  MAMMOGRAPHY: 2D and tomosynthesis images were reviewed at 1 mm slice  thickness.      Density:  The breast tissue is heterogeneously dense, which may  obscure small masses.      Previously seen focal asymmetry with questionable architectural  distortion within the central, slightly superior left breast at  middle depth resolves with additional views. No suspicious masses or  calcifications are identified.      IMPRESSION:  No mammographic or targeted sonographic evidence of malignancy.      The prior left breast focal asymmetry with questionable distortion  resolves with the additional views.      BI-RADS CATEGORY:  BI-RADS  Category:  1 Negative.  Recommendation:  Annual Screening.  Recommended Date:  1 Year.  Laterality:  Bilateral.      For any future breast imaging appointments, please call [phone] ([phone]).      I personally reviewed the images/study and I agree with the findings  as stated by Aparna Saucedo MD. This study was interpreted at  [facility], [place], [place].      MACRO:  None      Signed by: Denton Wallace 8/12/2024 12:12 PM  Dictation workstation:   [id number]     Result History    BI mammo left diagnostic tomosynthesis (Order #[id number]) on 8/12/2024 - Order Result History Report    Breast Imaging Recommendations  Mica Vanessa  Annual Screening  - Bilateral (Status: Needs Follow-up, Due: 8/12/2025)    Overall Assessment: 1 - Negative  Reading Physicians: Aparna Saucedo MD; Denton Wallace MD   Performed Procedures: BI mammo left diagnostic tomosynthesis (Accession #: [id number], End Exam: 8/12/2024)   Performing Departments: [facility]      No Notes Result Report  Follow-up orders     Reminder Letters     Create a new patient reminder letter     Create a new provider reminder letter        Result Letters     Create a new result letter    Created by APARNA SAUCEDO on 8/12/2024. Sent by M[facility] LUNG LETTERS, BATCH on 8/12/2024.  View       Mammography Recommendations    Recommendation Side Due   Annual Screening Bilateral 8/12/2025                   Reading Physician  Reading Date    MD Denton Newton MD 8/12/2024 8/12/2024            External Results Report    Open External Results Report    Encounter    View Encounter      BI mammo left diagnostic tomosynthesis (Accession [id number]) (Order [id number])    Image Documentation: Technologist          1. Scar  - Noted on 07/23/2024   Location: 10 o'clock Side: right    2. Scar  - Noted on 07/23/2024   Depth: posterior Side: right    3. Scar  - Noted on 07/23/2024   Location: 2 o'clock Side: left    4. Scar  -  Noted on 08/12/2024   Comments: port scar Location: 12 o'clock Side: left    Documented by FLORA Levin on 08/12/2024    Reviewed by FLORA Levin on 08/12/2024      Plan:  Assessment/Plan     69 year old female 7 months s/p Pelvic RT for endometrial cancer. Patient is doing well with no acute complaints. Exam deferred. UTE on exam with Gyn Onc on 10/21/24. Continue to do Kegel exercises. Can discontinue dilator use when ready.  Reviewed CT of chest. Concerned they the nodules are to small to be picked up by PET CT and biopsy. Discussed with Dr. Palacio as well and she agrees. Will reach out to Dr. Pltaa. If there are concerns for other areas of disease would proceed with PET CT. If main concern is lung nodules given recent COVID and current infection being treated with ATB I would repeat short interval CT in 2-3 months. Continue imaging and follow up with Gyn onc and Dr. Plata as scheduled. Will reach out to  to see if she can help with financial situation. Patient prefer to follow with rad onc on a PRN basis to save on co pays. Instructed to call with any questions or concerns.

## 2024-10-24 ENCOUNTER — SOCIAL WORK (OUTPATIENT)
Dept: CASE MANAGEMENT | Facility: HOSPITAL | Age: 69
End: 2024-10-24
Payer: MEDICARE

## 2024-10-24 ENCOUNTER — TELEPHONE (OUTPATIENT)
Dept: HEMATOLOGY/ONCOLOGY | Facility: CLINIC | Age: 69
End: 2024-10-24
Payer: MEDICARE

## 2024-10-24 NOTE — PROGRESS NOTES
Patient expressing concerns about her increasing medical bills with copays etc. Patient wanting to apply for HCAP but unable to as she is unable to document her estranged husbands finances. She has been  from him for 3 years and knows nothing about him. According to financial counselors, since patient is still , his assets must be declared SW advising patient to contact either the [facility] with volunteer  or  [facility] for advice. This has been emailed to her.    ANAMIKA Choudhury

## 2024-10-24 NOTE — TELEPHONE ENCOUNTER
Call received earlier from the pt want to know if she should keep her PET scan scheduled for tomorrow as she is not a candidate for a lung BX in IR. Per Dr. Plata the pt does not need to keep the PET scan appointment for tomorrow.  He will order a CT scan in 3 months and follow up with the pt at that time. Pt made aware and is agreeable. Will call with any other concerns.

## 2024-10-25 ENCOUNTER — APPOINTMENT (OUTPATIENT)
Dept: RADIOLOGY | Facility: CLINIC | Age: 69
End: 2024-10-25
Payer: MEDICARE

## 2024-11-04 ENCOUNTER — TELEPHONE (OUTPATIENT)
Dept: HEMATOLOGY/ONCOLOGY | Facility: CLINIC | Age: 69
End: 2024-11-04
Payer: MEDICARE

## 2024-11-04 DIAGNOSIS — R91.8 LUNG NODULES: Primary | ICD-10-CM

## 2024-11-04 NOTE — TELEPHONE ENCOUNTER
Received call from patient-Asking if CT scan has been ordered and wanting to reschedule appointment with Dr. Plata. Spoke with patient and let her know that CT order is in and she can go ahead and schedule for 3 months. Patient to call me back to set up follow up with Dr. Plata after CT scan.    none

## 2024-11-12 ENCOUNTER — APPOINTMENT (OUTPATIENT)
Dept: HEMATOLOGY/ONCOLOGY | Facility: CLINIC | Age: 69
End: 2024-11-12
Payer: MEDICARE

## 2025-01-15 ENCOUNTER — OFFICE (OUTPATIENT)
Dept: URBAN - METROPOLITAN AREA CLINIC 26 | Facility: CLINIC | Age: 70
End: 2025-01-15
Payer: MEDICARE

## 2025-01-15 VITALS
TEMPERATURE: 98 F | SYSTOLIC BLOOD PRESSURE: 143 MMHG | DIASTOLIC BLOOD PRESSURE: 79 MMHG | HEIGHT: 61 IN | HEART RATE: 62 BPM | WEIGHT: 139 LBS

## 2025-01-15 DIAGNOSIS — K21.9 GASTRO-ESOPHAGEAL REFLUX DISEASE WITHOUT ESOPHAGITIS: ICD-10-CM

## 2025-01-15 DIAGNOSIS — Z86.0100 PERSONAL HISTORY OF COLON POLYPS, UNSPECIFIED: ICD-10-CM

## 2025-01-15 PROCEDURE — 99213 OFFICE O/P EST LOW 20 MIN: CPT

## 2025-01-15 RX ORDER — ONDANSETRON 4 MG/1
TABLET, ORALLY DISINTEGRATING ORAL
Qty: 3 | Refills: 0 | Status: ACTIVE
Start: 2025-01-15

## 2025-01-21 DIAGNOSIS — C50.919 MALIGNANT NEOPLASM OF FEMALE BREAST, UNSPECIFIED ESTROGEN RECEPTOR STATUS, UNSPECIFIED LATERALITY, UNSPECIFIED SITE OF BREAST: ICD-10-CM

## 2025-01-21 DIAGNOSIS — D69.6 THROMBOCYTOPENIA (CMS-HCC): ICD-10-CM

## 2025-01-23 ENCOUNTER — APPOINTMENT (OUTPATIENT)
Dept: PRIMARY CARE | Facility: CLINIC | Age: 70
End: 2025-01-23
Payer: MEDICARE

## 2025-01-28 ENCOUNTER — LAB (OUTPATIENT)
Dept: LAB | Facility: HOSPITAL | Age: 70
End: 2025-01-28
Payer: MEDICARE

## 2025-01-28 DIAGNOSIS — C50.919 MALIGNANT NEOPLASM OF FEMALE BREAST, UNSPECIFIED ESTROGEN RECEPTOR STATUS, UNSPECIFIED LATERALITY, UNSPECIFIED SITE OF BREAST: ICD-10-CM

## 2025-01-28 LAB
ALBUMIN SERPL BCP-MCNC: 4.5 G/DL (ref 3.4–5)
ALP SERPL-CCNC: 79 U/L (ref 33–136)
ALT SERPL W P-5'-P-CCNC: 11 U/L (ref 7–45)
ANION GAP SERPL CALC-SCNC: 10 MMOL/L (ref 10–20)
AST SERPL W P-5'-P-CCNC: 12 U/L (ref 9–39)
BASOPHILS # BLD AUTO: 0.02 X10*3/UL (ref 0–0.1)
BASOPHILS NFR BLD AUTO: 0.6 %
BILIRUB SERPL-MCNC: 0.5 MG/DL (ref 0–1.2)
BUN SERPL-MCNC: 16 MG/DL (ref 6–23)
CALCIUM SERPL-MCNC: 10 MG/DL (ref 8.6–10.6)
CANCER AG27-29 SERPL-ACNC: 31.7 U/ML (ref 0–38.6)
CEA SERPL-MCNC: 1.7 UG/L
CHLORIDE SERPL-SCNC: 102 MMOL/L (ref 98–107)
CO2 SERPL-SCNC: 30 MMOL/L (ref 21–32)
CREAT SERPL-MCNC: 0.5 MG/DL (ref 0.5–1.05)
EGFRCR SERPLBLD CKD-EPI 2021: >90 ML/MIN/1.73M*2
EOSINOPHIL # BLD AUTO: 0.08 X10*3/UL (ref 0–0.7)
EOSINOPHIL NFR BLD AUTO: 2.3 %
ERYTHROCYTE [DISTWIDTH] IN BLOOD BY AUTOMATED COUNT: 12 % (ref 11.5–14.5)
GLUCOSE SERPL-MCNC: 83 MG/DL (ref 74–99)
HCT VFR BLD AUTO: 44.2 % (ref 36–46)
HGB BLD-MCNC: 14.5 G/DL (ref 12–16)
IMM GRANULOCYTES # BLD AUTO: 0.01 X10*3/UL (ref 0–0.7)
IMM GRANULOCYTES NFR BLD AUTO: 0.3 % (ref 0–0.9)
LYMPHOCYTES # BLD AUTO: 1.05 X10*3/UL (ref 1.2–4.8)
LYMPHOCYTES NFR BLD AUTO: 30.4 %
MCH RBC QN AUTO: 29.2 PG (ref 26–34)
MCHC RBC AUTO-ENTMCNC: 32.8 G/DL (ref 32–36)
MCV RBC AUTO: 89 FL (ref 80–100)
MONOCYTES # BLD AUTO: 0.32 X10*3/UL (ref 0.1–1)
MONOCYTES NFR BLD AUTO: 9.3 %
NEUTROPHILS # BLD AUTO: 1.97 X10*3/UL (ref 1.2–7.7)
NEUTROPHILS NFR BLD AUTO: 57.1 %
NRBC BLD-RTO: 0 /100 WBCS (ref 0–0)
PLATELET # BLD AUTO: 303 X10*3/UL (ref 150–450)
POTASSIUM SERPL-SCNC: 4.2 MMOL/L (ref 3.5–5.3)
PROT SERPL-MCNC: 6.6 G/DL (ref 6.4–8.2)
RBC # BLD AUTO: 4.97 X10*6/UL (ref 4–5.2)
SODIUM SERPL-SCNC: 138 MMOL/L (ref 136–145)
WBC # BLD AUTO: 3.5 X10*3/UL (ref 4.4–11.3)

## 2025-01-28 PROCEDURE — 84075 ASSAY ALKALINE PHOSPHATASE: CPT

## 2025-01-28 PROCEDURE — 85025 COMPLETE CBC W/AUTO DIFF WBC: CPT

## 2025-01-28 PROCEDURE — 86300 IMMUNOASSAY TUMOR CA 15-3: CPT

## 2025-01-28 PROCEDURE — 82378 CARCINOEMBRYONIC ANTIGEN: CPT

## 2025-01-28 PROCEDURE — 80053 COMPREHEN METABOLIC PANEL: CPT

## 2025-01-28 PROCEDURE — 36415 COLL VENOUS BLD VENIPUNCTURE: CPT

## 2025-01-30 VITALS
OXYGEN SATURATION: 97 % | SYSTOLIC BLOOD PRESSURE: 115 MMHG | SYSTOLIC BLOOD PRESSURE: 121 MMHG | RESPIRATION RATE: 17 BRPM | SYSTOLIC BLOOD PRESSURE: 109 MMHG | SYSTOLIC BLOOD PRESSURE: 116 MMHG | OXYGEN SATURATION: 97 % | SYSTOLIC BLOOD PRESSURE: 119 MMHG | HEART RATE: 83 BPM | OXYGEN SATURATION: 98 % | SYSTOLIC BLOOD PRESSURE: 111 MMHG | RESPIRATION RATE: 10 BRPM | TEMPERATURE: 98.2 F | SYSTOLIC BLOOD PRESSURE: 116 MMHG | SYSTOLIC BLOOD PRESSURE: 108 MMHG | HEART RATE: 93 BPM | OXYGEN SATURATION: 93 % | SYSTOLIC BLOOD PRESSURE: 93 MMHG | HEART RATE: 103 BPM | OXYGEN SATURATION: 97 % | DIASTOLIC BLOOD PRESSURE: 60 MMHG | SYSTOLIC BLOOD PRESSURE: 93 MMHG | DIASTOLIC BLOOD PRESSURE: 57 MMHG | DIASTOLIC BLOOD PRESSURE: 60 MMHG | RESPIRATION RATE: 16 BRPM | SYSTOLIC BLOOD PRESSURE: 116 MMHG | RESPIRATION RATE: 20 BRPM | OXYGEN SATURATION: 98 % | SYSTOLIC BLOOD PRESSURE: 125 MMHG | TEMPERATURE: 98.2 F | SYSTOLIC BLOOD PRESSURE: 93 MMHG | DIASTOLIC BLOOD PRESSURE: 66 MMHG | OXYGEN SATURATION: 76 % | DIASTOLIC BLOOD PRESSURE: 67 MMHG | SYSTOLIC BLOOD PRESSURE: 110 MMHG | HEART RATE: 73 BPM | HEART RATE: 103 BPM | DIASTOLIC BLOOD PRESSURE: 52 MMHG | SYSTOLIC BLOOD PRESSURE: 127 MMHG | DIASTOLIC BLOOD PRESSURE: 52 MMHG | RESPIRATION RATE: 10 BRPM | HEART RATE: 75 BPM | RESPIRATION RATE: 17 BRPM | HEART RATE: 111 BPM | DIASTOLIC BLOOD PRESSURE: 73 MMHG | OXYGEN SATURATION: 90 % | OXYGEN SATURATION: 76 % | DIASTOLIC BLOOD PRESSURE: 67 MMHG | DIASTOLIC BLOOD PRESSURE: 66 MMHG | SYSTOLIC BLOOD PRESSURE: 127 MMHG | RESPIRATION RATE: 13 BRPM | SYSTOLIC BLOOD PRESSURE: 119 MMHG | OXYGEN SATURATION: 90 % | HEART RATE: 102 BPM | HEART RATE: 111 BPM | HEART RATE: 83 BPM | OXYGEN SATURATION: 92 % | OXYGEN SATURATION: 91 % | DIASTOLIC BLOOD PRESSURE: 57 MMHG | HEART RATE: 74 BPM | OXYGEN SATURATION: 94 % | DIASTOLIC BLOOD PRESSURE: 73 MMHG | DIASTOLIC BLOOD PRESSURE: 57 MMHG | HEART RATE: 111 BPM | RESPIRATION RATE: 13 BRPM | DIASTOLIC BLOOD PRESSURE: 63 MMHG | HEART RATE: 106 BPM | DIASTOLIC BLOOD PRESSURE: 34 MMHG | SYSTOLIC BLOOD PRESSURE: 111 MMHG | OXYGEN SATURATION: 93 % | RESPIRATION RATE: 10 BRPM | HEART RATE: 102 BPM | DIASTOLIC BLOOD PRESSURE: 59 MMHG | RESPIRATION RATE: 25 BRPM | DIASTOLIC BLOOD PRESSURE: 63 MMHG | HEART RATE: 86 BPM | HEART RATE: 93 BPM | OXYGEN SATURATION: 87 % | OXYGEN SATURATION: 87 % | DIASTOLIC BLOOD PRESSURE: 63 MMHG | HEART RATE: 75 BPM | WEIGHT: 138 LBS | SYSTOLIC BLOOD PRESSURE: 108 MMHG | HEART RATE: 86 BPM | SYSTOLIC BLOOD PRESSURE: 115 MMHG | OXYGEN SATURATION: 94 % | RESPIRATION RATE: 16 BRPM | HEART RATE: 106 BPM | RESPIRATION RATE: 9 BRPM | OXYGEN SATURATION: 87 % | WEIGHT: 138 LBS | HEIGHT: 61 IN | HEART RATE: 74 BPM | DIASTOLIC BLOOD PRESSURE: 62 MMHG | DIASTOLIC BLOOD PRESSURE: 59 MMHG | SYSTOLIC BLOOD PRESSURE: 110 MMHG | HEART RATE: 73 BPM | RESPIRATION RATE: 12 BRPM | HEART RATE: 75 BPM | DIASTOLIC BLOOD PRESSURE: 56 MMHG | DIASTOLIC BLOOD PRESSURE: 56 MMHG | RESPIRATION RATE: 12 BRPM | HEART RATE: 106 BPM | DIASTOLIC BLOOD PRESSURE: 52 MMHG | HEART RATE: 73 BPM | OXYGEN SATURATION: 93 % | HEART RATE: 93 BPM | OXYGEN SATURATION: 76 % | DIASTOLIC BLOOD PRESSURE: 66 MMHG | SYSTOLIC BLOOD PRESSURE: 110 MMHG | SYSTOLIC BLOOD PRESSURE: 125 MMHG | SYSTOLIC BLOOD PRESSURE: 121 MMHG | SYSTOLIC BLOOD PRESSURE: 119 MMHG | DIASTOLIC BLOOD PRESSURE: 56 MMHG | DIASTOLIC BLOOD PRESSURE: 59 MMHG | SYSTOLIC BLOOD PRESSURE: 115 MMHG | SYSTOLIC BLOOD PRESSURE: 109 MMHG | RESPIRATION RATE: 20 BRPM | SYSTOLIC BLOOD PRESSURE: 125 MMHG | OXYGEN SATURATION: 92 % | DIASTOLIC BLOOD PRESSURE: 67 MMHG | DIASTOLIC BLOOD PRESSURE: 60 MMHG | TEMPERATURE: 98.2 F | SYSTOLIC BLOOD PRESSURE: 111 MMHG | DIASTOLIC BLOOD PRESSURE: 73 MMHG | HEART RATE: 86 BPM | SYSTOLIC BLOOD PRESSURE: 127 MMHG | DIASTOLIC BLOOD PRESSURE: 62 MMHG | OXYGEN SATURATION: 90 % | HEIGHT: 61 IN | RESPIRATION RATE: 17 BRPM | RESPIRATION RATE: 20 BRPM | OXYGEN SATURATION: 94 % | OXYGEN SATURATION: 91 % | RESPIRATION RATE: 9 BRPM | OXYGEN SATURATION: 98 % | RESPIRATION RATE: 12 BRPM | HEART RATE: 83 BPM | RESPIRATION RATE: 25 BRPM | OXYGEN SATURATION: 91 % | HEART RATE: 103 BPM | WEIGHT: 138 LBS | OXYGEN SATURATION: 92 % | SYSTOLIC BLOOD PRESSURE: 121 MMHG | SYSTOLIC BLOOD PRESSURE: 109 MMHG | DIASTOLIC BLOOD PRESSURE: 34 MMHG | HEIGHT: 61 IN | SYSTOLIC BLOOD PRESSURE: 108 MMHG | DIASTOLIC BLOOD PRESSURE: 62 MMHG | RESPIRATION RATE: 16 BRPM | HEART RATE: 74 BPM | RESPIRATION RATE: 9 BRPM | RESPIRATION RATE: 13 BRPM | RESPIRATION RATE: 25 BRPM | HEART RATE: 102 BPM | DIASTOLIC BLOOD PRESSURE: 34 MMHG

## 2025-02-04 ENCOUNTER — HOSPITAL ENCOUNTER (OUTPATIENT)
Dept: RADIOLOGY | Facility: CLINIC | Age: 70
Discharge: HOME | End: 2025-02-04
Payer: MEDICARE

## 2025-02-04 DIAGNOSIS — R91.8 LUNG NODULES: ICD-10-CM

## 2025-02-04 PROCEDURE — 71260 CT THORAX DX C+: CPT

## 2025-02-04 PROCEDURE — 71260 CT THORAX DX C+: CPT | Performed by: RADIOLOGY

## 2025-02-04 PROCEDURE — 2550000001 HC RX 255 CONTRASTS: Performed by: INTERNAL MEDICINE

## 2025-02-04 RX ADMIN — IOHEXOL 70 ML: 350 INJECTION, SOLUTION INTRAVENOUS at 09:09

## 2025-02-06 ENCOUNTER — AMBULATORY SURGICAL CENTER (OUTPATIENT)
Dept: URBAN - METROPOLITAN AREA SURGERY 12 | Facility: SURGERY | Age: 70
End: 2025-02-06
Payer: MEDICARE

## 2025-02-06 ENCOUNTER — OFFICE (OUTPATIENT)
Dept: URBAN - METROPOLITAN AREA PATHOLOGY 2 | Facility: PATHOLOGY | Age: 70
End: 2025-02-06
Payer: MEDICARE

## 2025-02-06 DIAGNOSIS — K44.9 DIAPHRAGMATIC HERNIA WITHOUT OBSTRUCTION OR GANGRENE: ICD-10-CM

## 2025-02-06 DIAGNOSIS — Z86.0101 PERSONAL HISTORY OF ADENOMATOUS AND SERRATED COLON POLYPS: ICD-10-CM

## 2025-02-06 DIAGNOSIS — K63.5 POLYP OF COLON: ICD-10-CM

## 2025-02-06 DIAGNOSIS — Z09 ENCOUNTER FOR FOLLOW-UP EXAMINATION AFTER COMPLETED TREATMEN: ICD-10-CM

## 2025-02-06 DIAGNOSIS — K31.7 POLYP OF STOMACH AND DUODENUM: ICD-10-CM

## 2025-02-06 DIAGNOSIS — K64.8 OTHER HEMORRHOIDS: ICD-10-CM

## 2025-02-06 DIAGNOSIS — K22.70 BARRETT'S ESOPHAGUS WITHOUT DYSPLASIA: ICD-10-CM

## 2025-02-06 DIAGNOSIS — K64.4 RESIDUAL HEMORRHOIDAL SKIN TAGS: ICD-10-CM

## 2025-02-06 DIAGNOSIS — D12.3 BENIGN NEOPLASM OF TRANSVERSE COLON: ICD-10-CM

## 2025-02-06 DIAGNOSIS — K31.89 OTHER DISEASES OF STOMACH AND DUODENUM: ICD-10-CM

## 2025-02-06 DIAGNOSIS — K22.89 OTHER SPECIFIED DISEASE OF ESOPHAGUS: ICD-10-CM

## 2025-02-06 PROCEDURE — 43239 EGD BIOPSY SINGLE/MULTIPLE: CPT | Mod: 51 | Performed by: INTERNAL MEDICINE

## 2025-02-06 PROCEDURE — 45384 COLONOSCOPY W/LESION REMOVAL: CPT | Performed by: INTERNAL MEDICINE

## 2025-02-06 PROCEDURE — 88313 SPECIAL STAINS GROUP 2: CPT | Performed by: PATHOLOGY

## 2025-02-06 PROCEDURE — 88305 TISSUE EXAM BY PATHOLOGIST: CPT | Performed by: PATHOLOGY

## 2025-02-06 PROCEDURE — 88342 IMHCHEM/IMCYTCHM 1ST ANTB: CPT | Performed by: PATHOLOGY

## 2025-02-07 ENCOUNTER — APPOINTMENT (OUTPATIENT)
Dept: PRIMARY CARE | Facility: CLINIC | Age: 70
End: 2025-02-07
Payer: MEDICARE

## 2025-02-07 VITALS
HEART RATE: 92 BPM | RESPIRATION RATE: 16 BRPM | TEMPERATURE: 97.6 F | WEIGHT: 136 LBS | HEIGHT: 62 IN | DIASTOLIC BLOOD PRESSURE: 82 MMHG | SYSTOLIC BLOOD PRESSURE: 138 MMHG | BODY MASS INDEX: 25.03 KG/M2

## 2025-02-07 DIAGNOSIS — R91.8 LUNG NODULES: ICD-10-CM

## 2025-02-07 DIAGNOSIS — K21.00 GASTROESOPHAGEAL REFLUX DISEASE WITH ESOPHAGITIS, UNSPECIFIED WHETHER HEMORRHAGE: ICD-10-CM

## 2025-02-07 DIAGNOSIS — I10 HTN (HYPERTENSION), BENIGN: ICD-10-CM

## 2025-02-07 DIAGNOSIS — C54.1 MALIGNANT NEOPLASM OF ENDOMETRIUM (MULTI): ICD-10-CM

## 2025-02-07 DIAGNOSIS — F41.9 ANXIETY: Primary | ICD-10-CM

## 2025-02-07 DIAGNOSIS — E04.1 THYROID NODULE: ICD-10-CM

## 2025-02-07 RX ORDER — PROPRANOLOL HYDROCHLORIDE 10 MG/1
10 TABLET ORAL 2 TIMES DAILY PRN
COMMUNITY
Start: 2025-01-29 | End: 2026-01-29

## 2025-02-07 RX ORDER — CALCIUM CARBONATE/VITAMIN D3 500-10/5ML
LIQUID (ML) ORAL
COMMUNITY

## 2025-02-07 RX ORDER — HYDROXYZINE HYDROCHLORIDE 25 MG/1
25 TABLET, FILM COATED ORAL EVERY 8 HOURS PRN
Qty: 90 TABLET | Refills: 0 | Status: SHIPPED | OUTPATIENT
Start: 2025-02-07 | End: 2025-03-09

## 2025-02-07 NOTE — PROGRESS NOTES
Subjective   Mica Vanessa is a 69 y.o. female who presents fo  Endometrial cancer  DEAN and radiation to pelvis in 2023, initially cancer was contained to uterus but lung CT showed concern for metastasis, have been growing in size. No lung symptoms. She also was noted to have a new thyroid nodule on CT with follow up US recommended.  Follows with Dr. Plata for oncology, initially established for breast cancer years ago. She is s/p lumpectomy, chemo, radiation with regular mammograms for follow up.  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    Anxiety  She does endorse some current anxiety, mostly related to cancer history and ongoing medical issues.     HTN  Diagnosed years ago, controlled with lisinopril. She is taking propranolol BID which is prescribed PRN for anxiety, but has not noticed significant change to blood pressure.     GERD c/b Barretts esophagus (2020)  S/p endoscopy with biopsies. Controlled with nexium.    She is a retired , takes care of grandkids for exercise. She lives alone, is . No safety concerns. She is a former smoker, with a 20 pack year history, quit around 2007.     Health Maintenance  Colonoscopy done 2025, no history of abnormal, results pending.  Last mammogram 2024, no concerns.  Yearly pelvic exams with OBGYN.   Follows with dermatology for skin checks.         Visit Vitals  /82   Pulse 92   Temp 36.4 °C (97.6 °F)   Resp 16      Objective   Physical Exam  Constitutional:       Appearance: She is not ill-appearing.   Cardiovascular:      Rate and Rhythm: Normal rate and regular rhythm.   Pulmonary:      Effort: Pulmonary effort is normal.      Breath sounds: No wheezing, rhonchi or rales.   Musculoskeletal:      Cervical back: Neck supple.   Lymphadenopathy:      Cervical: No cervical adenopathy.   Skin:     General: Skin is warm and dry.   Neurological:      General: No focal deficit present.      Mental  Status: She is alert.   Psychiatric:         Mood and Affect: Mood normal.         Assessment & Plan  Anxiety  Patient with episodic situational anxiety related to cancer diagnosis and ongoing workup.  Propranolol as needed twice daily has not been helpful at reducing anxiety.  Requests as needed medication, will trial Atarax.  She can also stop propranolol and monitor for increase in blood pressure or palpitations/other symptoms.  If anxiety persists, we can consider daily medication such as Lexapro to reduce baseline anxiety.  Orders:    hydrOXYzine HCL (Atarax) 25 mg tablet; Take 1 tablet (25 mg) by mouth every 8 hours if needed for anxiety.    HTN (hypertension), benign  History of controlled hypertension on lisinopril, currently taking propranolol twice daily prescribed for anxiety.  She does endorse an element of whitecoat hypertension.  We can trial discontinuing propranolol and monitoring for changes in blood pressure with follow-up in 3 months.       Gastroesophageal reflux disease with esophagitis, unspecified whether hemorrhage  History of GERD complicated by Khan's esophagus diagnosed in 2020 via EGD.  She does follow with GI currently symptoms are well-controlled with, Nexium.       Lung nodules  Numerous nodules noted on serial CT which are increasing in size and number concerning for metastasis of known endometrial cancer.  Following up with oncology on Monday to discuss next steps, including possible biopsy.       Thyroid nodule  Thyroid nodule noted on recent CT, without history of thyroid abnormality or symptoms concerning of hyper or hypoactive thyroid.  Discussed obtaining TSH, free T4, thyroid ultrasound, however she prefers to discuss with her oncologist prior to pursuing this workup.  We will follow-up based on the discussion on Monday.                 Joi Valle DO  Family Medicine Resident, PGY2     02/07/25 1:10 PM

## 2025-02-07 NOTE — ASSESSMENT & PLAN NOTE
History of controlled hypertension on lisinopril, currently taking propranolol twice daily prescribed for anxiety.  She does endorse an element of whitecoat hypertension.  We can trial discontinuing propranolol and monitoring for changes in blood pressure with follow-up in 3 months.

## 2025-02-07 NOTE — ASSESSMENT & PLAN NOTE
Numerous nodules noted on serial CT which are increasing in size and number concerning for metastasis of known endometrial cancer.  Following up with oncology on Monday to discuss next steps, including possible biopsy.

## 2025-02-07 NOTE — ASSESSMENT & PLAN NOTE
History of GERD complicated by Khan's esophagus diagnosed in 2020 via EGD.  She does follow with GI currently symptoms are well-controlled with, Nexium.

## 2025-02-10 ENCOUNTER — OFFICE VISIT (OUTPATIENT)
Dept: HEMATOLOGY/ONCOLOGY | Facility: CLINIC | Age: 70
End: 2025-02-10
Payer: MEDICARE

## 2025-02-10 VITALS
HEIGHT: 61 IN | RESPIRATION RATE: 16 BRPM | WEIGHT: 136.91 LBS | SYSTOLIC BLOOD PRESSURE: 164 MMHG | BODY MASS INDEX: 25.85 KG/M2 | HEART RATE: 72 BPM | TEMPERATURE: 97.9 F | OXYGEN SATURATION: 96 % | DIASTOLIC BLOOD PRESSURE: 71 MMHG

## 2025-02-10 DIAGNOSIS — C54.1 MALIGNANT NEOPLASM OF ENDOMETRIUM (MULTI): Primary | ICD-10-CM

## 2025-02-10 DIAGNOSIS — R91.8 LUNG NODULES: ICD-10-CM

## 2025-02-10 DIAGNOSIS — C50.919 MALIGNANT NEOPLASM OF FEMALE BREAST, UNSPECIFIED ESTROGEN RECEPTOR STATUS, UNSPECIFIED LATERALITY, UNSPECIFIED SITE OF BREAST: ICD-10-CM

## 2025-02-10 PROCEDURE — 99215 OFFICE O/P EST HI 40 MIN: CPT | Performed by: INTERNAL MEDICINE

## 2025-02-10 PROCEDURE — 3008F BODY MASS INDEX DOCD: CPT | Performed by: INTERNAL MEDICINE

## 2025-02-10 PROCEDURE — 1126F AMNT PAIN NOTED NONE PRSNT: CPT | Performed by: INTERNAL MEDICINE

## 2025-02-10 PROCEDURE — 3077F SYST BP >= 140 MM HG: CPT | Performed by: INTERNAL MEDICINE

## 2025-02-10 PROCEDURE — 3078F DIAST BP <80 MM HG: CPT | Performed by: INTERNAL MEDICINE

## 2025-02-10 PROCEDURE — 1159F MED LIST DOCD IN RCRD: CPT | Performed by: INTERNAL MEDICINE

## 2025-02-10 ASSESSMENT — PAIN SCALES - GENERAL: PAINLEVEL_OUTOF10: 0-NO PAIN

## 2025-02-10 NOTE — PROGRESS NOTES
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 Intermediate score on Oncotype DX. Systemic adjuvant chemotherapy. She was on tamoxifen from October 2007 in October 2000 and and switched to Arimidex original diagnosis in May 2007.     History of lobular carcinoma in situ of left breast in May 2003.     The patient's 32-year-old daughter was diagnosed as having ER, VT, HER negative breast cancer in July 2016. She received neoadjuvant chemotherapy with CA, followed by Taxol. She was placed on maintenance Xeloda for 6 months at [facility]. She is  BRCA negative.     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     History of hypertension.      10/6/23 EMB w/ uterine cancer, mesonephric type  10/21/23 Total laparoscopic hysterectomy bilateral salpingo oophorectomy, Bilateral sentinel lymph node injection and mapping, Left pelvic sentinel lymph node dissection and right complete pelvic lymphadenectomy, Cystourethroscopy.  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 MMRp, P53 alison type. TB recommendations for radiation oncology consult, genetics referral, consider systemic therapy  Adjuvant pelvic RT  7/2024 - new pulmonary nodules, all less than 1cm           History of Present Illness:      ID Statement:    JANET MENG is a 67 year old Female        Chief Complaint: follow-up   Interval History:    The patient had  come  for a follow-up on 1/24/23 regarding a recent fall that resulted in broken ribs and prompted imaging studies that revealed pulmonary nodules.  The patient has a history of  T1N0M0 Moderate to fully differentiated infiltrating ductal carcinoma. She remains s/p lumpectomy and axillary lymph node resection. S/p systemic chemotherapy. Was  on Tamoxifen from 10/2007 till 10/2010 then switched to Arimidex 1 mg PO QD.  She reports feeling well and denies any new symptoms other than pain related to her breaking her ribs after a fall.  In  ER CT scan confirmed the fractured ribs but revealed lung nodules which are new.  The patient has occasional cough but denies shortness of breath chest pain hemoptysis.      The patient had come for follow-up on July 26, 2023 regarding history of T1 N0 M0 moderately poorly differentiated infiltrating ductal carcinoma.  S/p lumpectomy axillary lymph node dissection.  S/p systemic chemotherapy.  Received tamoxifen from October 2007 through October 2010 then switched to Arimidex 1 mg p.o. daily doing well denied any symptoms.   The patient had come for follow-up on October 15, 2024 regarding history of T1 N0 M0 moderately poorly differentiated infiltrating ductal carcinoma.  S/p lumpectomy axillary lymph node dissection.  S/p systemic chemotherapy.  Received tamoxifen from October 2007 through October 2010 then switched to Arimidex 1 mg p.o. daily.  The patient presented with postmenopausal bleeding was evaluated further and noted to have endometrial carcinoma mesonephric type.  Underwent DEAN/BSO on October 21, 2023.  Stage Ib, positive LVSI, MMR P, p53 wild-type.  Did receive radiation therapy at [facility].  Was referred for genetic counseling.  CT scan of the chest abdomen pelvis on July 2024 revealed previously existing nodules which are unchanged but new pulmonary nodules.  A mammogram also revealed architectural distortion at 12 o'clock position superior to the nipple.  The  patient had a port during previous evaluation and possible scar.     The patient had come for follow-up on 2/10/25 regarding history of T1 N0 M0 moderately poorly differentiated infiltrating ductal carcinoma.  S/p lumpectomy axillary lymph node dissection.  S/p systemic chemotherapy.  Received tamoxifen from October 2007 through October 2010 then switched to Arimidex 1 mg p.o. daily.  The patient presented with postmenopausal bleeding was evaluated further and noted to have endometrial carcinoma mesonephric type.  Underwent DEAN/BSO on October 21, 2023.  Stage Ib, positive LVSI, MMR P, p53 wild-type.  Did receive radiation therapy at [facility] [place].  Was referred for genetic counseling.  CT scan of the chest abdomen pelvis on July 2024 revealed previously existing nodules which are unchanged but new pulmonary nodules.  A mammogram also revealed architectural distortion at 12 o'clock position superior to the nipple.  The patient had a port during previous evaluation and possible scar.  Past medical history  1. Breast cancer  2. 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 Lumpectomy    Colonoscopy/EGD on February 6, 2025.     Review of Systems:   ·  System Review All other systems have been reviewed and are negative for complaint.      · Constitutional NEGATIVE: Fever, Chills, Anorexia, Weight Loss, Malaise      · Eyes NEGATIVE: Blurry Vision, Drainage, Diploplia, Redness, Vision Loss/ Change      · ENMT NEGATIVE: Nasal Discharge, Nasal Congestion, Ear Pain, Mouth Pain, Throat Pain      · Respiratory NEGATIVE: Dry Cough, Productive Cough, Hemoptysis, Wheezing, Shortness of Breath      · Cardiology NEGATIVE: Chest Pain, Dyspnea on Exertion, Orthopnea, Palpitations, Syncope      · Gastrointestinal NEGATIVE: Abdominal Pain, Constipation, Diarrhea, Nausea, Vomiting      · Genitourinary NEGATIVE: Discharge, Dysuria, Flank Pain, Frequency, Hematuria      · Musculoskeletal  NEGATIVE: Decreased ROM, Pain, Swelling, Stiffness, Weakness      · Neurological NEGATIVE: Dizziness, Confusion, Headache, Seizures, Syncope      · Psychiatric NEGATIVE: Mood Changes, Anxiety, Hallucinations, Sleep Changes, Suicidal Ideas      · Skin NEGATIVE: Mass, Pain, Pruritus, Rash, Ulcer      · Endocrine NEGATIVE: Heat Intolerance, Cold Intolerance, Sweat, Polyuria, Thirst      · Hematologic/Lymph NEGATIVE: Anemia, Bruising, Easy Bleeding, Night Sweats, Petechiae      · Allergic/Immunologic NEGATIVE: Anaphylaxis, Itchy/ Teary Eyes, Itching, Sneezing, Swelling      · Breast NEGATIVE: Pain, Mass, Discharge, Nipple Itching, Gynecomastia         Allergies and Intolerances:       Allergies:         No Known Allergies: Active     Outpatient Medication Profile:  * Patient Currently Takes Medications as of 26-Jul-2023 11:01 documented in Structured Notes         cefuroxime 250 mg oral tablet : Last Dose Taken:  , 1 tab(s) orally 2 times a day , Start Date: 26-Oct-2022         gemfibrozil 600 mg oral tablet: Last Dose Taken:  , 1 tab(s) orally 2 times  a day         lisinopril 10 mg oral tablet: Last Dose Taken:  , 1 tab(s) orally once  a day         Daily Multiple Vitamins oral tablet: Last Dose Taken:  , 1 tab(s) orally  once a day         Flonase 50 mcg/inh nasal spray: Last Dose Taken:  , 1 spray(s) nasal  once a day         esomeprazole 40 mg oral delayed release capsule: Last Dose Taken:  ,  1 cap(s) orally once a day             Medical History:         Breast cancer: ICD-10: C50.919, Status: Active       Surg History:         History of lumpectomy: ICD-10: Z98.890, Status: Active     Family History: No Family History items are recorded  in the problem list.      Social History:   Social Substance History:  ·  Social History denies smoking, alcohol and drug use   ·  Smoking Status never smoker   ·  Tobacco Use denies   ·  Alcohol Use denies   ·  Drug Use denies   ·  Additional History      PPD for 30years(1)            Vitals and Measurements:   Vitals: Temp: 36.8  HR: 66  RR: 18  BP: 129/67  SPO2%:   96   Measurements: HT(cm): 156.5  WT(kg): 65.6  BSA: 1.68   BMI:  26.7      Physical Exam:      Constitutional: Well developed, awake/alert/oriented  x3, no distress, alert and cooperative   Eyes: PERRL, EOMI, clear sclera   ENMT: mucous membranes moist, no apparent injury,  no lesions seen   Head/Neck: Neck supple, no apparent injury, thyroid  without mass or tenderness, No JVD, trachea midline, no bruits   Respiratory/Thorax: Patent airways, CTAB, normal  breath sounds with good chest expansion, thorax symmetric   Cardiovascular: Regular, rate and rhythm, no murmurs,  2+ equal pulses of the extremities, normal S 1and S 2   Gastrointestinal: Nondistended, soft, non-tender,  no rebound tenderness or guarding, no masses palpable, no organomegaly, +BS, no bruits   Genitourinary: No Discharge, vesicles or other abnormalities   Musculoskeletal: ROM intact, no joint swelling, normal  strength   Extremities: normal extremities, no cyanosis edema,  contusions or wounds, no clubbing   Neurological: alert and oriented x3, intact senses,  motor, response and reflexes, normal strength   Breast: No masses, tenderness, no discharge or discoloration   Lymphatic: No significant lymphadenopathy   Psychological: Appropriate mood and behavior   Skin: Warm and dry, no lesions, no rashes         Lab Results:      Radiology Result:     ·  Results     No Results have been selected.  Please select Results from the Available Results list before marking as Reviewed.     Assessment and Plan:       1. T1N0M0 Moderate to fully differentiated infiltrating ductal carcinoma, ER/AK positive HER2 negative  S/p lumpectomy and axillary lymph node resection. S/p systemic chemotherapy. Was on Tamoxifen from 10/2007 till 10/2010 then switched to Arimidex. Original diagnosis in 2007.      The patient had come for a follow up 1/24/23. Physical exam within normal  limits. Reviewed lab data with the patient. CBC on 10/05/2022 revealed WBC 6.1, HGB 14.0, HCT 43.2, , Neut 3.71, , K 4.5, , BUN 16, CREAT 0.60. CEA at 1.1 anf  CA 27-29 was at 28.5 on a reference range of 0-38.6. Mammogram on 07/01/2021 revealed benign findings. The patient recently sustained a fall that resulted in broken ribs and prompted imaging studies that revealed pulmonary nodules.  I recommended that the patient undergo a PET scan with tumor markers and reassess..  PET scan was denied by insurance company.  Options include do nothing and repeat CT scans in 3 months or sooner  if symptomatic.  Refer to pulmonary services for consideration of bronchoscopy and biopsy or refer to CT surgery for open biopsy.  After thinking through all the options the patient decided to come after 3 months.  CT scan of chest abdomen pelvis in December 27, 2022 revealed a 3 mm nodule in the right lower lobe which is unchanged.  There are thyroid nodules.  Overall these are stable findings.  Abdomen and pelvis were negative.  The patient is pleased we will repeat studies in 6 months and if remains stable  will follow clinically after that the patient is agreeable.     The patient had come for follow-up on July 26, 2023 regarding history of T1 N0 M0 infiltrating ductal carcinoma of the left breast s/p lumpectomy radiation therapy axillary lymph node dissection s/p systemic chemotherapy.  Received tamoxifen, October 2000 7 October 2010 then switched to Arimidex 1 mg p.o. daily.  A mammogram on July 21, 2023 revealed benign findings.  CT scan of chest abdomen pelvis done as a follow-up for pulmonary nodules showed mucous plugging in upper lobes and unchanged rest  of the nodules.  No evidence of local or distal recurrence.  The patient is pleased.  Return in 1 year.    The patient had come for follow-up on October 15, 2024  regarding history of T1 N0 M0 moderately poorly differentiated infiltrating ductal  carcinoma.  S/p lumpectomy axillary lymph node dissection.  S/p systemic chemotherapy.  Received tamoxifen from October 2007 through October 2010 then switched to Arimidex 1 mg p.o. daily.  The patient presented with postmenopausal bleeding was evaluated further and noted to have endometrial carcinoma mesonephric type.  Underwent DEAN/BSO on October 21, 2023.  Stage Ib, positive LVSI, MMR P, p53 wild-type.  Did receive radiation therapy at [facility].  Was referred for genetic counseling.  CT scan of the chest abdomen pelvis on July 2024 revealed previously existing nodules which are unchanged but new pulmonary nodules.  A mammogram also revealed architectural distortion at 12 o'clock position superior to the nipple.  The patient had a port during previous evaluation and possible scar.  I have recommended ultrasound of left breast.  Return in 4 weeks.  CT scan of chest on October 14, 2024 revealedBilateral multiple solid pulmonary parenchymal nodules, many of  which have increased in size from 07/01/2024.  2. No mediastinal lymphadenopathy. No pleural effusion..  I have recommended a PET scan as well as a CT-guided biopsy of the most accessible lung nodule which is increasing in size.  Return in 3 weeks.     The patient had come for follow-up on 2/10/25 regarding history of T1 N0 M0 moderately poorly differentiated infiltrating ductal carcinoma.  S/p lumpectomy axillary lymph node dissection.  S/p systemic chemotherapy.  Received tamoxifen from October 2007 through October 2010 then switched to Arimidex 1 mg p.o. daily.  The patient presented with postmenopausal bleeding was evaluated further and noted to have endometrial carcinoma mesonephric type.  Underwent DEAN/BSO on October 21, 2023.  Stage Ib, positive LVSI, MMR P, p53 wild-type.  Did receive radiation therapy at [facility].  Was referred for genetic counseling.  CT scan of the chest abdomen pelvis on July 2024 revealed previously existing nodules which are  unchanged but new pulmonary nodules.  A mammogram also revealed architectural distortion at 12 o'clock position superior to the nipple.  The patient had a port during previous evaluation and possible scar.  CT scan of chest on February 3, 2025 revealed increasing number and size of lung nodules.  I have recommended CT-guided lung biopsy.     2. 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  Khan's esophagus on upper endoscopy in September 2021.  Follow-up with GI services.

## 2025-02-17 ENCOUNTER — OFFICE VISIT (OUTPATIENT)
Dept: PRIMARY CARE | Facility: CLINIC | Age: 70
End: 2025-02-17
Payer: MEDICARE

## 2025-02-17 ENCOUNTER — HOSPITAL ENCOUNTER (OUTPATIENT)
Dept: RADIOLOGY | Facility: CLINIC | Age: 70
Discharge: HOME | End: 2025-02-17
Payer: MEDICARE

## 2025-02-17 VITALS
OXYGEN SATURATION: 95 % | HEIGHT: 60 IN | RESPIRATION RATE: 16 BRPM | HEART RATE: 123 BPM | WEIGHT: 135.2 LBS | SYSTOLIC BLOOD PRESSURE: 129 MMHG | TEMPERATURE: 98.1 F | BODY MASS INDEX: 26.55 KG/M2 | DIASTOLIC BLOOD PRESSURE: 77 MMHG

## 2025-02-17 DIAGNOSIS — J06.9 UPPER RESPIRATORY TRACT INFECTION, UNSPECIFIED TYPE: ICD-10-CM

## 2025-02-17 DIAGNOSIS — R00.0 TACHYCARDIA: ICD-10-CM

## 2025-02-17 DIAGNOSIS — J06.9 UPPER RESPIRATORY TRACT INFECTION, UNSPECIFIED TYPE: Primary | ICD-10-CM

## 2025-02-17 PROBLEM — Z85.3 HISTORY OF MALIGNANT NEOPLASM OF BREAST: Status: ACTIVE | Noted: 2025-02-17

## 2025-02-17 PROBLEM — J30.2 SEASONAL ALLERGIES: Status: ACTIVE | Noted: 2025-02-17

## 2025-02-17 LAB
POC RAPID INFLUENZA A: NEGATIVE
POC RAPID INFLUENZA B: NEGATIVE
POC SARS-COV-2 AG BINAX: NORMAL

## 2025-02-17 PROCEDURE — 93000 ELECTROCARDIOGRAM COMPLETE: CPT | Performed by: FAMILY MEDICINE

## 2025-02-17 PROCEDURE — 3008F BODY MASS INDEX DOCD: CPT

## 2025-02-17 PROCEDURE — 87804 INFLUENZA ASSAY W/OPTIC: CPT

## 2025-02-17 PROCEDURE — G2211 COMPLEX E/M VISIT ADD ON: HCPCS

## 2025-02-17 PROCEDURE — 87811 SARS-COV-2 COVID19 W/OPTIC: CPT

## 2025-02-17 PROCEDURE — 1159F MED LIST DOCD IN RCRD: CPT

## 2025-02-17 PROCEDURE — 71046 X-RAY EXAM CHEST 2 VIEWS: CPT | Performed by: RADIOLOGY

## 2025-02-17 PROCEDURE — 1036F TOBACCO NON-USER: CPT

## 2025-02-17 PROCEDURE — 99214 OFFICE O/P EST MOD 30 MIN: CPT

## 2025-02-17 PROCEDURE — 71046 X-RAY EXAM CHEST 2 VIEWS: CPT

## 2025-02-17 PROCEDURE — 3074F SYST BP LT 130 MM HG: CPT

## 2025-02-17 PROCEDURE — 3078F DIAST BP <80 MM HG: CPT

## 2025-02-17 RX ORDER — AZITHROMYCIN 250 MG/1
TABLET, FILM COATED ORAL
Qty: 6 TABLET | Refills: 0 | Status: SHIPPED | OUTPATIENT
Start: 2025-02-17 | End: 2025-02-22

## 2025-02-17 RX ORDER — IPRATROPIUM BROMIDE 21 UG/1
2 SPRAY, METERED NASAL EVERY 12 HOURS
Qty: 30 ML | Refills: 1 | Status: SHIPPED | OUTPATIENT
Start: 2025-02-17 | End: 2026-02-17

## 2025-02-17 NOTE — PROGRESS NOTES
Subjective   Mica Vanessa is a 69 y.o. female who presents for Cough and Anxiety (Discuss medication options ).    Patient presents with symptoms of cough, congestion, sore throat, fatigue, chills that started last Thursday.  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  She has tried treating her symptoms with Mucinex to some success, but otherwise has not taken any medications.  Patient risk factors include history of breast cancer, endometrial cancer, recent finding of changing lung nodule which is currently being worked up.  Patient is a former smoker and says she quit approximately 5 to 10 years ago.  Denies any other pulmonary diagnoses such as COPD or asthma.  Denies any history of blood clots or pulmonary embolism.  Denies any leg swelling, redness, leg pain, chest pain, dizziness, lightheadedness.      Visit Vitals  /77 (BP Location: Right arm, Patient Position: Sitting, BP Cuff Size: Adult)   Pulse (!) 123   Temp 36.7 °C (98.1 °F) (Temporal)   Resp 16      Objective   Physical Exam  Vitals reviewed.   Constitutional:       General: She is not in acute distress.     Appearance: Normal appearance. She is not toxic-appearing.   HENT:      Head: Normocephalic and atraumatic.      Nose: Nose normal.   Eyes:      Extraocular Movements: Extraocular movements intact.   Cardiovascular:      Rate and Rhythm: Regular rhythm. Tachycardia present.      Heart sounds: No murmur heard.     No friction rub. No gallop.      Comments: Initially tachycardiac, but resolved during visit.   Pulmonary:      Effort: Pulmonary effort is normal. No respiratory distress.      Breath sounds: Wheezing (faint end expiratory) present. No rhonchi or rales.   Skin:     General: Skin is warm and dry.   Neurological:      General: No focal deficit present.      Mental Status: She is alert.   Psychiatric:         Mood and Affect: Mood normal.         Behavior: Behavior  normal.            Assessment/Plan      Assessment & Plan  Upper respiratory tract infection, unspecified type  Patient presenting with symptoms of cough, congestion, sore throat, fatigue.  She has been exposed to several community viral pathogens and likely contracted something from these exposures.  COVID and flu in office were negative.  Initially patient was tachycardic and given her history of malignancy there was concern could represent more severe pathology such as DVT/PE.  On examination she does not clinically have signs or symptoms of DVT and she does not have other symptoms suggestive of PE such as pleuritic chest pain, dyspnea, hypoxia.  EKG did not show any acute strain patterns suggestive of ACS or PE.  Additionally her tachycardia did resolve throughout the visit and on EKG had rate of 88 bpm.  Ultimately think it is more likely that her tachycardia is secondary to viral infection and decreased fluid intake leading to mild under hydration.  Will treat symptoms with supportive care including increasing fluid intake, Mucinex, Atrovent nasal spray.  Given the fact the patient does have high risk for superimposed bacterial infection and history of smoking (no diagnosis of COPD, however there are some end expiratory wheezes) and treat with Z-Naveen as well.  Orders:    POCT BinaxNOW Covid-19 Ag Card manually resulted    POCT Influenza A/B manually resulted    XR chest 2 views; Future    azithromycin (Zithromax) 250 mg tablet; Take 2 tablets (500 mg) by mouth once daily for 1 day, THEN 1 tablet (250 mg) once daily for 4 days. Take 2 tabs (500 mg) by mouth today, than 1 daily for 4 days..    ipratropium (Atrovent) 21 mcg (0.03 %) nasal spray; Administer 2 sprays into each nostril every 12 hours.    Tachycardia  Initially tachycardic heart rate 123.  EKG was performed and heart rate normalized at that time with a rate of 88.   Orders:    ECG 12 lead (Clinic Performed)           This note was partially created  "using voice recognition software and is inherently subject to errors including those of syntax and \"sound-alike\" substitutions which may escape proofreading. In such instances, original meaning may be extrapolated by contextual derivation.      "

## 2025-02-17 NOTE — PROGRESS NOTES
I saw and evaluated the patient. I personally obtained the key and critical portions of the history and physical exam or was physically present for key and critical portions performed by the resident. I reviewed the resident/fellow's documentation and discussed the patient with the resident/fellow. I agree with the resident/fellow's medical decision making as documented in the note.  Patient's lungs were clear.  Heart rate appeared to be normal.  EKG did not show any tachycardia.  Patient history of COPD/bronchitis-like symptoms.  Usually [employer] works.  Will send in the antibiotic.  Will do a chest x-ray.  May need further workup due to her history if she does not improve  Patient aware if any chest pain shortness of breath any nausea vomiting or fever headache any concerning symptoms go to ER  Follow-up in 2 weeks  Side effects medication Splane to the patient  Agree with assessment and plan    Maverick Chavira, DO

## 2025-02-19 ENCOUNTER — SOCIAL WORK (OUTPATIENT)
Dept: HEMATOLOGY/ONCOLOGY | Facility: CLINIC | Age: 70
End: 2025-02-19
Payer: MEDICARE

## 2025-02-19 ENCOUNTER — TELEPHONE (OUTPATIENT)
Dept: PRIMARY CARE | Facility: CLINIC | Age: 70
End: 2025-02-19
Payer: MEDICARE

## 2025-02-19 DIAGNOSIS — F41.9 ANXIETY: Primary | ICD-10-CM

## 2025-02-19 DIAGNOSIS — J18.9 PNEUMONIA OF RIGHT MIDDLE LOBE DUE TO INFECTIOUS ORGANISM: Primary | ICD-10-CM

## 2025-02-19 RX ORDER — AMOXICILLIN AND CLAVULANATE POTASSIUM 875; 125 MG/1; MG/1
875 TABLET, FILM COATED ORAL 2 TIMES DAILY
Qty: 14 TABLET | Refills: 0 | Status: SHIPPED | OUTPATIENT
Start: 2025-02-19 | End: 2025-02-26

## 2025-02-19 RX ORDER — ESCITALOPRAM OXALATE 10 MG/1
10 TABLET ORAL DAILY
Qty: 30 TABLET | Refills: 5 | Status: SHIPPED | OUTPATIENT
Start: 2025-02-19 | End: 2025-08-18

## 2025-02-19 RX ORDER — PROPRANOLOL HYDROCHLORIDE 10 MG/1
10 TABLET ORAL 2 TIMES DAILY PRN
Qty: 90 TABLET | Refills: 3 | Status: SHIPPED | OUTPATIENT
Start: 2025-02-19 | End: 2026-02-19

## 2025-02-19 NOTE — TELEPHONE ENCOUNTER
Called patient after receiving a message on epic chat from Malcolm Lord with social work about the patient;s increasing anxiety.   With the upcoming biopsy and current illness of pneumonia, tachycardia in office, she feels overwhelmed. Atarax was helpful but was causing dryness/epistaxis despite humidifier.   We discussed starting an SSRI and continuing propranolol instead, which she is agreeable to. We will check in frequently to evaluate symptoms, and she will contact the office with updates to her mood with the lexapro.   Joi Valle,   Family Medicine Resident, PGY2

## 2025-02-19 NOTE — PROGRESS NOTES
Referral from admin carleen Kyle indicated pt was having distress over upcoming bx. Called pt to speak to her to check in on her. Pt expressed anxiety over having to have lung nodules biopsied and how worried she was about this. Inquired what specifically was upsetting her and pt could not really explain it. Pt mentioned she was a mess. Pt has not been feeling well with pneumonia and seems to feel she really has no one to confide in or turn to. She indicated she has sons and a dtr out of state she is really missing today. Pt has been legally  from her alcoholic  who has never been there through any of her other medical issues. Provided support and listened. Checked in with pt about if she was ever given any medication for anxiety she has experienced in the past. Pt was prescribed atarax but pt has not been taking it due to it drying out her nose and mouth. Pt does not feel it helped her much at all. Pt clearly needs to speak again to her primary in an effort to find something that will help pt with anxiety. Talked pt through her feelings and concerns today and let her know the first step would be to get her anxiety under better control and then take things from there. Pt has a bx planned on March 4th coming up. Secure chatted pt's primary MD Joi Valle to ask her to reach out to pt to help her try a different medication to help pt with her anxiety. Dr. Valle wrote back  and will be calling her to help her with this. Reminded pt that Onc ANAMIKA was here and pt could come in if needed for support. Reminded pt she could also call and ask for Onc ANAMIKA.

## 2025-02-25 ENCOUNTER — OFFICE VISIT (OUTPATIENT)
Dept: PRIMARY CARE | Facility: CLINIC | Age: 70
End: 2025-02-25
Payer: MEDICARE

## 2025-02-25 ENCOUNTER — HOSPITAL ENCOUNTER (OUTPATIENT)
Dept: RADIOLOGY | Facility: CLINIC | Age: 70
Discharge: HOME | End: 2025-02-25
Payer: MEDICARE

## 2025-02-25 VITALS
OXYGEN SATURATION: 92 % | WEIGHT: 135.4 LBS | TEMPERATURE: 98.4 F | HEIGHT: 60 IN | HEART RATE: 93 BPM | SYSTOLIC BLOOD PRESSURE: 135 MMHG | BODY MASS INDEX: 26.58 KG/M2 | RESPIRATION RATE: 16 BRPM | DIASTOLIC BLOOD PRESSURE: 79 MMHG

## 2025-02-25 DIAGNOSIS — J18.9 PNEUMONIA OF RIGHT MIDDLE LOBE DUE TO INFECTIOUS ORGANISM: ICD-10-CM

## 2025-02-25 DIAGNOSIS — J18.9 PNEUMONIA OF RIGHT MIDDLE LOBE DUE TO INFECTIOUS ORGANISM: Primary | ICD-10-CM

## 2025-02-25 PROCEDURE — 3078F DIAST BP <80 MM HG: CPT

## 2025-02-25 PROCEDURE — 3075F SYST BP GE 130 - 139MM HG: CPT

## 2025-02-25 PROCEDURE — 3008F BODY MASS INDEX DOCD: CPT

## 2025-02-25 PROCEDURE — 71046 X-RAY EXAM CHEST 2 VIEWS: CPT | Performed by: RADIOLOGY

## 2025-02-25 PROCEDURE — 1159F MED LIST DOCD IN RCRD: CPT

## 2025-02-25 PROCEDURE — 71046 X-RAY EXAM CHEST 2 VIEWS: CPT

## 2025-02-25 PROCEDURE — G2211 COMPLEX E/M VISIT ADD ON: HCPCS

## 2025-02-25 PROCEDURE — 99214 OFFICE O/P EST MOD 30 MIN: CPT

## 2025-02-25 PROCEDURE — 1036F TOBACCO NON-USER: CPT

## 2025-02-25 RX ORDER — AZITHROMYCIN 250 MG/1
TABLET, FILM COATED ORAL
Qty: 6 TABLET | Refills: 0 | Status: SHIPPED | OUTPATIENT
Start: 2025-02-25 | End: 2025-03-02

## 2025-02-25 RX ORDER — AMOXICILLIN AND CLAVULANATE POTASSIUM 875; 125 MG/1; MG/1
875 TABLET, FILM COATED ORAL 2 TIMES DAILY
Qty: 6 TABLET | Refills: 0 | Status: SHIPPED | OUTPATIENT
Start: 2025-02-25 | End: 2025-02-28

## 2025-02-25 NOTE — ASSESSMENT & PLAN NOTE
Orders:    azithromycin (Zithromax) 250 mg tablet; Take 2 tablets (500 mg) by mouth once daily for 1 day, THEN 1 tablet (250 mg) once daily for 4 days. Take 2 tabs (500 mg) by mouth today, than 1 daily for 4 days..

## 2025-02-25 NOTE — PROGRESS NOTES
Subjective   Mica Vanessa is a 69 y.o. female who presents for Nasal Congestion, Cough, and Chills.    Patient is here to follow up after being diagnosed with PNA last week. She was treated with azithromycin and Zapk for right middle lobe PNA. She has had on and off fevers for 3 days with TMAX 99.5. has completed zpak and has one more day of augmentin.     Visit Vitals  /79 (BP Location: Right arm, Patient Position: Sitting, BP Cuff Size: Adult)   Pulse 93   Temp 36.9 °C (98.4 °F) (Temporal)   Resp 16      Objective   Physical Exam  Vitals reviewed.   Constitutional:       General: She is not in acute distress.     Appearance: Normal appearance. She is not toxic-appearing.   HENT:      Head: Normocephalic and atraumatic.      Nose: Nose normal.   Eyes:      Extraocular Movements: Extraocular movements intact.   Cardiovascular:      Rate and Rhythm: Normal rate and regular rhythm.      Heart sounds: No murmur heard.     No friction rub. No gallop.   Pulmonary:      Effort: Pulmonary effort is normal. No respiratory distress.      Breath sounds: Normal breath sounds. No wheezing, rhonchi or rales.   Skin:     General: Skin is warm and dry.   Neurological:      General: No focal deficit present.      Mental Status: She is alert.   Psychiatric:         Mood and Affect: Mood normal.         Behavior: Behavior normal.            Assessment/Plan      Assessment & Plan  Pneumonia of right middle lobe due to infectious organism  Patient to follow-up after treatment for pneumonia.  She is currently finishing 7-day course of Augmentin and has already finished her Z-Naveen.  She does overall feel like her symptoms are improving and denies any shortness of breath, chest pain, worsening cough.  She does feel some fatigue and chills at times still, but is afebrile in office and otherwise hemodynamically stable.  Pulse ox was 92-93% during her visit today and when she was walked around the office.  We will repeat chest x-ray to  "monitor for any changes and have also extended course of antibiotics to 10-day course of Augmentin and additional Z-Naveen.  Based on x-ray will determine if need for change in therapy is appropriate.  Orders:    XR chest 2 views; Future    amoxicillin-pot clavulanate (Augmentin) 875-125 mg tablet; Take 1 tablet (875 mg) by mouth 2 times a day for 3 days.    azithromycin (Zithromax) 250 mg tablet; Take 2 tablets (500 mg) by mouth once daily for 1 day, THEN 1 tablet (250 mg) once daily for 4 days. Take 2 tabs (500 mg) by mouth today, than 1 daily for 4 days..           This note was partially created using voice recognition software and is inherently subject to errors including those of syntax and \"sound-alike\" substitutions which may escape proofreading. In such instances, original meaning may be extrapolated by contextual derivation.      "

## 2025-02-26 NOTE — PROGRESS NOTES
I saw and evaluated the patient. I personally obtained the key and critical portions of the history and physical exam or was physically present for key and critical portions performed by the resident/fellow. I reviewed the resident/fellow's documentation and discussed the patient with the resident/fellow. I agree with the resident/fellow's medical decision making as documented in the note.    Merlin Dillon, DO

## 2025-03-04 ENCOUNTER — HOSPITAL ENCOUNTER (OUTPATIENT)
Dept: RADIOLOGY | Facility: HOSPITAL | Age: 70
Discharge: HOME | End: 2025-03-04
Payer: MEDICARE

## 2025-03-04 VITALS
BODY MASS INDEX: 25.91 KG/M2 | RESPIRATION RATE: 16 BRPM | TEMPERATURE: 97.9 F | OXYGEN SATURATION: 95 % | HEART RATE: 61 BPM | SYSTOLIC BLOOD PRESSURE: 112 MMHG | WEIGHT: 132 LBS | DIASTOLIC BLOOD PRESSURE: 65 MMHG | HEIGHT: 60 IN

## 2025-03-04 DIAGNOSIS — R91.8 LUNG NODULES: ICD-10-CM

## 2025-03-04 DIAGNOSIS — C50.919 MALIGNANT NEOPLASM OF FEMALE BREAST, UNSPECIFIED ESTROGEN RECEPTOR STATUS, UNSPECIFIED LATERALITY, UNSPECIFIED SITE OF BREAST: ICD-10-CM

## 2025-03-04 DIAGNOSIS — C54.1 MALIGNANT NEOPLASM OF ENDOMETRIUM (MULTI): ICD-10-CM

## 2025-03-04 LAB
ERYTHROCYTE [DISTWIDTH] IN BLOOD BY AUTOMATED COUNT: 11.9 % (ref 11.5–14.5)
HCT VFR BLD AUTO: 41.1 % (ref 36–46)
HGB BLD-MCNC: 14 G/DL (ref 12–16)
INR PPP: 1.1 (ref 0.9–1.1)
MCH RBC QN AUTO: 29 PG (ref 26–34)
MCHC RBC AUTO-ENTMCNC: 34.1 G/DL (ref 32–36)
MCV RBC AUTO: 85 FL (ref 80–100)
NRBC BLD-RTO: 0 /100 WBCS (ref 0–0)
PLATELET # BLD AUTO: 298 X10*3/UL (ref 150–450)
PROTHROMBIN TIME: 11.9 SECONDS (ref 9.8–12.4)
RBC # BLD AUTO: 4.83 X10*6/UL (ref 4–5.2)
WBC # BLD AUTO: 4.1 X10*3/UL (ref 4.4–11.3)

## 2025-03-04 PROCEDURE — 85027 COMPLETE CBC AUTOMATED: CPT | Performed by: RADIOLOGY

## 2025-03-04 PROCEDURE — 99152 MOD SED SAME PHYS/QHP 5/>YRS: CPT

## 2025-03-04 PROCEDURE — 2500000005 HC RX 250 GENERAL PHARMACY W/O HCPCS: Performed by: RADIOLOGY

## 2025-03-04 PROCEDURE — 99152 MOD SED SAME PHYS/QHP 5/>YRS: CPT | Performed by: RADIOLOGY

## 2025-03-04 PROCEDURE — 2720000007 HC OR 272 NO HCPCS

## 2025-03-04 PROCEDURE — 7100000010 HC PHASE TWO TIME - EACH INCREMENTAL 1 MINUTE

## 2025-03-04 PROCEDURE — 32408 CORE NDL BX LNG/MED PERQ: CPT

## 2025-03-04 PROCEDURE — 2500000004 HC RX 250 GENERAL PHARMACY W/ HCPCS (ALT 636 FOR OP/ED): Performed by: RADIOLOGY

## 2025-03-04 PROCEDURE — 85610 PROTHROMBIN TIME: CPT | Performed by: RADIOLOGY

## 2025-03-04 PROCEDURE — 71045 X-RAY EXAM CHEST 1 VIEW: CPT

## 2025-03-04 PROCEDURE — 7100000009 HC PHASE TWO TIME - INITIAL BASE CHARGE

## 2025-03-04 PROCEDURE — 36415 COLL VENOUS BLD VENIPUNCTURE: CPT | Performed by: RADIOLOGY

## 2025-03-04 RX ORDER — FENTANYL CITRATE 50 UG/ML
INJECTION, SOLUTION INTRAMUSCULAR; INTRAVENOUS
Status: COMPLETED | OUTPATIENT
Start: 2025-03-04 | End: 2025-03-04

## 2025-03-04 RX ORDER — MIDAZOLAM HYDROCHLORIDE 1 MG/ML
INJECTION, SOLUTION INTRAMUSCULAR; INTRAVENOUS
Status: COMPLETED | OUTPATIENT
Start: 2025-03-04 | End: 2025-03-04

## 2025-03-04 RX ORDER — LIDOCAINE HYDROCHLORIDE 10 MG/ML
INJECTION, SOLUTION EPIDURAL; INFILTRATION; INTRACAUDAL; PERINEURAL
Status: COMPLETED | OUTPATIENT
Start: 2025-03-04 | End: 2025-03-04

## 2025-03-04 RX ADMIN — LIDOCAINE HYDROCHLORIDE 3 ML: 10 INJECTION, SOLUTION EPIDURAL; INFILTRATION; INTRACAUDAL; PERINEURAL at 11:39

## 2025-03-04 RX ADMIN — Medication 3 L/MIN: at 11:22

## 2025-03-04 RX ADMIN — FENTANYL CITRATE 50 MCG: 50 INJECTION, SOLUTION INTRAMUSCULAR; INTRAVENOUS at 11:26

## 2025-03-04 RX ADMIN — MIDAZOLAM 1 MG: 1 INJECTION INTRAMUSCULAR; INTRAVENOUS at 11:26

## 2025-03-04 ASSESSMENT — PAIN SCALES - GENERAL

## 2025-03-04 ASSESSMENT — PAIN - FUNCTIONAL ASSESSMENT
PAIN_FUNCTIONAL_ASSESSMENT: 0-10

## 2025-03-04 NOTE — PROCEDURES
Interventional Radiology Brief Postprocedure Note    Attending: Molly Marie MD    Assistant:   Staff Role   Willard Herron, PENG Other - Providing Care   Salazar Mendez Radiology Technologist   Molly Marie MD Radiologist   Mima Calixto, RN Radiology Nurse       Diagnosis:   1. Malignant neoplasm of endometrium (Multi)  CT guided percutaneous biopsy lung    CT guided percutaneous biopsy lung    Surgical Pathology Exam    Surgical Pathology Exam      2. Malignant neoplasm of female breast, unspecified estrogen receptor status, unspecified laterality, unspecified site of breast  CT guided percutaneous biopsy lung    CT guided percutaneous biopsy lung    Surgical Pathology Exam    Surgical Pathology Exam      3. Lung nodules  CT guided percutaneous biopsy lung    CT guided percutaneous biopsy lung    Surgical Pathology Exam    Surgical Pathology Exam          Description of procedure: CT guided percutaneous biopsy lung RLL 20 G x 2    Timeout:  Yes    Procedure Area: Procedure Area     Anesthesia:   Conscious Sedation    Complications: None    Estimated Blood Loss: minimal    Medications (Filter: Administrations occurring from 1113 to 1154 on 03/04/25) As of 03/04/25 1154      oxygen (O2) therapy (L/min) Total volume:  Not documented* Dosing weight:  61.4   *Total volume has not been documented. View each administration to see the amount administered.     Date/Time Rate/Dose/Volume Action       03/04/25  1122 3 L/min - 180,000 mL/hr Start               fentaNYL PF (Sublimaze) injection (mcg) Total dose:  50 mcg      Date/Time Rate/Dose/Volume Action       03/04/25  1126 50 mcg Given               midazolam (Versed) injection (mg) Total dose:  1 mg      Date/Time Rate/Dose/Volume Action       03/04/25  1126 1 mg Given               lidocaine PF (Xylocaine) 10 mg/mL (1 %) injection (mL) Total volume:  3 mL      Date/Time Rate/Dose/Volume Action       03/04/25  1139 3 mL Given                   ID Type  Source Tests Collected by Time   1 : right lower lobe Tissue LUNG BIOPSY RIGHT (SPECIFY SITE) SURGICAL PATHOLOGY EXAM Molly Marie MD 3/4/2025 1150         See detailed result report with images in PACS.    The patient tolerated the procedure well without incident or complication and is in stable condition.

## 2025-03-04 NOTE — Clinical Note
2 core samples obtained and biocentury placed for closure. Bandaid dressing placed. No active bleeding

## 2025-03-05 ENCOUNTER — TELEPHONE (OUTPATIENT)
Dept: PRIMARY CARE | Facility: CLINIC | Age: 70
End: 2025-03-05
Payer: MEDICARE

## 2025-03-05 DIAGNOSIS — R11.0 NAUSEA: Primary | ICD-10-CM

## 2025-03-05 RX ORDER — ONDANSETRON 4 MG/1
4 TABLET, ORALLY DISINTEGRATING ORAL EVERY 8 HOURS PRN
Qty: 20 TABLET | Refills: 0 | Status: SHIPPED | OUTPATIENT
Start: 2025-03-05 | End: 2025-03-25

## 2025-03-05 NOTE — PROGRESS NOTES
Called patient to address nausea that she is experiencing initially in the setting of pneumonia, however worse after taking 10 days of Augmentin.  It is overall improving, however she is requesting something for nausea to help her eat more and get her strength back.  We will do a short course of Zofran and she is encouraged to follow-up in clinic if she does not improve or starts to worsen.  She expresses understanding and we will follow-up as needed.  Joi Valle, DO

## 2025-03-06 DIAGNOSIS — F41.9 ANXIETY: ICD-10-CM

## 2025-03-06 RX ORDER — HYDROXYZINE HYDROCHLORIDE 25 MG/1
25 TABLET, FILM COATED ORAL EVERY 8 HOURS PRN
Qty: 90 TABLET | Refills: 2 | Status: SHIPPED | OUTPATIENT
Start: 2025-03-06

## 2025-03-10 ENCOUNTER — APPOINTMENT (OUTPATIENT)
Dept: HEMATOLOGY/ONCOLOGY | Facility: CLINIC | Age: 70
End: 2025-03-10
Payer: MEDICARE

## 2025-03-13 LAB
LAB AP ASR DISCLAIMER: NORMAL
LABORATORY COMMENT REPORT: NORMAL
PATH REPORT.COMMENTS IMP SPEC: NORMAL
PATH REPORT.FINAL DX SPEC: NORMAL
PATH REPORT.GROSS SPEC: NORMAL
PATH REPORT.TOTAL CANCER: NORMAL

## 2025-03-17 ENCOUNTER — OFFICE VISIT (OUTPATIENT)
Dept: HEMATOLOGY/ONCOLOGY | Facility: CLINIC | Age: 70
End: 2025-03-17
Payer: MEDICARE

## 2025-03-17 ENCOUNTER — LAB (OUTPATIENT)
Dept: LAB | Facility: CLINIC | Age: 70
End: 2025-03-17
Payer: MEDICARE

## 2025-03-17 VITALS
WEIGHT: 132.72 LBS | SYSTOLIC BLOOD PRESSURE: 163 MMHG | HEART RATE: 67 BPM | TEMPERATURE: 96.8 F | RESPIRATION RATE: 18 BRPM | BODY MASS INDEX: 25.92 KG/M2 | OXYGEN SATURATION: 95 % | DIASTOLIC BLOOD PRESSURE: 73 MMHG

## 2025-03-17 DIAGNOSIS — C54.1 MALIGNANT NEOPLASM OF ENDOMETRIUM (MULTI): ICD-10-CM

## 2025-03-17 DIAGNOSIS — C50.919 MALIGNANT NEOPLASM OF FEMALE BREAST, UNSPECIFIED ESTROGEN RECEPTOR STATUS, UNSPECIFIED LATERALITY, UNSPECIFIED SITE OF BREAST: ICD-10-CM

## 2025-03-17 DIAGNOSIS — D69.6 THROMBOCYTOPENIA (CMS-HCC): ICD-10-CM

## 2025-03-17 LAB
ALBUMIN SERPL BCP-MCNC: 4.4 G/DL (ref 3.4–5)
ALP SERPL-CCNC: 68 U/L (ref 33–136)
ALT SERPL W P-5'-P-CCNC: 10 U/L (ref 7–45)
ANION GAP SERPL CALC-SCNC: 11 MMOL/L (ref 10–20)
AST SERPL W P-5'-P-CCNC: 12 U/L (ref 9–39)
BASOPHILS # BLD AUTO: 0.02 X10*3/UL (ref 0–0.1)
BASOPHILS NFR BLD AUTO: 0.6 %
BILIRUB SERPL-MCNC: 0.6 MG/DL (ref 0–1.2)
BUN SERPL-MCNC: 17 MG/DL (ref 6–23)
CALCIUM SERPL-MCNC: 9.4 MG/DL (ref 8.6–10.3)
CANCER AG27-29 SERPL-ACNC: 50.3 U/ML (ref 0–38.6)
CEA SERPL-MCNC: 1.3 UG/L
CHLORIDE SERPL-SCNC: 103 MMOL/L (ref 98–107)
CO2 SERPL-SCNC: 28 MMOL/L (ref 21–32)
CREAT SERPL-MCNC: 0.6 MG/DL (ref 0.5–1.05)
EGFRCR SERPLBLD CKD-EPI 2021: >90 ML/MIN/1.73M*2
EOSINOPHIL # BLD AUTO: 0.09 X10*3/UL (ref 0–0.7)
EOSINOPHIL NFR BLD AUTO: 2.7 %
ERYTHROCYTE [DISTWIDTH] IN BLOOD BY AUTOMATED COUNT: 12.7 % (ref 11.5–14.5)
GLUCOSE SERPL-MCNC: 81 MG/DL (ref 74–99)
HCT VFR BLD AUTO: 42.5 % (ref 36–46)
HGB BLD-MCNC: 13.9 G/DL (ref 12–16)
IMM GRANULOCYTES # BLD AUTO: 0.01 X10*3/UL (ref 0–0.7)
IMM GRANULOCYTES NFR BLD AUTO: 0.3 % (ref 0–0.9)
LYMPHOCYTES # BLD AUTO: 0.67 X10*3/UL (ref 1.2–4.8)
LYMPHOCYTES NFR BLD AUTO: 20.1 %
MCH RBC QN AUTO: 29.3 PG (ref 26–34)
MCHC RBC AUTO-ENTMCNC: 32.7 G/DL (ref 32–36)
MCV RBC AUTO: 90 FL (ref 80–100)
MONOCYTES # BLD AUTO: 0.29 X10*3/UL (ref 0.1–1)
MONOCYTES NFR BLD AUTO: 8.7 %
NEUTROPHILS # BLD AUTO: 2.26 X10*3/UL (ref 1.2–7.7)
NEUTROPHILS NFR BLD AUTO: 67.6 %
NRBC BLD-RTO: 0 /100 WBCS (ref 0–0)
PLATELET # BLD AUTO: 278 X10*3/UL (ref 150–450)
POTASSIUM SERPL-SCNC: 4 MMOL/L (ref 3.5–5.3)
PROT SERPL-MCNC: 6.8 G/DL (ref 6.4–8.2)
RBC # BLD AUTO: 4.74 X10*6/UL (ref 4–5.2)
SODIUM SERPL-SCNC: 138 MMOL/L (ref 136–145)
WBC # BLD AUTO: 3.3 X10*3/UL (ref 4.4–11.3)

## 2025-03-17 PROCEDURE — 82378 CARCINOEMBRYONIC ANTIGEN: CPT | Mod: PARLAB

## 2025-03-17 PROCEDURE — 1159F MED LIST DOCD IN RCRD: CPT | Performed by: INTERNAL MEDICINE

## 2025-03-17 PROCEDURE — 1126F AMNT PAIN NOTED NONE PRSNT: CPT | Performed by: INTERNAL MEDICINE

## 2025-03-17 PROCEDURE — 3077F SYST BP >= 140 MM HG: CPT | Performed by: INTERNAL MEDICINE

## 2025-03-17 PROCEDURE — 86300 IMMUNOASSAY TUMOR CA 15-3: CPT | Mod: PARLAB

## 2025-03-17 PROCEDURE — 99215 OFFICE O/P EST HI 40 MIN: CPT | Performed by: INTERNAL MEDICINE

## 2025-03-17 PROCEDURE — 84075 ASSAY ALKALINE PHOSPHATASE: CPT

## 2025-03-17 PROCEDURE — 36415 COLL VENOUS BLD VENIPUNCTURE: CPT

## 2025-03-17 PROCEDURE — 3078F DIAST BP <80 MM HG: CPT | Performed by: INTERNAL MEDICINE

## 2025-03-17 PROCEDURE — 85025 COMPLETE CBC W/AUTO DIFF WBC: CPT

## 2025-03-17 ASSESSMENT — PAIN SCALES - GENERAL: PAINLEVEL_OUTOF10: 0-NO PAIN

## 2025-03-17 NOTE — PROGRESS NOTES
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 Intermediate score on Oncotype DX. Systemic adjuvant chemotherapy. She was on tamoxifen from October 2007 in October 2000 and and switched to Arimidex original diagnosis in May 2007.     History of lobular carcinoma in situ of left breast in May 2003.     The patient's 32-year-old daughter was diagnosed as having ER, FL, HER negative breast cancer in July 2016. She received neoadjuvant chemotherapy with CA, followed by Taxol. She was placed on maintenance Xeloda for 6 months at [facility]. She is  BRCA negative.     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       10/6/23 EMB w/ uterine cancer, mesonephric type  10/21/23 Total laparoscopic hysterectomy bilateral salpingo oophorectomy, Bilateral sentinel lymph node injection and mapping, Left pelvic sentinel lymph node dissection and right complete pelvic lymphadenectomy, Cystourethroscopy.  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 MMRp, P53 alison type. TB recommendations for radiation oncology consult, genetics referral, consider systemic therapy  Adjuvant pelvic RT  7/2024 - new pulmonary nodules, all less than 1cm    3/4/25  LUNG, RIGHT LOWER LOBE, BIOPSY:  - Adenocarcinoma, most consistent with metastatic carcinoma of mllerian origin, involving lung parenchyma. See note.              History of Present  Illness:      ID Statement:    JANET MENG is a 67 year old Female        Chief Complaint: follow-up   Interval History:    The patient had come  for a follow-up on 1/24/23 regarding a recent fall that resulted in broken ribs and prompted imaging studies that revealed pulmonary nodules.  The patient has a history of  T1N0M0 Moderate to fully differentiated infiltrating ductal carcinoma. She remains s/p lumpectomy and axillary lymph node resection. S/p systemic chemotherapy. Was  on Tamoxifen from 10/2007 till 10/2010 then switched to Arimidex 1 mg PO QD.  She reports feeling well and denies any new symptoms other than pain related to her breaking her ribs after a fall.  In  ER CT scan confirmed the fractured ribs but revealed lung nodules which are new.  The patient has occasional cough but denies shortness of breath chest pain hemoptysis.      The patient had come for follow-up on July 26, 2023 regarding history of T1 N0 M0 moderately poorly differentiated infiltrating ductal carcinoma.  S/p lumpectomy axillary lymph node dissection.  S/p systemic chemotherapy.  Received tamoxifen from October 2007 through October 2010 then switched to Arimidex 1 mg p.o. daily doing well denied any symptoms.   The patient had come for follow-up on October 15, 2024 regarding history of T1 N0 M0 moderately poorly differentiated infiltrating ductal carcinoma.  S/p lumpectomy axillary lymph node dissection.  S/p systemic chemotherapy.  Received tamoxifen from October 2007 through October 2010 then switched to Arimidex 1 mg p.o. daily.  The patient presented with postmenopausal bleeding was evaluated further and noted to have endometrial carcinoma mesonephric type.  Underwent DEAN/BSO on October 21, 2023.  Stage Ib, positive LVSI, MMR P, p53 wild-type.  Did receive radiation therapy at [facility].  Was referred for genetic counseling.  CT scan of the chest abdomen pelvis on July 2024 revealed previously existing nodules which are  unchanged but new pulmonary nodules.  A mammogram also revealed architectural distortion at 12 o'clock position superior to the nipple.  The patient had a port during previous evaluation and possible scar.     The patient had come for follow-up on 2/10/25 regarding history of T1 N0 M0 moderately poorly differentiated infiltrating ductal carcinoma.  S/p lumpectomy axillary lymph node dissection.  S/p systemic chemotherapy.  Received tamoxifen from October 2007 through October 2010 then switched to Arimidex 1 mg p.o. daily.  The patient presented with postmenopausal bleeding was evaluated further and noted to have endometrial carcinoma mesonephric type.  Underwent DEAN/BSO on October 21, 2023.  Stage Ib, positive LVSI, MMR P, p53 wild-type.  Did receive radiation therapy at [facility].  Was referred for genetic counseling.  CT scan of the chest abdomen pelvis on July 2024 revealed previously existing nodules which are unchanged but new pulmonary nodules.  A mammogram also revealed architectural distortion at 12 o'clock position superior to the nipple.  The patient had a port during previous evaluation and possible scar.    The patient had come for follow-up on March 17, 2025 regarding history of breast cancer, carcinoma vulva, history of uterine cancer status post DEAN/BSO and radiation therapy and now lung nodules.  Past medical history  1. Breast cancer  2. 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 Lumpectomy    Colonoscopy/EGD on February 6, 2025.     Review of Systems:   ·  System Review All other systems have been reviewed and are negative for complaint.      · Constitutional NEGATIVE: Fever, Chills, Anorexia, Weight Loss, Malaise      · Eyes NEGATIVE: Blurry Vision, Drainage, Diploplia, Redness, Vision Loss/ Change      · ENMT NEGATIVE: Nasal Discharge, Nasal Congestion, Ear Pain, Mouth Pain, Throat Pain      · Respiratory NEGATIVE: Dry Cough, Productive Cough,  Hemoptysis, Wheezing, Shortness of Breath      · Cardiology NEGATIVE: Chest Pain, Dyspnea on Exertion, Orthopnea, Palpitations, Syncope      · Gastrointestinal NEGATIVE: Abdominal Pain, Constipation, Diarrhea, Nausea, Vomiting      · Genitourinary NEGATIVE: Discharge, Dysuria, Flank Pain, Frequency, Hematuria      · Musculoskeletal NEGATIVE: Decreased ROM, Pain, Swelling, Stiffness, Weakness      · Neurological NEGATIVE: Dizziness, Confusion, Headache, Seizures, Syncope      · Psychiatric NEGATIVE: Mood Changes, Anxiety, Hallucinations, Sleep Changes, Suicidal Ideas      · Skin NEGATIVE: Mass, Pain, Pruritus, Rash, Ulcer      · Endocrine NEGATIVE: Heat Intolerance, Cold Intolerance, Sweat, Polyuria, Thirst      · Hematologic/Lymph NEGATIVE: Anemia, Bruising, Easy Bleeding, Night Sweats, Petechiae      · Allergic/Immunologic NEGATIVE: Anaphylaxis, Itchy/ Teary Eyes, Itching, Sneezing, Swelling      · Breast NEGATIVE: Pain, Mass, Discharge, Nipple Itching, Gynecomastia         Allergies and Intolerances:       Allergies:         No Known Allergies: Active     Outpatient Medication Profile:  * Patient Currently Takes Medications as of 26-Jul-2023 11:01 documented in Structured Notes         cefuroxime 250 mg oral tablet : Last Dose Taken:  , 1 tab(s) orally 2 times a day , Start Date: 26-Oct-2022         gemfibrozil 600 mg oral tablet: Last Dose Taken:  , 1 tab(s) orally 2 times  a day         lisinopril 10 mg oral tablet: Last Dose Taken:  , 1 tab(s) orally once  a day         Daily Multiple Vitamins oral tablet: Last Dose Taken:  , 1 tab(s) orally  once a day         Flonase 50 mcg/inh nasal spray: Last Dose Taken:  , 1 spray(s) nasal  once a day         esomeprazole 40 mg oral delayed release capsule: Last Dose Taken:  ,  1 cap(s) orally once a day             Medical History:         Breast cancer: ICD-10: C50.919, Status: Active       Surg History:         History of lumpectomy: ICD-10: Z98.890, Status: Active      Family History: No Family History items are recorded  in the problem list.      Social History:   Social Substance History:  ·  Social History denies smoking, alcohol and drug use   ·  Smoking Status never smoker   ·  Tobacco Use denies   ·  Alcohol Use denies   ·  Drug Use denies   ·  Additional History      PPD for 30years(1)           Vitals and Measurements:   Vitals: Temp: 36.8  HR: 66  RR: 18  BP: 129/67  SPO2%:   96   Measurements: HT(cm): 156.5  WT(kg): 65.6  BSA: 1.68   BMI:  26.7      Physical Exam:      Constitutional: Well developed, awake/alert/oriented  x3, no distress, alert and cooperative   Eyes: PERRL, EOMI, clear sclera   ENMT: mucous membranes moist, no apparent injury,  no lesions seen   Head/Neck: Neck supple, no apparent injury, thyroid  without mass or tenderness, No JVD, trachea midline, no bruits   Respiratory/Thorax: Patent airways, CTAB, normal  breath sounds with good chest expansion, thorax symmetric   Cardiovascular: Regular, rate and rhythm, no murmurs,  2+ equal pulses of the extremities, normal S 1and S 2   Gastrointestinal: Nondistended, soft, non-tender,  no rebound tenderness or guarding, no masses palpable, no organomegaly, +BS, no bruits   Genitourinary: No Discharge, vesicles or other abnormalities   Musculoskeletal: ROM intact, no joint swelling, normal  strength   Extremities: normal extremities, no cyanosis edema,  contusions or wounds, no clubbing   Neurological: alert and oriented x3, intact senses,  motor, response and reflexes, normal strength   Breast: No masses, tenderness, no discharge or discoloration   Lymphatic: No significant lymphadenopathy   Psychological: Appropriate mood and behavior   Skin: Warm and dry, no lesions, no rashes         Lab Results:      Radiology Result:     ·  Results     No Results have been selected.  Please select Results from the Available Results list before marking as Reviewed.     Assessment and Plan:       1. T1N0M0 Moderate to  fully differentiated infiltrating ductal carcinoma, ER/MO positive HER2 negative  S/p lumpectomy and axillary lymph node resection. S/p systemic chemotherapy. Was on Tamoxifen from 10/2007 till 10/2010 then switched to Arimidex. Original diagnosis in 2007.      The patient had come for a follow up 1/24/23. Physical exam within normal limits. Reviewed lab data with the patient. CBC on 10/05/2022 revealed WBC 6.1, HGB 14.0, HCT 43.2, , Neut 3.71, , K 4.5, , BUN 16, CREAT 0.60. CEA at 1.1 anf  CA 27-29 was at 28.5 on a reference range of 0-38.6. Mammogram on 07/01/2021 revealed benign findings. The patient recently sustained a fall that resulted in broken ribs and prompted imaging studies that revealed pulmonary nodules.  I recommended that the patient undergo a PET scan with tumor markers and reassess..  PET scan was denied by insurance company.  Options include do nothing and repeat CT scans in 3 months or sooner  if symptomatic.  Refer to pulmonary services for consideration of bronchoscopy and biopsy or refer to CT surgery for open biopsy.  After thinking through all the options the patient decided to come after 3 months.  CT scan of chest abdomen pelvis in December 27, 2022 revealed a 3 mm nodule in the right lower lobe which is unchanged.  There are thyroid nodules.  Overall these are stable findings.  Abdomen and pelvis were negative.  The patient is pleased we will repeat studies in 6 months and if remains stable  will follow clinically after that the patient is agreeable.     The patient had come for follow-up on July 26, 2023 regarding history of T1 N0 M0 infiltrating ductal carcinoma of the left breast s/p lumpectomy radiation therapy axillary lymph node dissection s/p systemic chemotherapy.  Received tamoxifen, October 2000 7 October 2010 then switched to Arimidex 1 mg p.o. daily.  A mammogram on July 21, 2023 revealed benign findings.  CT scan of chest abdomen pelvis done as a  follow-up for pulmonary nodules showed mucous plugging in upper lobes and unchanged rest  of the nodules.  No evidence of local or distal recurrence.  The patient is pleased.  Return in 1 year.    The patient had come for follow-up on October 15, 2024  regarding history of T1 N0 M0 moderately poorly differentiated infiltrating ductal carcinoma.  S/p lumpectomy axillary lymph node dissection.  S/p systemic chemotherapy.  Received tamoxifen from October 2007 through October 2010 then switched to Arimidex 1 mg p.o. daily.  The patient presented with postmenopausal bleeding was evaluated further and noted to have endometrial carcinoma mesonephric type.  Underwent DEAN/BSO on October 21, 2023.  Stage Ib, positive LVSI, MMR P, p53 wild-type.  Did receive radiation therapy at [facility].  Was referred for genetic counseling.  CT scan of the chest abdomen pelvis on July 2024 revealed previously existing nodules which are unchanged but new pulmonary nodules.  A mammogram also revealed architectural distortion at 12 o'clock position superior to the nipple.  The patient had a port during previous evaluation and possible scar.  I have recommended ultrasound of left breast.  Return in 4 weeks.  CT scan of chest on October 14, 2024 revealedBilateral multiple solid pulmonary parenchymal nodules, many of  which have increased in size from 07/01/2024.  2. No mediastinal lymphadenopathy. No pleural effusion..  I have recommended a PET scan as well as a CT-guided biopsy of the most accessible lung nodule which is increasing in size.  Return in 3 weeks.     The patient had come for follow-up on 2/10/25 regarding history of T1 N0 M0 moderately poorly differentiated infiltrating ductal carcinoma.  S/p lumpectomy axillary lymph node dissection.  S/p systemic chemotherapy.  Received tamoxifen from October 2007 through October 2010 then switched to Arimidex 1 mg p.o. daily.  The patient presented with postmenopausal bleeding was evaluated  further and noted to have endometrial carcinoma mesonephric type.  Underwent DEAN/BSO on October 21, 2023.  Stage Ib, positive LVSI, MMR P, p53 wild-type.  Did receive radiation therapy at [facility].  Was referred for genetic counseling.  CT scan of the chest abdomen pelvis on July 2024 revealed previously existing nodules which are unchanged but new pulmonary nodules.  A mammogram also revealed architectural distortion at 12 o'clock position superior to the nipple.  The patient had a port during previous evaluation and possible scar.  CT scan of chest on February 3, 2025 revealed increasing number and size of lung nodules.  I have recommended CT-guided lung biopsy.    The patient had come for follow-up on March 17, 2025 regarding history of breast cancer, carcinoma vulva, history of uterine cancer status post DEAN/BSO and radiation therapy and now lung nodules.  CT-guided lung biopsy right lower lobe lung nodule on March 4, 2025 revealed findings consistent with adenocarcinoma/metastatic/most likely from uterine primary.  Referred to Dr. Palacio.     2. 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  Khan's esophagus on upper endoscopy in September 2021.  Follow-up with GI services.

## 2025-03-18 ENCOUNTER — TELEMEDICINE (OUTPATIENT)
Dept: GYNECOLOGIC ONCOLOGY | Facility: HOSPITAL | Age: 70
End: 2025-03-18
Payer: MEDICARE

## 2025-03-18 DIAGNOSIS — C54.1 MALIGNANT NEOPLASM OF ENDOMETRIUM (MULTI): Primary | ICD-10-CM

## 2025-03-18 PROCEDURE — 99212 OFFICE O/P EST SF 10 MIN: CPT | Performed by: OBSTETRICS & GYNECOLOGY

## 2025-03-18 PROCEDURE — 1160F RVW MEDS BY RX/DR IN RCRD: CPT | Performed by: OBSTETRICS & GYNECOLOGY

## 2025-03-18 PROCEDURE — 1159F MED LIST DOCD IN RCRD: CPT | Performed by: OBSTETRICS & GYNECOLOGY

## 2025-03-18 NOTE — PROGRESS NOTES
Consent:  Verbal consent was requested and obtained from patient on this date for a telehealth visit.    Patient ID: Mica Vanessa is a 69 y.o. female.  Referring Physician: No referring provider defined for this encounter.  Primary Care Provider: Joi Valle,     Subjective    Doing ok    Saw Dr. Plata yesterday.      Eating well  No new symptoms          Objective    BSA: There is no height or weight on file to calculate BSA.  There were no vitals taken for this visit.     Physical Exam    Performance Status:  Symptomatic; fully ambulatory    Assessment/Plan    Oncology History Overview Note   Cancer history:  10/6/23 EMB w/ uterine cancer, mesonephric type  10/21/23 Total laparoscopic hysterectomy bilateral salpingo oophorectomy, Bilateral sentinel lymph node injection and mapping, Left pelvic sentinel lymph node dissection and right complete pelvic lymphadenectomy, Cystourethroscopy.  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 MMRp, P53 alison type. TB recommendations for radiation oncology consult, genetics referral, consider systemic therapy  Adjuvant pelvic RT  7/2024 - new pulmonary nodules, all less than 1cm  10/2024 CT chest stable  3/2025 biopsy of lung nodule - c/w metastatic uterine cancer     Malignant neoplasm of endometrium (Multi)   11/10/2023 Initial Diagnosis    Malignant neoplasm of endometrium (Multi)       Cancer Staging   No matching staging information was found for the patient.       Problem List Items Addressed This Visit             ICD-10-CM    Malignant neoplasm of endometrium (Multi) - Primary C54.1    Relevant Orders    CT abdomen pelvis w IV contrast        Treatment Plans       No treatment plans exist        Reviewed natural history and prognosis of recurrent endometrial cancer  Discussed treatment options.  Reviewed recommendation for carbo/Taxol/pembrolizumab.  Expected side effects and toxicities were reviewed.  Will obtain CT  abdomen/pelvis to complete metastatic workup  Treatment at [facility].    I will see her back for chemotherapy consent cycle #1    I spent 15 minutes in telephone discussion with the patient

## 2025-03-20 ENCOUNTER — ONCOLOGY MEDICATION OUTREACH (OUTPATIENT)
Dept: GYNECOLOGIC ONCOLOGY | Facility: HOSPITAL | Age: 70
End: 2025-03-20
Payer: MEDICARE

## 2025-03-20 DIAGNOSIS — C54.1 MALIGNANT NEOPLASM OF ENDOMETRIUM (MULTI): Primary | ICD-10-CM

## 2025-03-20 DIAGNOSIS — C54.1 MALIGNANT NEOPLASM OF ENDOMETRIUM (MULTI): ICD-10-CM

## 2025-03-21 ENCOUNTER — TELEPHONE (OUTPATIENT)
Dept: GYNECOLOGIC ONCOLOGY | Facility: HOSPITAL | Age: 70
End: 2025-03-21
Payer: MEDICARE

## 2025-03-21 RX ORDER — PROCHLORPERAZINE MALEATE 10 MG
10 TABLET ORAL EVERY 6 HOURS PRN
Qty: 30 TABLET | Refills: 5 | Status: SHIPPED | OUTPATIENT
Start: 2025-03-21

## 2025-03-21 RX ORDER — ONDANSETRON HYDROCHLORIDE 8 MG/1
8 TABLET, FILM COATED ORAL EVERY 8 HOURS PRN
Qty: 30 TABLET | Refills: 5 | Status: SHIPPED | OUTPATIENT
Start: 2025-03-21

## 2025-03-21 RX ORDER — MAGNESIUM GLUCONATE 27 MG(500)
54 TABLET ORAL 2 TIMES DAILY
Qty: 120 TABLET | Refills: 5 | Status: SHIPPED | OUTPATIENT
Start: 2025-03-21

## 2025-03-21 RX ORDER — DEXAMETHASONE 4 MG/1
TABLET ORAL
Qty: 66 TABLET | Refills: 0 | Status: SHIPPED | OUTPATIENT
Start: 2025-03-21

## 2025-03-21 NOTE — TELEPHONE ENCOUNTER
Called patient to discuss chemo teaching, upcoming first infusion and clinic appointment etc. Left detailed VM and will send out chemo folder. Also left phone number for call back.

## 2025-03-21 NOTE — PROGRESS NOTES
ONCOLOGY CLINICAL PHARMACY NOTE     Subjective  Mica Vanessa is a 69 y.o. female with endometrial cancer,calledfor education.        Treatment history  Treatment Details   Treatment goal [No plan goal]   Plan Name Pembrolizumab + PACLitaxel / CARBOplatin, 21 Day Cycles followed by Pembrolizumab, 42 Day Cycles - Gyn    Status Active   Start Date 3/31/2025 (Planned)   End Date 2/1/2027 (Planned)   Provider Nannette Palacio MD   Chemotherapy fosaprepitant (Emend) 150 mg in sodium chloride 0.9% 250 mL IV, 150 mg, intravenous, Once, 0 of 6 cycles    pegfilgrastim (Neulasta Onpro) injection 6 mg, 6 mg, subcutaneous, Once, 0 of 6 cycles    CARBOplatin (Paraplatin) in sodium chloride 0.9% 100 mL IV, 430 mg, intravenous, Once, 0 of 6 cycles    PACLitaxeL (Taxol) 216 mg in dextrose 5% 286 mL IV, 135 mg/m2 = 216 mg (77.1 % of original dose 175 mg/m2), intravenous, Once, 0 of 6 cycles  Dose modification: 135 mg/m2 (original dose 175 mg/m2, Cycle 1, Reason: Other (See Comments))    methylPREDNISolone sod succinate (SOLU-Medrol) 40 mg/mL injection 40 mg, 40 mg, intravenous, As needed, 0 of 20 cycles    palonosetron (Aloxi) injection 0.25 mg, 0.25 mg, intravenous, Once, 0 of 6 cycles    pembrolizumab (Keytruda) 200 mg in sodium chloride 0.9% 108 mL IV, 200 mg, intravenous, Once, 0 of 20 cycles          Objective  There were no vitals taken for this visit.  Lab Results   Component Value Date    WBC 3.3 (L) 03/17/2025    HGB 13.9 03/17/2025    HCT 42.5 03/17/2025    MCV 90 03/17/2025     03/17/2025      Lab Results   Component Value Date    GLUCOSE 81 03/17/2025    CALCIUM 9.4 03/17/2025     03/17/2025    K 4.0 03/17/2025    CO2 28 03/17/2025     03/17/2025    BUN 17 03/17/2025    CREATININE 0.60 03/17/2025     Lab Results   Component Value Date    ALT 10 03/17/2025    AST 12 03/17/2025    ALKPHOS 68 03/17/2025    BILITOT 0.6 03/17/2025       Allergies and Medications   No Known Allergies    Current Outpatient  Medications:     escitalopram (Lexapro) 10 mg tablet, Take 1 tablet (10 mg) by mouth once daily., Disp: 30 tablet, Rfl: 5    ondansetron ODT (Zofran-ODT) 4 mg disintegrating tablet, Dissolve 1 tablet (4 mg) in the mouth every 8 hours if needed for nausea or vomiting for up to 20 days., Disp: 20 tablet, Rfl: 0    pseudoephedrine HCl (SUDAFED 12 HOUR ORAL), Take by mouth., Disp: , Rfl:     Assessment and Plan  Mica Vanessa is a 69 y.o. female with endometrial cancer, to be treated with carboplatin/paclitaxel/pembrolizumab.    Chemotherapy  Education: Reviewed drug, dose, frequency, administration, treatment cycle, duration of therapy, and missed doses. Counseled on potential side effects including but not limited to chemotherapy side effects: neutropenia, infection risk, anemia, fatigue, weakness, low energy, thrombocytopenia, bleeding/bruising, n/v, diarrhea, constipation, electrolyte changes, hair loss, headache, muscle or joint pain, mucositis, skin rash, infusion reactions, and neuropathy. Other chemotherapy toxicities include taste changes, signs of organ dysfunction, and nail discoloration. Reviewed immunotherapy toxicities: dermatitis, nephritis, hepatitis, colitis, endocrinopathies, pulmonary toxicities, cardiotoxicity, neurotoxicity, and blood sugar changes. Discussed techniques to mitigate severity of side effects such as blood count checks, temperature checks, electrolyte monitoring, antiemetic use, loperamide use with max dose of 8 tabs per 24 hours, staying hydrated if having diarrhea, and monitoring for leg edema, SOB, trouble breathing. Unable to provide handouts to due to virtual nature of encounter. Patient had questions about toxicities. All questions answered and contact information was given to patient.   Drug-drug interactions: no major interactions  Time spent on patient care: 30 minutes             Norm Joseph, AnamD

## 2025-03-25 ENCOUNTER — TELEPHONE (OUTPATIENT)
Dept: GYNECOLOGIC ONCOLOGY | Facility: HOSPITAL | Age: 70
End: 2025-03-25
Payer: MEDICARE

## 2025-03-25 DIAGNOSIS — C80.1 ANXIETY ASSOCIATED WITH CANCER DIAGNOSIS: Primary | ICD-10-CM

## 2025-03-25 DIAGNOSIS — F41.1 ANXIETY ASSOCIATED WITH CANCER DIAGNOSIS: Primary | ICD-10-CM

## 2025-03-25 RX ORDER — LORAZEPAM 0.5 MG/1
0.5 TABLET ORAL EVERY 8 HOURS PRN
Qty: 14 TABLET | Refills: 0 | Status: SHIPPED | OUTPATIENT
Start: 2025-03-25 | End: 2025-04-04

## 2025-03-25 NOTE — TELEPHONE ENCOUNTER
Patient sent a message via her [employer] asking to speak to Dr. Palacio about her anxiety. Call to patient and LM to call the office so we can help her.

## 2025-03-25 NOTE — TELEPHONE ENCOUNTER
Patient returned call. She reports that she is having so much anxiety she can't sleep and she doesn't know what to do. Review of her medications: She has an Rx for Lexapro. She reports that she has not picked up the Rx and did not want to take it because she does not want to wait 8 weeks for something to work. She needs something now to help her. She is asking for Ativan. Her daughter had cancer treatment and was given Ativan that helped her. She stated that she is not depressed and did not need Lexapro. Discussed that Lexapro is used for anxiety and can help for long term treatment to prevent panic attacks. Ativan is a short term solution to anxiety and can be addictive if used chronically. Discussed that she should give Lexapro a try for at least 2 weeks and to start it at night on the weekend to see how she tolerates it. Will discuss with Dr. Palacio and our pharmacist Norm KEN to request an Rx for Ativan to treat short term anxiety.

## 2025-03-28 ENCOUNTER — LAB (OUTPATIENT)
Dept: LAB | Facility: CLINIC | Age: 70
End: 2025-03-28
Payer: MEDICARE

## 2025-03-28 ENCOUNTER — TELEPHONE (OUTPATIENT)
Dept: GYNECOLOGIC ONCOLOGY | Facility: HOSPITAL | Age: 70
End: 2025-03-28
Payer: MEDICARE

## 2025-03-28 DIAGNOSIS — C54.1 MALIGNANT NEOPLASM OF ENDOMETRIUM (MULTI): ICD-10-CM

## 2025-03-28 LAB
ALBUMIN SERPL BCP-MCNC: 4.7 G/DL (ref 3.4–5)
ALP SERPL-CCNC: 76 U/L (ref 33–136)
ALT SERPL W P-5'-P-CCNC: 10 U/L (ref 7–45)
ANION GAP SERPL CALC-SCNC: 12 MMOL/L (ref 10–20)
AST SERPL W P-5'-P-CCNC: 14 U/L (ref 9–39)
BASOPHILS # BLD AUTO: 0.02 X10*3/UL (ref 0–0.1)
BASOPHILS NFR BLD AUTO: 0.4 %
BILIRUB SERPL-MCNC: 0.4 MG/DL (ref 0–1.2)
BUN SERPL-MCNC: 16 MG/DL (ref 6–23)
CALCIUM SERPL-MCNC: 9.8 MG/DL (ref 8.6–10.3)
CHLORIDE SERPL-SCNC: 100 MMOL/L (ref 98–107)
CO2 SERPL-SCNC: 29 MMOL/L (ref 21–32)
CREAT SERPL-MCNC: 0.61 MG/DL (ref 0.5–1.05)
EGFRCR SERPLBLD CKD-EPI 2021: >90 ML/MIN/1.73M*2
EOSINOPHIL # BLD AUTO: 0.1 X10*3/UL (ref 0–0.7)
EOSINOPHIL NFR BLD AUTO: 2 %
ERYTHROCYTE [DISTWIDTH] IN BLOOD BY AUTOMATED COUNT: 12.5 % (ref 11.5–14.5)
GLUCOSE SERPL-MCNC: 94 MG/DL (ref 74–99)
HCT VFR BLD AUTO: 42.4 % (ref 36–46)
HGB BLD-MCNC: 14 G/DL (ref 12–16)
IMM GRANULOCYTES # BLD AUTO: 0 X10*3/UL (ref 0–0.7)
IMM GRANULOCYTES NFR BLD AUTO: 0 % (ref 0–0.9)
LYMPHOCYTES # BLD AUTO: 1.16 X10*3/UL (ref 1.2–4.8)
LYMPHOCYTES NFR BLD AUTO: 23.2 %
MAGNESIUM SERPL-MCNC: 2 MG/DL (ref 1.6–2.4)
MCH RBC QN AUTO: 29.7 PG (ref 26–34)
MCHC RBC AUTO-ENTMCNC: 33 G/DL (ref 32–36)
MCV RBC AUTO: 90 FL (ref 80–100)
MONOCYTES # BLD AUTO: 0.4 X10*3/UL (ref 0.1–1)
MONOCYTES NFR BLD AUTO: 8 %
NEUTROPHILS # BLD AUTO: 3.32 X10*3/UL (ref 1.2–7.7)
NEUTROPHILS NFR BLD AUTO: 66.4 %
PLATELET # BLD AUTO: 286 X10*3/UL (ref 150–450)
POTASSIUM SERPL-SCNC: 3.8 MMOL/L (ref 3.5–5.3)
PROT SERPL-MCNC: 7 G/DL (ref 6.4–8.2)
RBC # BLD AUTO: 4.71 X10*6/UL (ref 4–5.2)
SODIUM SERPL-SCNC: 137 MMOL/L (ref 136–145)
TSH SERPL-ACNC: 1.27 MIU/L (ref 0.44–3.98)
WBC # BLD AUTO: 5 X10*3/UL (ref 4.4–11.3)

## 2025-03-28 PROCEDURE — 86304 IMMUNOASSAY TUMOR CA 125: CPT

## 2025-03-28 PROCEDURE — 86704 HEP B CORE ANTIBODY TOTAL: CPT

## 2025-03-28 PROCEDURE — 84443 ASSAY THYROID STIM HORMONE: CPT

## 2025-03-28 PROCEDURE — 85025 COMPLETE CBC W/AUTO DIFF WBC: CPT

## 2025-03-28 PROCEDURE — 87340 HEPATITIS B SURFACE AG IA: CPT

## 2025-03-28 PROCEDURE — 82947 ASSAY GLUCOSE BLOOD QUANT: CPT

## 2025-03-28 PROCEDURE — 86706 HEP B SURFACE ANTIBODY: CPT

## 2025-03-28 PROCEDURE — 82533 TOTAL CORTISOL: CPT

## 2025-03-28 PROCEDURE — 83735 ASSAY OF MAGNESIUM: CPT

## 2025-03-28 PROCEDURE — 36415 COLL VENOUS BLD VENIPUNCTURE: CPT

## 2025-03-28 PROCEDURE — 82024 ASSAY OF ACTH: CPT

## 2025-03-28 NOTE — TELEPHONE ENCOUNTER
Call to patient and requested that she have pretreatment labs. Also reminded her to take 5 tablets of Decadron on Sunday night with food. Also instructed her to take all of her usual medications and eat a usual breakfast on the morning of chemo. She will see Dr. Palacio for consent and treatment to follow. She will have her labs at [facility].

## 2025-03-29 LAB
CANCER AG125 SERPL-ACNC: 15.2 U/ML (ref 0–30.2)
CORTIS AM PEAK SERPL-MSCNC: 13.3 UG/DL (ref 5–20)
HBV CORE AB SER QL: NONREACTIVE
HBV SURFACE AB SER-ACNC: <3.1 MIU/ML
HBV SURFACE AG SERPL QL IA: NONREACTIVE

## 2025-03-30 LAB — ACTH PLAS-MCNC: 10.8 PG/ML (ref 7.2–63.3)

## 2025-03-31 ENCOUNTER — OFFICE VISIT (OUTPATIENT)
Dept: GYNECOLOGIC ONCOLOGY | Facility: CLINIC | Age: 70
End: 2025-03-31
Payer: MEDICARE

## 2025-03-31 ENCOUNTER — INFUSION (OUTPATIENT)
Dept: HEMATOLOGY/ONCOLOGY | Facility: CLINIC | Age: 70
End: 2025-03-31
Payer: MEDICARE

## 2025-03-31 VITALS
HEART RATE: 87 BPM | BODY MASS INDEX: 25.58 KG/M2 | DIASTOLIC BLOOD PRESSURE: 86 MMHG | SYSTOLIC BLOOD PRESSURE: 128 MMHG | OXYGEN SATURATION: 96 % | TEMPERATURE: 98.2 F | WEIGHT: 130.95 LBS | RESPIRATION RATE: 16 BRPM

## 2025-03-31 VITALS — BODY MASS INDEX: 24.85 KG/M2 | HEIGHT: 61 IN

## 2025-03-31 DIAGNOSIS — Z51.11 CHEMOTHERAPY MANAGEMENT, ENCOUNTER FOR: ICD-10-CM

## 2025-03-31 DIAGNOSIS — C54.1 MALIGNANT NEOPLASM OF ENDOMETRIUM (MULTI): ICD-10-CM

## 2025-03-31 DIAGNOSIS — C54.1 MALIGNANT NEOPLASM OF ENDOMETRIUM (MULTI): Primary | ICD-10-CM

## 2025-03-31 PROCEDURE — 99215 OFFICE O/P EST HI 40 MIN: CPT | Mod: 25 | Performed by: OBSTETRICS & GYNECOLOGY

## 2025-03-31 PROCEDURE — 3074F SYST BP LT 130 MM HG: CPT | Performed by: OBSTETRICS & GYNECOLOGY

## 2025-03-31 PROCEDURE — 2500000001 HC RX 250 WO HCPCS SELF ADMINISTERED DRUGS (ALT 637 FOR MEDICARE OP): Performed by: OBSTETRICS & GYNECOLOGY

## 2025-03-31 PROCEDURE — 2500000004 HC RX 250 GENERAL PHARMACY W/ HCPCS (ALT 636 FOR OP/ED): Performed by: OBSTETRICS & GYNECOLOGY

## 2025-03-31 PROCEDURE — 96413 CHEMO IV INFUSION 1 HR: CPT

## 2025-03-31 PROCEDURE — 96367 TX/PROPH/DG ADDL SEQ IV INF: CPT

## 2025-03-31 PROCEDURE — 99215 OFFICE O/P EST HI 40 MIN: CPT | Performed by: OBSTETRICS & GYNECOLOGY

## 2025-03-31 PROCEDURE — 96375 TX/PRO/DX INJ NEW DRUG ADDON: CPT | Mod: INF

## 2025-03-31 PROCEDURE — 96417 CHEMO IV INFUS EACH ADDL SEQ: CPT

## 2025-03-31 PROCEDURE — 1036F TOBACCO NON-USER: CPT | Performed by: OBSTETRICS & GYNECOLOGY

## 2025-03-31 PROCEDURE — 96415 CHEMO IV INFUSION ADDL HR: CPT

## 2025-03-31 PROCEDURE — G2211 COMPLEX E/M VISIT ADD ON: HCPCS | Performed by: OBSTETRICS & GYNECOLOGY

## 2025-03-31 PROCEDURE — 1126F AMNT PAIN NOTED NONE PRSNT: CPT | Performed by: OBSTETRICS & GYNECOLOGY

## 2025-03-31 PROCEDURE — 1159F MED LIST DOCD IN RCRD: CPT | Performed by: OBSTETRICS & GYNECOLOGY

## 2025-03-31 PROCEDURE — 96377 APPLICATON ON-BODY INJECTOR: CPT | Mod: 59

## 2025-03-31 PROCEDURE — 1160F RVW MEDS BY RX/DR IN RCRD: CPT | Performed by: OBSTETRICS & GYNECOLOGY

## 2025-03-31 PROCEDURE — 3079F DIAST BP 80-89 MM HG: CPT | Performed by: OBSTETRICS & GYNECOLOGY

## 2025-03-31 RX ORDER — DIPHENHYDRAMINE HYDROCHLORIDE 50 MG/ML
50 INJECTION, SOLUTION INTRAMUSCULAR; INTRAVENOUS AS NEEDED
Status: DISCONTINUED | OUTPATIENT
Start: 2025-03-31 | End: 2025-03-31 | Stop reason: HOSPADM

## 2025-03-31 RX ORDER — FAMOTIDINE 10 MG/ML
20 INJECTION, SOLUTION INTRAVENOUS ONCE AS NEEDED
Status: DISCONTINUED | OUTPATIENT
Start: 2025-03-31 | End: 2025-03-31 | Stop reason: HOSPADM

## 2025-03-31 RX ORDER — HEPARIN SODIUM,PORCINE/PF 10 UNIT/ML
50 SYRINGE (ML) INTRAVENOUS AS NEEDED
Status: CANCELLED | OUTPATIENT
Start: 2025-03-31

## 2025-03-31 RX ORDER — PROCHLORPERAZINE MALEATE 10 MG
10 TABLET ORAL EVERY 6 HOURS PRN
Status: CANCELLED | OUTPATIENT
Start: 2025-03-31

## 2025-03-31 RX ORDER — DIPHENHYDRAMINE HYDROCHLORIDE 50 MG/ML
50 INJECTION, SOLUTION INTRAMUSCULAR; INTRAVENOUS AS NEEDED
Status: CANCELLED | OUTPATIENT
Start: 2025-03-31

## 2025-03-31 RX ORDER — FAMOTIDINE 10 MG/ML
20 INJECTION, SOLUTION INTRAVENOUS ONCE
Status: CANCELLED | OUTPATIENT
Start: 2025-03-31

## 2025-03-31 RX ORDER — DIPHENHYDRAMINE HCL 25 MG
50 CAPSULE ORAL ONCE
Status: CANCELLED | OUTPATIENT
Start: 2025-03-31

## 2025-03-31 RX ORDER — ALBUTEROL SULFATE 0.83 MG/ML
3 SOLUTION RESPIRATORY (INHALATION) AS NEEDED
Status: CANCELLED | OUTPATIENT
Start: 2025-03-31

## 2025-03-31 RX ORDER — PALONOSETRON 0.05 MG/ML
0.25 INJECTION, SOLUTION INTRAVENOUS ONCE
Status: COMPLETED | OUTPATIENT
Start: 2025-03-31 | End: 2025-03-31

## 2025-03-31 RX ORDER — EPINEPHRINE 0.3 MG/.3ML
0.3 INJECTION SUBCUTANEOUS EVERY 5 MIN PRN
Status: CANCELLED | OUTPATIENT
Start: 2025-03-31

## 2025-03-31 RX ORDER — FAMOTIDINE 10 MG/ML
20 INJECTION, SOLUTION INTRAVENOUS ONCE AS NEEDED
Status: CANCELLED | OUTPATIENT
Start: 2025-03-31

## 2025-03-31 RX ORDER — HEPARIN 100 UNIT/ML
500 SYRINGE INTRAVENOUS AS NEEDED
Status: CANCELLED | OUTPATIENT
Start: 2025-03-31

## 2025-03-31 RX ORDER — DIPHENHYDRAMINE HCL 25 MG
50 CAPSULE ORAL ONCE
Status: COMPLETED | OUTPATIENT
Start: 2025-03-31 | End: 2025-03-31

## 2025-03-31 RX ORDER — EPINEPHRINE 0.3 MG/.3ML
0.3 INJECTION SUBCUTANEOUS EVERY 5 MIN PRN
Status: DISCONTINUED | OUTPATIENT
Start: 2025-03-31 | End: 2025-03-31 | Stop reason: HOSPADM

## 2025-03-31 RX ORDER — FAMOTIDINE 10 MG/ML
20 INJECTION, SOLUTION INTRAVENOUS ONCE
Status: COMPLETED | OUTPATIENT
Start: 2025-03-31 | End: 2025-03-31

## 2025-03-31 RX ORDER — PALONOSETRON 0.05 MG/ML
0.25 INJECTION, SOLUTION INTRAVENOUS ONCE
Status: CANCELLED | OUTPATIENT
Start: 2025-03-31

## 2025-03-31 RX ORDER — ALBUTEROL SULFATE 0.83 MG/ML
3 SOLUTION RESPIRATORY (INHALATION) AS NEEDED
Status: DISCONTINUED | OUTPATIENT
Start: 2025-03-31 | End: 2025-03-31 | Stop reason: HOSPADM

## 2025-03-31 RX ORDER — HEPARIN 100 UNIT/ML
500 SYRINGE INTRAVENOUS AS NEEDED
OUTPATIENT
Start: 2025-03-31

## 2025-03-31 RX ORDER — PROCHLORPERAZINE EDISYLATE 5 MG/ML
10 INJECTION INTRAMUSCULAR; INTRAVENOUS EVERY 6 HOURS PRN
Status: CANCELLED | OUTPATIENT
Start: 2025-03-31

## 2025-03-31 RX ORDER — HEPARIN SODIUM,PORCINE/PF 10 UNIT/ML
50 SYRINGE (ML) INTRAVENOUS AS NEEDED
OUTPATIENT
Start: 2025-03-31

## 2025-03-31 RX ADMIN — FAMOTIDINE 20 MG: 10 INJECTION INTRAVENOUS at 09:28

## 2025-03-31 RX ADMIN — FOSAPREPITANT 150 MG: 150 INJECTION, POWDER, LYOPHILIZED, FOR SOLUTION INTRAVENOUS at 10:01

## 2025-03-31 RX ADMIN — CARBOPLATIN 430 MG: 10 INJECTION INTRAVENOUS at 14:38

## 2025-03-31 RX ADMIN — DEXAMETHASONE SODIUM PHOSPHATE 12 MG: 10 INJECTION, SOLUTION INTRAMUSCULAR; INTRAVENOUS at 09:41

## 2025-03-31 RX ADMIN — PALONOSETRON 0.25 MG: 0.05 INJECTION, SOLUTION INTRAVENOUS at 09:31

## 2025-03-31 RX ADMIN — PACLITAXEL 216 MG: 6 INJECTION INTRAVENOUS at 11:27

## 2025-03-31 RX ADMIN — DIPHENHYDRAMINE HYDROCHLORIDE 50 MG: 25 CAPSULE ORAL at 09:15

## 2025-03-31 RX ADMIN — SODIUM CHLORIDE 200 MG: 9 INJECTION, SOLUTION INTRAVENOUS at 10:44

## 2025-03-31 RX ADMIN — PEGFILGRASTIM 6 MG: KIT SUBCUTANEOUS at 14:45

## 2025-03-31 ASSESSMENT — PAIN SCALES - GENERAL: PAINLEVEL_OUTOF10: 0-NO PAIN

## 2025-03-31 NOTE — PATIENT INSTRUCTIONS
During today's visit you received an antinausea medication called Aloxi (palonosetron). This medication will stay in your body system over the next 2 days (48hrs). During this time frame please do NOT take Zofran (ondansetron) for your nausea because it works along the same pathway as Aloxi and it will not help your symptoms. IF you are experiencing nausea and would like to take something, please feel free to take Compazine (prochlorperazine) as prescribed.     Once the 2 days (48 hrs) have elapsed you can take either Zofran or Compazine to help with any symptoms of nausea.     Also during today's visit you were given the Neulasta OnPro device - a medication that will stimulate white blood cell production. This medication is meant to boost your white blood cells to help your body have enough fighters to fight an infection if you come into contact with something trying to affect your immune system. One of the major side effects to this is joint pain. Please feel free to take Claritin (loratadine) today and daily for the next 3-5 days to help with joint pain.

## 2025-03-31 NOTE — SIGNIFICANT EVENT
03/31/25 0822   Prechemo Checklist   Has the patient been in the hospital, ED, or urgent care since last date of service No   Chemo/Immuno Consent Completed and Signed Yes  (3/31/25)   Protocol/Indications Verified Yes   Confirmed to previous date/time of medication N/A  (C1D1 today)   Compared to previous dose N/A  (C1D1 today)   All medications are dated accurately Yes   Pregnancy Test Negative Not applicable   Parameters Met Yes  (labs done: 3/28 - ANC: 3.32, PLTs: 286, AST: 14, ALT: 10, bili: 0.4, SCr: 0.61)   BSA/Weight-Height Verified Yes   Dose Calculations Verified (current, total, cumulative) Yes

## 2025-03-31 NOTE — PROGRESS NOTES
Patient ID: Mica Vanessa is a 69 y.o. female.  Referring Physician: No referring provider defined for this encounter.  Primary Care Provider: Joi Valle DO    Subjective    For chemo consent.  Pretreatment labs reviewed and appropriate for tx    No new symptoms.  Will have CT abd/pelvis this week for baseline.          Objective    BSA: 1.59 meters squared  /86 (BP Location: Right arm, Patient Position: Sitting, BP Cuff Size: Adult)   Pulse 87   Temp 36.8 °C (98.2 °F) (Temporal)   Resp 16   Wt 59.4 kg (130 lb 15.3 oz)   SpO2 96%   BMI 25.58 kg/m²      Physical Exam  Vitals and nursing note reviewed.   Constitutional:       Appearance: Normal appearance.   HENT:      Head: Normocephalic.   Eyes:      Pupils: Pupils are equal, round, and reactive to light.   Cardiovascular:      Rate and Rhythm: Normal rate and regular rhythm.   Pulmonary:      Effort: Pulmonary effort is normal.      Breath sounds: Normal breath sounds.   Abdominal:      General: Abdomen is flat.      Palpations: Abdomen is soft.      Tenderness: There is no abdominal tenderness.   Musculoskeletal:         General: Normal range of motion.      Cervical back: Normal range of motion and neck supple.   Skin:     General: Skin is warm and dry.   Neurological:      Mental Status: She is alert and oriented to person, place, and time.   Psychiatric:         Mood and Affect: Mood normal.         Behavior: Behavior normal.         Performance Status:  Symptomatic; fully ambulatory    Assessment/Plan     Oncology History Overview Note   Cancer history:  10/6/23 EMB w/ uterine cancer, mesonephric type  10/21/23 Total laparoscopic hysterectomy bilateral salpingo oophorectomy, Bilateral sentinel lymph node injection and mapping, Left pelvic sentinel lymph node dissection and right complete pelvic lymphadenectomy, Cystourethroscopy.  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 MMRp, P53 alison type. TB  recommendations for radiation oncology consult, genetics referral, consider systemic therapy  Adjuvant pelvic RT  7/2024 - new pulmonary nodules, all less than 1cm  10/2024 CT chest stable  3/2025 biopsy of lung nodule - c/w metastatic uterine cancer     Malignant neoplasm of endometrium (Multi)   11/10/2023 Initial Diagnosis    Malignant neoplasm of endometrium (Multi)     3/31/2025 -  Chemotherapy    Pembrolizumab + PACLitaxel / CARBOplatin, 21 Day Cycles followed by Pembrolizumab, 42 Day Cycles - Gyn           Problem List Items Addressed This Visit             ICD-10-CM    Malignant neoplasm of endometrium (Multi) - Primary C54.1    Relevant Orders    Clinic Appointment Request    Chemotherapy management, encounter for Z51.11       Treatment Plans       Name Type Plan Dates Plan Provider         Active    Pembrolizumab + PACLitaxel / CARBOplatin, 21 Day Cycles followed by Pembrolizumab, 42 Day Cycles - Gyn  Oncology Treatment 3/28/2025 - Present Nannette Palacio MD                    Feeling well.  Consent reviewed and toxicities discussed.  For cycle #1 today.  Baseline CT abd/pelvis this week.

## 2025-04-02 ENCOUNTER — HOSPITAL ENCOUNTER (OUTPATIENT)
Dept: RADIOLOGY | Facility: HOSPITAL | Age: 70
Discharge: HOME | End: 2025-04-02
Payer: MEDICARE

## 2025-04-02 DIAGNOSIS — C54.1 MALIGNANT NEOPLASM OF ENDOMETRIUM (MULTI): ICD-10-CM

## 2025-04-02 PROCEDURE — 2550000001 HC RX 255 CONTRASTS: Performed by: OBSTETRICS & GYNECOLOGY

## 2025-04-02 PROCEDURE — 74177 CT ABD & PELVIS W/CONTRAST: CPT

## 2025-04-02 RX ADMIN — IOHEXOL 75 ML: 350 INJECTION, SOLUTION INTRAVENOUS at 14:07

## 2025-04-07 ENCOUNTER — TELEPHONE (OUTPATIENT)
Dept: GYNECOLOGIC ONCOLOGY | Facility: HOSPITAL | Age: 70
End: 2025-04-07
Payer: MEDICARE

## 2025-04-07 NOTE — TELEPHONE ENCOUNTER
Patient calling with side effects from chemotherapy- joint pain, constipation, oral sensitivity. She would like to know what she can take for these. She has been afebrile. She would like to know if ibuprofen is OK. We discussed that as long as it is not contraindicated by the provider who manages her Khan's esophagus, she may take this, along with rotating with Tylenol Q 3-4 hours if needed. We discussed taking 4500 mg L-Lysine daily for oral sensitivity/pain. Also discussed daily Miralax for constipation. Patient verbalized understanding.

## 2025-04-11 ENCOUNTER — TELEPHONE (OUTPATIENT)
Dept: HEMATOLOGY/ONCOLOGY | Facility: CLINIC | Age: 70
End: 2025-04-11
Payer: MEDICARE

## 2025-04-11 NOTE — TELEPHONE ENCOUNTER
Spoke with Mica-She does not need to follow up at this time with Dr. Plata. She will be following up with Gyn-Onc Dr. Palacio for her recurrent cancer. She will call and follow up with Dr. Plata if needed after chemo.

## 2025-04-14 ENCOUNTER — APPOINTMENT (OUTPATIENT)
Dept: HEMATOLOGY/ONCOLOGY | Facility: CLINIC | Age: 70
End: 2025-04-14
Payer: MEDICARE

## 2025-04-18 ENCOUNTER — LAB (OUTPATIENT)
Dept: LAB | Facility: CLINIC | Age: 70
End: 2025-04-18
Payer: MEDICARE

## 2025-04-18 DIAGNOSIS — C54.1 MALIGNANT NEOPLASM OF ENDOMETRIUM (MULTI): ICD-10-CM

## 2025-04-18 DIAGNOSIS — C54.1 MALIGNANT NEOPLASM OF ENDOMETRIUM (MULTI): Primary | ICD-10-CM

## 2025-04-18 LAB
ALBUMIN SERPL BCP-MCNC: 4.6 G/DL (ref 3.4–5)
ALP SERPL-CCNC: 89 U/L (ref 33–136)
ALT SERPL W P-5'-P-CCNC: 17 U/L (ref 7–45)
ANION GAP SERPL CALC-SCNC: 15 MMOL/L (ref 10–20)
AST SERPL W P-5'-P-CCNC: 15 U/L (ref 9–39)
BASOPHILS # BLD AUTO: 0.04 X10*3/UL (ref 0–0.1)
BASOPHILS NFR BLD AUTO: 0.9 %
BILIRUB SERPL-MCNC: 0.5 MG/DL (ref 0–1.2)
BUN SERPL-MCNC: 19 MG/DL (ref 6–23)
CALCIUM SERPL-MCNC: 9.4 MG/DL (ref 8.6–10.3)
CHLORIDE SERPL-SCNC: 98 MMOL/L (ref 98–107)
CO2 SERPL-SCNC: 26 MMOL/L (ref 21–32)
CREAT SERPL-MCNC: 0.62 MG/DL (ref 0.5–1.05)
EGFRCR SERPLBLD CKD-EPI 2021: >90 ML/MIN/1.73M*2
EOSINOPHIL # BLD AUTO: 0.04 X10*3/UL (ref 0–0.7)
EOSINOPHIL NFR BLD AUTO: 0.9 %
ERYTHROCYTE [DISTWIDTH] IN BLOOD BY AUTOMATED COUNT: 13.5 % (ref 11.5–14.5)
GLUCOSE SERPL-MCNC: 90 MG/DL (ref 74–99)
HCT VFR BLD AUTO: 40.5 % (ref 36–46)
HGB BLD-MCNC: 13.2 G/DL (ref 12–16)
IMM GRANULOCYTES # BLD AUTO: 0.02 X10*3/UL (ref 0–0.7)
IMM GRANULOCYTES NFR BLD AUTO: 0.4 % (ref 0–0.9)
LYMPHOCYTES # BLD AUTO: 0.67 X10*3/UL (ref 1.2–4.8)
LYMPHOCYTES NFR BLD AUTO: 14.9 %
MAGNESIUM SERPL-MCNC: 1.9 MG/DL (ref 1.6–2.4)
MCH RBC QN AUTO: 30.3 PG (ref 26–34)
MCHC RBC AUTO-ENTMCNC: 32.6 G/DL (ref 32–36)
MCV RBC AUTO: 93 FL (ref 80–100)
MONOCYTES # BLD AUTO: 0.41 X10*3/UL (ref 0.1–1)
MONOCYTES NFR BLD AUTO: 9.1 %
NEUTROPHILS # BLD AUTO: 3.31 X10*3/UL (ref 1.2–7.7)
NEUTROPHILS NFR BLD AUTO: 73.8 %
PLATELET # BLD AUTO: 368 X10*3/UL (ref 150–450)
POTASSIUM SERPL-SCNC: 4.1 MMOL/L (ref 3.5–5.3)
PROT SERPL-MCNC: 6.8 G/DL (ref 6.4–8.2)
RBC # BLD AUTO: 4.36 X10*6/UL (ref 4–5.2)
SODIUM SERPL-SCNC: 135 MMOL/L (ref 136–145)
WBC # BLD AUTO: 4.5 X10*3/UL (ref 4.4–11.3)

## 2025-04-18 PROCEDURE — 85025 COMPLETE CBC W/AUTO DIFF WBC: CPT

## 2025-04-18 PROCEDURE — 84075 ASSAY ALKALINE PHOSPHATASE: CPT

## 2025-04-18 PROCEDURE — 86304 IMMUNOASSAY TUMOR CA 125: CPT

## 2025-04-18 PROCEDURE — 83735 ASSAY OF MAGNESIUM: CPT

## 2025-04-18 PROCEDURE — 36415 COLL VENOUS BLD VENIPUNCTURE: CPT

## 2025-04-18 RX ORDER — PROCHLORPERAZINE EDISYLATE 5 MG/ML
10 INJECTION INTRAMUSCULAR; INTRAVENOUS EVERY 6 HOURS PRN
OUTPATIENT
Start: 2025-04-21

## 2025-04-18 RX ORDER — FAMOTIDINE 10 MG/ML
20 INJECTION, SOLUTION INTRAVENOUS ONCE
OUTPATIENT
Start: 2025-04-21

## 2025-04-18 RX ORDER — FAMOTIDINE 10 MG/ML
20 INJECTION, SOLUTION INTRAVENOUS ONCE AS NEEDED
OUTPATIENT
Start: 2025-04-21

## 2025-04-18 RX ORDER — PALONOSETRON 0.05 MG/ML
0.25 INJECTION, SOLUTION INTRAVENOUS ONCE
OUTPATIENT
Start: 2025-04-21

## 2025-04-18 RX ORDER — DIPHENHYDRAMINE HYDROCHLORIDE 50 MG/ML
50 INJECTION, SOLUTION INTRAMUSCULAR; INTRAVENOUS AS NEEDED
OUTPATIENT
Start: 2025-04-21

## 2025-04-18 RX ORDER — DIPHENHYDRAMINE HCL 25 MG
50 CAPSULE ORAL ONCE
OUTPATIENT
Start: 2025-04-21

## 2025-04-18 RX ORDER — EPINEPHRINE 0.3 MG/.3ML
0.3 INJECTION SUBCUTANEOUS EVERY 5 MIN PRN
OUTPATIENT
Start: 2025-04-21

## 2025-04-18 RX ORDER — PROCHLORPERAZINE MALEATE 10 MG
10 TABLET ORAL EVERY 6 HOURS PRN
OUTPATIENT
Start: 2025-04-21

## 2025-04-18 RX ORDER — ALBUTEROL SULFATE 0.83 MG/ML
3 SOLUTION RESPIRATORY (INHALATION) AS NEEDED
OUTPATIENT
Start: 2025-04-21

## 2025-04-18 NOTE — PROGRESS NOTES
Patient ID: Mica Vanessa is a 69 y.o. female.  Referring Physician: No referring provider defined for this encounter.  Primary Care Provider: Joi Valle,     Subjective    Pretreatment labs reviewed and appropriate for tx    Tolerated first cycle well. Had some constipation, improved with Senna and would like a refill. Reports LLQ that she has been having for some time resolved with the dexamethasone.    Mild mouth soreness, improved with Lysine and mouth rinses. Denies any open sores.          Objective    BSA: There is no height or weight on file to calculate BSA.  There were no vitals taken for this visit.     Physical Exam  Vitals and nursing note reviewed.   Constitutional:       Appearance: Normal appearance.   HENT:      Head: Normocephalic.   Eyes:      Pupils: Pupils are equal, round, and reactive to light.   Cardiovascular:      Rate and Rhythm: Normal rate and regular rhythm.   Pulmonary:      Effort: Pulmonary effort is normal.      Breath sounds: Normal breath sounds.   Abdominal:      General: Abdomen is flat.      Palpations: Abdomen is soft.      Tenderness: There is no abdominal tenderness.   Musculoskeletal:         General: Normal range of motion.      Cervical back: Normal range of motion and neck supple.   Skin:     General: Skin is warm and dry.   Neurological:      Mental Status: She is alert and oriented to person, place, and time.   Psychiatric:         Mood and Affect: Mood normal.         Behavior: Behavior normal.         Performance Status:  Symptomatic; fully ambulatory    Assessment/Plan     Oncology History Overview Note   Cancer history:  10/6/23 EMB w/ uterine cancer, mesonephric type  10/21/23 Total laparoscopic hysterectomy bilateral salpingo oophorectomy, Bilateral sentinel lymph node injection and mapping, Left pelvic sentinel lymph node dissection and right complete pelvic lymphadenectomy, Cystourethroscopy.  Final path Stage IB mesonephric like endometrial cancer,  negative lymph nodes, +LVSI. MMRp, P53 alison type. TB recommendations for radiation oncology consult, genetics referral, consider systemic therapy  Adjuvant pelvic RT  7/2024 - new pulmonary nodules, all less than 1cm  10/2024 CT chest stable  3/2025 biopsy of lung nodule - c/w metastatic uterine cancer     Malignant neoplasm of endometrium (Multi)   11/10/2023 Initial Diagnosis    Malignant neoplasm of endometrium (Multi)     3/31/2025 -  Chemotherapy    Pembrolizumab + PACLitaxel / CARBOplatin, 21 Day Cycles followed by Pembrolizumab, 42 Day Cycles - Gyn           Problem List Items Addressed This Visit    None  Visit Diagnoses         Codes      Constipation, unspecified constipation type    -  Primary K59.00    Relevant Medications    sennosides (senna) 8.6 mg capsule            Treatment Plans       Name Type Plan Dates Plan Provider         Active    Pembrolizumab + PACLitaxel / CARBOplatin, 21 Day Cycles followed by Pembrolizumab, 42 Day Cycles - Gyn  Oncology Treatment 3/28/2025 - Present Nannette Palacio MD                    Feeling well.      Proceed with cycle #2 today    Rx Senna as needed for constipation    CT after 3

## 2025-04-19 LAB — CANCER AG125 SERPL-ACNC: 14.5 U/ML (ref 0–30.2)

## 2025-04-21 ENCOUNTER — OFFICE VISIT (OUTPATIENT)
Dept: GYNECOLOGIC ONCOLOGY | Facility: CLINIC | Age: 70
End: 2025-04-21
Payer: MEDICARE

## 2025-04-21 ENCOUNTER — APPOINTMENT (OUTPATIENT)
Dept: GYNECOLOGIC ONCOLOGY | Facility: CLINIC | Age: 70
End: 2025-04-21
Payer: MEDICARE

## 2025-04-21 ENCOUNTER — INFUSION (OUTPATIENT)
Dept: HEMATOLOGY/ONCOLOGY | Facility: CLINIC | Age: 70
End: 2025-04-21
Payer: MEDICARE

## 2025-04-21 ENCOUNTER — SOCIAL WORK (OUTPATIENT)
Dept: HEMATOLOGY/ONCOLOGY | Facility: CLINIC | Age: 70
End: 2025-04-21

## 2025-04-21 VITALS
OXYGEN SATURATION: 96 % | SYSTOLIC BLOOD PRESSURE: 127 MMHG | RESPIRATION RATE: 18 BRPM | HEART RATE: 101 BPM | BODY MASS INDEX: 25.98 KG/M2 | WEIGHT: 136.91 LBS | TEMPERATURE: 97.7 F | DIASTOLIC BLOOD PRESSURE: 76 MMHG

## 2025-04-21 DIAGNOSIS — C54.1 MALIGNANT NEOPLASM OF ENDOMETRIUM (MULTI): Primary | ICD-10-CM

## 2025-04-21 DIAGNOSIS — Z51.11 CHEMOTHERAPY MANAGEMENT, ENCOUNTER FOR: ICD-10-CM

## 2025-04-21 DIAGNOSIS — C54.1 MALIGNANT NEOPLASM OF ENDOMETRIUM (MULTI): ICD-10-CM

## 2025-04-21 DIAGNOSIS — L65.8 CHEMOTHERAPY-INDUCED ALOPECIA: Primary | ICD-10-CM

## 2025-04-21 DIAGNOSIS — T45.1X5A CHEMOTHERAPY-INDUCED ALOPECIA: Primary | ICD-10-CM

## 2025-04-21 DIAGNOSIS — K59.00 CONSTIPATION, UNSPECIFIED CONSTIPATION TYPE: ICD-10-CM

## 2025-04-21 PROCEDURE — 96413 CHEMO IV INFUSION 1 HR: CPT

## 2025-04-21 PROCEDURE — 2500000004 HC RX 250 GENERAL PHARMACY W/ HCPCS (ALT 636 FOR OP/ED): Mod: JZ | Performed by: OBSTETRICS & GYNECOLOGY

## 2025-04-21 PROCEDURE — 96417 CHEMO IV INFUS EACH ADDL SEQ: CPT

## 2025-04-21 PROCEDURE — 2500000001 HC RX 250 WO HCPCS SELF ADMINISTERED DRUGS (ALT 637 FOR MEDICARE OP): Performed by: OBSTETRICS & GYNECOLOGY

## 2025-04-21 PROCEDURE — 2500000004 HC RX 250 GENERAL PHARMACY W/ HCPCS (ALT 636 FOR OP/ED): Performed by: OBSTETRICS & GYNECOLOGY

## 2025-04-21 PROCEDURE — 99215 OFFICE O/P EST HI 40 MIN: CPT | Performed by: NURSE PRACTITIONER

## 2025-04-21 PROCEDURE — G2211 COMPLEX E/M VISIT ADD ON: HCPCS | Performed by: NURSE PRACTITIONER

## 2025-04-21 PROCEDURE — 96377 APPLICATON ON-BODY INJECTOR: CPT

## 2025-04-21 PROCEDURE — 96415 CHEMO IV INFUSION ADDL HR: CPT

## 2025-04-21 PROCEDURE — 96367 TX/PROPH/DG ADDL SEQ IV INF: CPT

## 2025-04-21 PROCEDURE — 96375 TX/PRO/DX INJ NEW DRUG ADDON: CPT | Mod: INF

## 2025-04-21 RX ORDER — FAMOTIDINE 10 MG/ML
20 INJECTION, SOLUTION INTRAVENOUS ONCE
Status: COMPLETED | OUTPATIENT
Start: 2025-04-21 | End: 2025-04-21

## 2025-04-21 RX ORDER — DIPHENHYDRAMINE HCL 25 MG
50 CAPSULE ORAL ONCE
Status: COMPLETED | OUTPATIENT
Start: 2025-04-21 | End: 2025-04-21

## 2025-04-21 RX ORDER — PROCHLORPERAZINE EDISYLATE 5 MG/ML
10 INJECTION INTRAMUSCULAR; INTRAVENOUS EVERY 6 HOURS PRN
Status: DISCONTINUED | OUTPATIENT
Start: 2025-04-21 | End: 2025-04-21 | Stop reason: HOSPADM

## 2025-04-21 RX ORDER — PROCHLORPERAZINE MALEATE 10 MG
10 TABLET ORAL EVERY 6 HOURS PRN
Status: DISCONTINUED | OUTPATIENT
Start: 2025-04-21 | End: 2025-04-21 | Stop reason: HOSPADM

## 2025-04-21 RX ORDER — DIPHENHYDRAMINE HYDROCHLORIDE 50 MG/ML
50 INJECTION, SOLUTION INTRAMUSCULAR; INTRAVENOUS AS NEEDED
Status: DISCONTINUED | OUTPATIENT
Start: 2025-04-21 | End: 2025-04-21 | Stop reason: HOSPADM

## 2025-04-21 RX ORDER — ALBUTEROL SULFATE 0.83 MG/ML
3 SOLUTION RESPIRATORY (INHALATION) AS NEEDED
Status: DISCONTINUED | OUTPATIENT
Start: 2025-04-21 | End: 2025-04-21 | Stop reason: HOSPADM

## 2025-04-21 RX ORDER — PALONOSETRON 0.05 MG/ML
0.25 INJECTION, SOLUTION INTRAVENOUS ONCE
Status: COMPLETED | OUTPATIENT
Start: 2025-04-21 | End: 2025-04-21

## 2025-04-21 RX ORDER — SENNOSIDES 8.6 MG/1
8.6 CAPSULE, GELATIN COATED ORAL NIGHTLY
Qty: 30 CAPSULE | Refills: 2 | Status: SHIPPED | OUTPATIENT
Start: 2025-04-21

## 2025-04-21 RX ORDER — EPINEPHRINE 0.3 MG/.3ML
0.3 INJECTION SUBCUTANEOUS EVERY 5 MIN PRN
Status: DISCONTINUED | OUTPATIENT
Start: 2025-04-21 | End: 2025-04-21 | Stop reason: HOSPADM

## 2025-04-21 RX ORDER — FAMOTIDINE 10 MG/ML
20 INJECTION, SOLUTION INTRAVENOUS ONCE AS NEEDED
Status: DISCONTINUED | OUTPATIENT
Start: 2025-04-21 | End: 2025-04-21 | Stop reason: HOSPADM

## 2025-04-21 RX ADMIN — FOSAPREPITANT 150 MG: 150 INJECTION, POWDER, LYOPHILIZED, FOR SOLUTION INTRAVENOUS at 11:08

## 2025-04-21 RX ADMIN — DEXAMETHASONE SODIUM PHOSPHATE 12 MG: 10 INJECTION, SOLUTION INTRAMUSCULAR; INTRAVENOUS at 10:45

## 2025-04-21 RX ADMIN — FAMOTIDINE 20 MG: 10 INJECTION INTRAVENOUS at 10:44

## 2025-04-21 RX ADMIN — PEGFILGRASTIM 6 MG: KIT SUBCUTANEOUS at 16:09

## 2025-04-21 RX ADMIN — DIPHENHYDRAMINE HYDROCHLORIDE 50 MG: 25 CAPSULE ORAL at 10:42

## 2025-04-21 RX ADMIN — CARBOPLATIN 430 MG: 10 INJECTION INTRAVENOUS at 15:35

## 2025-04-21 RX ADMIN — PACLITAXEL 216 MG: 6 INJECTION INTRAVENOUS at 12:31

## 2025-04-21 RX ADMIN — PALONOSETRON 0.25 MG: 0.05 INJECTION, SOLUTION INTRAVENOUS at 10:43

## 2025-04-21 RX ADMIN — SODIUM CHLORIDE 200 MG: 9 INJECTION, SOLUTION INTRAVENOUS at 11:51

## 2025-04-21 ASSESSMENT — PAIN SCALES - GENERAL: PAINLEVEL_OUTOF10: 0-NO PAIN

## 2025-04-23 NOTE — PROGRESS NOTES
PSYCHOSOCIAL ASSESSMENT     Demographic Information  Mica Vanessa  1955  [id number]  Preferred Name: Mica  Assessment Type:  Initial  Date of assessment: 04/21/2025  Provider(s): Dr. Palacio  Diagnosis: Uterine Cancer  Person(s) present during assessment: patient  Primary language: english  Interpretive services used: none    Distress Thermometer  Distress Score: N/A  Distress Concerns: N/A                Living Environment/Support Systems  Partner Status: Seperated  Children: two sons and daughter  Support systems: family and friends  Primary caregiver: Self  Current Living Situation: House Own  Resides with: self  Concerns with Housing Environment: None  Comments:     Safety  Patient safe at home? yes  History of Domestic Violence: no history of domestic violence was disclosed      Functional Status  Functional status: Independent  Patient currently ambulates: Independently  Patient has following equipment: None  Other physical health issues that the patient is experiencing: no other health concerns noted at this time  What supports are in place to assist the patient: None  Transportation:  Self  Comments:         Finances/Insurance  Insurance: [facility] Medicare  Does the patient have any pending insurance applications: No   Hospital Financial Assistance: None  Patient's income source: Employment and long term  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 No   Applicable [place]: No   Comments:      Advance Directives  Advance Directives were not disclosed at this time  Health Care Agent Status:Not Activated  Health Care Agent, When applicable:   Comments:    Legal Involvement  Relevant current or previous legal concerns: No current legal concerns noted at this time     Zoroastrianism or Spiritual Identity  Comments: Patient  identifies as [facility]    Mental Health  Active SI/HI: No  Mental Health Diagnosis, if applicable:   Mood: Appropriate for the situation  Concerns relating to substance use (including alcohol/tobacco):   Patient identified coping skills: appropriate for the situation  Cognitive Comments: No cognitive deficits noted  Relevant Providers:   Comments:       Assessment  Potential Barriers to Care:  None Identified  Patient Strengths:  Healthy Coping Skills, Motivated for Treatment, Self-Advocate, and Strong Support System    Plan  Referrals: [facility]  Applications: N/A  Other: N/A    Narrative: LSW met with patient during her infusion visit.  Patient is a 69 year old female diagnosed with recurrent uterine cancer, she follows with Dr. Palacio.  Patient open to meeting with  and was easily engaged in conversation.  Patient also has a history of breast cancer, where she received treatment but states that it was years ago. She tolerated chemotherapy well at that time.  Patient was later diagnosed with uterine cancer, received surgery but did not need any additional treatment after surgery.  Patient had been doing well the last few years but then noticed some mild spotting and after an exam was found that her cancer recurred and there is also a met in her lung.  Patient does endorse some mild anxiety but it is well controlled with medication.    Patient shared that she currently works part-time at [employer] as a .  She states that she enjoys her job and is hoping to continue working through treatment.  She shared that she has supportive children, a daughter who lives out of state and two sons who live locally.  She also has three grandchildren that she helps watch from time to time.  Patient is independent in her care and at the present does not have any homecare or assistive devices in the home.  She is able to drive herself to appointments and denies that transportation is an  issue.  Patient is interested in supportive programs.  She has reached out to the [facility] regarding support groups but has not heard back from the coordinator yet as to when the next group will be.  LSW provided patient with the Spring programming guide and shared that there is a general cancer support group on 4/24/25 that patient could attend.  She was appreciative of this information and states that she will look further into the group.      Patient also provided information on wig resources.  She recently purchased a wig but wanted to look into reimbursement from her insurance.  Provided her with a copy of a prescription for a wig that she could submit to her insurance.  Patient also referred to the [facility] as they have a wig salon she could schedule an appointment with to obtain a free wig.  Patient will look further into this resource.    No other issues or concerns noted at this time.  Support provided during visit and contact information provided to patient.  Social work remains available going forward.

## 2025-04-28 ENCOUNTER — APPOINTMENT (OUTPATIENT)
Dept: GYNECOLOGIC ONCOLOGY | Facility: CLINIC | Age: 70
End: 2025-04-28
Payer: MEDICARE

## 2025-05-07 ENCOUNTER — APPOINTMENT (OUTPATIENT)
Dept: PRIMARY CARE | Facility: CLINIC | Age: 70
End: 2025-05-07
Payer: MEDICARE

## 2025-05-07 VITALS
TEMPERATURE: 98.2 F | BODY MASS INDEX: 26.28 KG/M2 | HEART RATE: 96 BPM | WEIGHT: 138.5 LBS | SYSTOLIC BLOOD PRESSURE: 124 MMHG | DIASTOLIC BLOOD PRESSURE: 73 MMHG | OXYGEN SATURATION: 95 % | RESPIRATION RATE: 16 BRPM

## 2025-05-07 DIAGNOSIS — Z11.4 ENCOUNTER FOR SCREENING FOR HIV: Primary | ICD-10-CM

## 2025-05-07 DIAGNOSIS — I10 PRIMARY HYPERTENSION: ICD-10-CM

## 2025-05-07 DIAGNOSIS — F41.9 ANXIETY: ICD-10-CM

## 2025-05-07 DIAGNOSIS — M79.605 PAIN OF LEFT LOWER EXTREMITY: ICD-10-CM

## 2025-05-07 PROCEDURE — 1036F TOBACCO NON-USER: CPT

## 2025-05-07 PROCEDURE — 1159F MED LIST DOCD IN RCRD: CPT

## 2025-05-07 PROCEDURE — 3078F DIAST BP <80 MM HG: CPT

## 2025-05-07 PROCEDURE — 3074F SYST BP LT 130 MM HG: CPT

## 2025-05-07 PROCEDURE — 99213 OFFICE O/P EST LOW 20 MIN: CPT

## 2025-05-07 PROCEDURE — G2211 COMPLEX E/M VISIT ADD ON: HCPCS

## 2025-05-07 NOTE — PROGRESS NOTES
Subjective   Mica Vanessa is a 69 y.o. female who presents for Medication Visit (Pt here today for 3 month F/U).  HPI  Endometrial cancer, met to the lung  Undergoing chemo, has done 2 sessions and tolerating mostly well. Her breathing has been okay, some sob with heavy exertion. Lots of phlegm, does have spring allergies. She uses claritin daily. Using pseudophed PRN, has been using Flonase. Has had some worsening in urinary leakage which is chronic, some increased frequency/amount of urination but no burning or discomfort with urination.  She is following with her oncologist who initially did her hysterectomy, no known plans for radiation at this point.    L hip/thigh pain  Patient is endorsing left-sided lateral thigh pain ongoing for several months.  It is worse when sitting for prolonged periods or sleeping on the left. Improves with moving around. Occasionally goes down her leg but no radiating numbness, tingling, weakness. She does have some back pain as well.  No symptoms in the right leg.  She is very active, bikes and walks frequently.  No pain directly over the joint or elicited by certain movements.    Anxiety  She does feel Lexapro has been helpful, has not had to use Ativan.  She does not feel like we need to adjust the dose at all, is responding well and feels more levelheaded.        Visit Vitals  /73 (BP Location: Right arm, Patient Position: Sitting)   Pulse 96   Temp 36.8 °C (98.2 °F) (Temporal)   Resp 16      Objective   Physical Exam  Constitutional:       Appearance: She is not ill-appearing.   Eyes:      General: No scleral icterus.     Conjunctiva/sclera: Conjunctivae normal.   Cardiovascular:      Rate and Rhythm: Normal rate and regular rhythm.   Pulmonary:      Effort: Pulmonary effort is normal.      Breath sounds: No wheezing, rhonchi or rales.   Abdominal:      General: Abdomen is flat. There is no distension.      Tenderness: There is no abdominal tenderness.   Musculoskeletal:          General: No swelling.      Comments: No tenderness to palpation of the hip or long bones on the left  Full range of motion of the leg with normal strength and sensation  Negative straight leg raise   Skin:     General: Skin is warm and dry.      Coloration: Skin is not jaundiced.   Neurological:      General: No focal deficit present.      Mental Status: She is alert.   Psychiatric:         Mood and Affect: Mood normal.         Assessment & Plan  Encounter for screening for HIV  Requests one time screening for HIV.  Orders:    HIV 1/2 Antigen/Antibody Screen with Reflex to Confirmation; Future    Primary hypertension  Stable, well controlled.  Orders:    Follow Up In [facility] - PCP; Future    Anxiety  Well controlled with 10 mg lexapro, does not wish to change the dose. She is coping well. No safety concerns. We will follow up on this in three months or sooner as needed.       Pain of left lower extremity  Cramping sensation in lateral leg concerning for IT band syndrome vs possible referred hip arthritis/bursitis pain. Discussed the options of PT or XR for further evaluation but it is not very bothersome for now, she prefers to hold off while going through other treatments. Follow up if symptoms worsen. Recommended stretching regularly for now, staying well hydrated. We will follow up on her labs with oncology to assess for electrolyte imbalance which could be contributing.                  Jio Valle DO  Family Medicine Resident, PGY2     05/07/25 7:43 PM

## 2025-05-08 NOTE — PROGRESS NOTES
I saw and evaluated the patient. I personally obtained the key and critical portions of the history and physical exam or was physically present for key and critical portions performed by the resident/fellow. I reviewed the resident/fellow's documentation and discussed the patient with the resident/fellow. I agree with the resident/fellow's medical decision making as documented in the note.    Jonas Kelly, DO

## 2025-05-09 ENCOUNTER — LAB (OUTPATIENT)
Dept: LAB | Facility: CLINIC | Age: 70
End: 2025-05-09
Payer: MEDICARE

## 2025-05-09 DIAGNOSIS — C54.1 MALIGNANT NEOPLASM OF ENDOMETRIUM (MULTI): ICD-10-CM

## 2025-05-09 LAB
ALBUMIN SERPL BCP-MCNC: 4.1 G/DL (ref 3.4–5)
ALP SERPL-CCNC: 82 U/L (ref 33–136)
ALT SERPL W P-5'-P-CCNC: 17 U/L (ref 7–45)
ANION GAP SERPL CALC-SCNC: 10 MMOL/L (ref 10–20)
AST SERPL W P-5'-P-CCNC: 14 U/L (ref 9–39)
BASOPHILS # BLD AUTO: 0.03 X10*3/UL (ref 0–0.1)
BASOPHILS NFR BLD AUTO: 0.9 %
BILIRUB SERPL-MCNC: 0.4 MG/DL (ref 0–1.2)
BUN SERPL-MCNC: 18 MG/DL (ref 6–23)
CALCIUM SERPL-MCNC: 9.3 MG/DL (ref 8.6–10.3)
CHLORIDE SERPL-SCNC: 103 MMOL/L (ref 98–107)
CO2 SERPL-SCNC: 30 MMOL/L (ref 21–32)
CREAT SERPL-MCNC: 0.52 MG/DL (ref 0.5–1.05)
EGFRCR SERPLBLD CKD-EPI 2021: >90 ML/MIN/1.73M*2
EOSINOPHIL # BLD AUTO: 0.04 X10*3/UL (ref 0–0.7)
EOSINOPHIL NFR BLD AUTO: 1.2 %
ERYTHROCYTE [DISTWIDTH] IN BLOOD BY AUTOMATED COUNT: 14.7 % (ref 11.5–14.5)
GLUCOSE SERPL-MCNC: 79 MG/DL (ref 74–99)
HCT VFR BLD AUTO: 37.2 % (ref 36–46)
HGB BLD-MCNC: 12.2 G/DL (ref 12–16)
IMM GRANULOCYTES # BLD AUTO: 0.01 X10*3/UL (ref 0–0.7)
IMM GRANULOCYTES NFR BLD AUTO: 0.3 % (ref 0–0.9)
LYMPHOCYTES # BLD AUTO: 0.53 X10*3/UL (ref 1.2–4.8)
LYMPHOCYTES NFR BLD AUTO: 16.3 %
MAGNESIUM SERPL-MCNC: 1.7 MG/DL (ref 1.6–2.4)
MCH RBC QN AUTO: 30.7 PG (ref 26–34)
MCHC RBC AUTO-ENTMCNC: 32.8 G/DL (ref 32–36)
MCV RBC AUTO: 94 FL (ref 80–100)
MONOCYTES # BLD AUTO: 0.3 X10*3/UL (ref 0.1–1)
MONOCYTES NFR BLD AUTO: 9.2 %
NEUTROPHILS # BLD AUTO: 2.34 X10*3/UL (ref 1.2–7.7)
NEUTROPHILS NFR BLD AUTO: 72.1 %
PLATELET # BLD AUTO: 248 X10*3/UL (ref 150–450)
POTASSIUM SERPL-SCNC: 4.1 MMOL/L (ref 3.5–5.3)
PROT SERPL-MCNC: 6.6 G/DL (ref 6.4–8.2)
RBC # BLD AUTO: 3.98 X10*6/UL (ref 4–5.2)
SODIUM SERPL-SCNC: 139 MMOL/L (ref 136–145)
TSH SERPL-ACNC: 1.61 MIU/L (ref 0.44–3.98)
WBC # BLD AUTO: 3.3 X10*3/UL (ref 4.4–11.3)

## 2025-05-09 PROCEDURE — 86304 IMMUNOASSAY TUMOR CA 125: CPT

## 2025-05-09 PROCEDURE — 82533 TOTAL CORTISOL: CPT

## 2025-05-09 PROCEDURE — 87389 HIV-1 AG W/HIV-1&-2 AB AG IA: CPT | Performed by: FAMILY MEDICINE

## 2025-05-09 PROCEDURE — 36415 COLL VENOUS BLD VENIPUNCTURE: CPT

## 2025-05-09 PROCEDURE — 82024 ASSAY OF ACTH: CPT

## 2025-05-09 PROCEDURE — 80053 COMPREHEN METABOLIC PANEL: CPT

## 2025-05-09 PROCEDURE — 85025 COMPLETE CBC W/AUTO DIFF WBC: CPT

## 2025-05-09 PROCEDURE — 83735 ASSAY OF MAGNESIUM: CPT

## 2025-05-09 PROCEDURE — 84443 ASSAY THYROID STIM HORMONE: CPT

## 2025-05-10 LAB
CANCER AG125 SERPL-ACNC: 17 U/ML (ref 0–30.2)
CORTIS AM PEAK SERPL-MSCNC: 15 UG/DL (ref 5–20)
HIV 1+2 AB+HIV1 P24 AG SERPL QL IA: NONREACTIVE

## 2025-05-11 RX ORDER — FAMOTIDINE 10 MG/ML
20 INJECTION, SOLUTION INTRAVENOUS ONCE AS NEEDED
Status: CANCELLED | OUTPATIENT
Start: 2025-05-12

## 2025-05-11 RX ORDER — FAMOTIDINE 10 MG/ML
20 INJECTION, SOLUTION INTRAVENOUS ONCE
Status: CANCELLED | OUTPATIENT
Start: 2025-05-12

## 2025-05-11 RX ORDER — DIPHENHYDRAMINE HYDROCHLORIDE 50 MG/ML
50 INJECTION, SOLUTION INTRAMUSCULAR; INTRAVENOUS AS NEEDED
Status: CANCELLED | OUTPATIENT
Start: 2025-05-12

## 2025-05-11 RX ORDER — ALBUTEROL SULFATE 0.83 MG/ML
3 SOLUTION RESPIRATORY (INHALATION) AS NEEDED
Status: CANCELLED | OUTPATIENT
Start: 2025-05-12

## 2025-05-11 RX ORDER — EPINEPHRINE 0.3 MG/.3ML
0.3 INJECTION SUBCUTANEOUS EVERY 5 MIN PRN
Status: CANCELLED | OUTPATIENT
Start: 2025-05-12

## 2025-05-11 RX ORDER — PALONOSETRON 0.05 MG/ML
0.25 INJECTION, SOLUTION INTRAVENOUS ONCE
Status: CANCELLED | OUTPATIENT
Start: 2025-05-12

## 2025-05-11 RX ORDER — PROCHLORPERAZINE EDISYLATE 5 MG/ML
10 INJECTION INTRAMUSCULAR; INTRAVENOUS EVERY 6 HOURS PRN
Status: CANCELLED | OUTPATIENT
Start: 2025-05-12

## 2025-05-11 RX ORDER — DIPHENHYDRAMINE HCL 25 MG
50 CAPSULE ORAL ONCE
Status: CANCELLED | OUTPATIENT
Start: 2025-05-12

## 2025-05-11 RX ORDER — PROCHLORPERAZINE MALEATE 10 MG
10 TABLET ORAL EVERY 6 HOURS PRN
Status: CANCELLED | OUTPATIENT
Start: 2025-05-12

## 2025-05-11 NOTE — PROGRESS NOTES
Patient ID: Mica Vanessa is a 69 y.o. female.  Referring Physician: Nannette Palacio MD  [address] [place] [address]  Primary Care Provider: Joi Valle DO    Subjective    HPI    Interval History:  Reports 4-5 days post chemo she is experiencing body aches, occasional headaches, allergies, and a productive cough. Additionally noting tendonopathy pain on 1 side of her back and leg, treating with Dexamethasone, Ibuprofen and a heating pad. She is following up with her PCP and asked if she is safe taking a supplement to treat too. Denies nausea. We discussed her treatment plan too.    Objective    BSA: 1.64 meters squared  /73   Pulse 97   Temp 36.4 °C (97.5 °F)   Resp 17   Wt 62.3 kg (137 lb 5.6 oz)   SpO2 95%   BMI 26.07 kg/m²      Physical Exam  Constitutional:       General: She is not in acute distress.     Appearance: Normal appearance.   Cardiovascular:      Rate and Rhythm: Normal rate and regular rhythm.   Pulmonary:      Effort: Pulmonary effort is normal.      Breath sounds: Normal breath sounds.   Abdominal:      General: Bowel sounds are normal. There is no distension.   Musculoskeletal:      Right forearm: Normal.      Left forearm: Normal.      Right hand: Normal.      Left hand: Normal.      Right lower leg: Normal.      Left lower leg: Normal.      Right foot: Normal.      Left foot: Normal.         Performance Status:  Symptomatic; fully ambulatory    Assessment/Plan     Oncology History Overview Note   Cancer history:  10/6/23 EMB w/ uterine cancer, mesonephric type  10/21/23 Total laparoscopic hysterectomy bilateral salpingo oophorectomy, Bilateral sentinel lymph node injection and mapping, Left pelvic sentinel lymph node dissection and right complete pelvic lymphadenectomy, Cystourethroscopy.  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 MMRp, P53 alison type. TB recommendations for radiation oncology consult, genetics referral,  consider systemic therapy  Adjuvant pelvic RT  7/2024 - new pulmonary nodules, all less than 1cm  10/2024 CT chest stable  3/2025 biopsy of lung nodule - c/w metastatic uterine cancer     Malignant neoplasm of endometrium (Multi)   11/10/2023 Initial Diagnosis    Malignant neoplasm of endometrium (Multi)     3/31/2025 -  Chemotherapy    Pembrolizumab + PACLitaxel / CARBOplatin, 21 Day Cycles followed by Pembrolizumab, 42 Day Cycles - Gyn           Problem List Items Addressed This Visit           ICD-10-CM    Malignant neoplasm of endometrium (Multi) C54.1    Relevant Orders    Clinic Appointment Request (Completed)    CT chest abdomen pelvis w IV contrast    Chemotherapy management, encounter for - Primary Z51.11       Treatment Plans       Name Type Plan Dates Plan Provider         Active    Pembrolizumab + PACLitaxel / CARBOplatin, 21 Day Cycles followed by Pembrolizumab, 42 Day Cycles - Gyn  Oncology Treatment 3/28/2025 - Present Nannette Palacio MD                  - Ordered CT chest/abdomen/pelvis for after this cycle to assess response.  Labs appropriate for tx today.  - Patient here today for assessment and chemotherapy treatment.  - She will follow chemotherapy calendar.      Scribe Attestation  By signing my name below, IDyana Scribe   attest that this documentation has been prepared under the direction and in the presence of Nannette Palacio MD.

## 2025-05-12 ENCOUNTER — INFUSION (OUTPATIENT)
Dept: HEMATOLOGY/ONCOLOGY | Facility: CLINIC | Age: 70
End: 2025-05-12
Payer: MEDICARE

## 2025-05-12 ENCOUNTER — OFFICE VISIT (OUTPATIENT)
Dept: GYNECOLOGIC ONCOLOGY | Facility: CLINIC | Age: 70
End: 2025-05-12
Payer: MEDICARE

## 2025-05-12 VITALS
TEMPERATURE: 97.5 F | BODY MASS INDEX: 26.07 KG/M2 | RESPIRATION RATE: 17 BRPM | HEART RATE: 97 BPM | WEIGHT: 137.35 LBS | OXYGEN SATURATION: 95 % | SYSTOLIC BLOOD PRESSURE: 128 MMHG | DIASTOLIC BLOOD PRESSURE: 73 MMHG

## 2025-05-12 DIAGNOSIS — C54.1 MALIGNANT NEOPLASM OF ENDOMETRIUM (MULTI): ICD-10-CM

## 2025-05-12 DIAGNOSIS — Z51.11 CHEMOTHERAPY MANAGEMENT, ENCOUNTER FOR: Primary | ICD-10-CM

## 2025-05-12 PROCEDURE — 99215 OFFICE O/P EST HI 40 MIN: CPT | Performed by: OBSTETRICS & GYNECOLOGY

## 2025-05-12 PROCEDURE — 2500000004 HC RX 250 GENERAL PHARMACY W/ HCPCS (ALT 636 FOR OP/ED): Mod: JZ | Performed by: OBSTETRICS & GYNECOLOGY

## 2025-05-12 PROCEDURE — 2500000001 HC RX 250 WO HCPCS SELF ADMINISTERED DRUGS (ALT 637 FOR MEDICARE OP): Performed by: OBSTETRICS & GYNECOLOGY

## 2025-05-12 PROCEDURE — 1160F RVW MEDS BY RX/DR IN RCRD: CPT | Performed by: OBSTETRICS & GYNECOLOGY

## 2025-05-12 PROCEDURE — 99215 OFFICE O/P EST HI 40 MIN: CPT | Mod: 25 | Performed by: OBSTETRICS & GYNECOLOGY

## 2025-05-12 PROCEDURE — G2211 COMPLEX E/M VISIT ADD ON: HCPCS | Performed by: OBSTETRICS & GYNECOLOGY

## 2025-05-12 PROCEDURE — 2500000004 HC RX 250 GENERAL PHARMACY W/ HCPCS (ALT 636 FOR OP/ED): Performed by: OBSTETRICS & GYNECOLOGY

## 2025-05-12 PROCEDURE — 3074F SYST BP LT 130 MM HG: CPT | Performed by: OBSTETRICS & GYNECOLOGY

## 2025-05-12 PROCEDURE — 96375 TX/PRO/DX INJ NEW DRUG ADDON: CPT | Mod: INF

## 2025-05-12 PROCEDURE — 3078F DIAST BP <80 MM HG: CPT | Performed by: OBSTETRICS & GYNECOLOGY

## 2025-05-12 PROCEDURE — 1159F MED LIST DOCD IN RCRD: CPT | Performed by: OBSTETRICS & GYNECOLOGY

## 2025-05-12 PROCEDURE — 96377 APPLICATON ON-BODY INJECTOR: CPT | Mod: 59

## 2025-05-12 PROCEDURE — 96415 CHEMO IV INFUSION ADDL HR: CPT

## 2025-05-12 PROCEDURE — 1126F AMNT PAIN NOTED NONE PRSNT: CPT | Performed by: OBSTETRICS & GYNECOLOGY

## 2025-05-12 PROCEDURE — 96417 CHEMO IV INFUS EACH ADDL SEQ: CPT

## 2025-05-12 PROCEDURE — 96367 TX/PROPH/DG ADDL SEQ IV INF: CPT

## 2025-05-12 PROCEDURE — 96413 CHEMO IV INFUSION 1 HR: CPT

## 2025-05-12 RX ORDER — DIPHENHYDRAMINE HCL 25 MG
50 CAPSULE ORAL ONCE
Status: COMPLETED | OUTPATIENT
Start: 2025-05-12 | End: 2025-05-12

## 2025-05-12 RX ORDER — DIPHENHYDRAMINE HYDROCHLORIDE 50 MG/ML
50 INJECTION, SOLUTION INTRAMUSCULAR; INTRAVENOUS AS NEEDED
Status: DISCONTINUED | OUTPATIENT
Start: 2025-05-12 | End: 2025-05-13 | Stop reason: HOSPADM

## 2025-05-12 RX ORDER — ALBUTEROL SULFATE 0.83 MG/ML
3 SOLUTION RESPIRATORY (INHALATION) AS NEEDED
Status: DISCONTINUED | OUTPATIENT
Start: 2025-05-12 | End: 2025-05-13 | Stop reason: HOSPADM

## 2025-05-12 RX ORDER — PROCHLORPERAZINE EDISYLATE 5 MG/ML
10 INJECTION INTRAMUSCULAR; INTRAVENOUS EVERY 6 HOURS PRN
Status: DISCONTINUED | OUTPATIENT
Start: 2025-05-12 | End: 2025-05-13 | Stop reason: HOSPADM

## 2025-05-12 RX ORDER — FAMOTIDINE 10 MG/ML
20 INJECTION, SOLUTION INTRAVENOUS ONCE AS NEEDED
Status: DISCONTINUED | OUTPATIENT
Start: 2025-05-12 | End: 2025-05-13 | Stop reason: HOSPADM

## 2025-05-12 RX ORDER — EPINEPHRINE 0.3 MG/.3ML
0.3 INJECTION SUBCUTANEOUS EVERY 5 MIN PRN
Status: DISCONTINUED | OUTPATIENT
Start: 2025-05-12 | End: 2025-05-13 | Stop reason: HOSPADM

## 2025-05-12 RX ORDER — FAMOTIDINE 10 MG/ML
20 INJECTION, SOLUTION INTRAVENOUS ONCE
Status: COMPLETED | OUTPATIENT
Start: 2025-05-12 | End: 2025-05-12

## 2025-05-12 RX ORDER — PROCHLORPERAZINE MALEATE 10 MG
10 TABLET ORAL EVERY 6 HOURS PRN
Status: DISCONTINUED | OUTPATIENT
Start: 2025-05-12 | End: 2025-05-13 | Stop reason: HOSPADM

## 2025-05-12 RX ORDER — PALONOSETRON 0.05 MG/ML
0.25 INJECTION, SOLUTION INTRAVENOUS ONCE
Status: COMPLETED | OUTPATIENT
Start: 2025-05-12 | End: 2025-05-12

## 2025-05-12 RX ADMIN — SODIUM CHLORIDE 200 MG: 9 INJECTION, SOLUTION INTRAVENOUS at 10:02

## 2025-05-12 RX ADMIN — DEXAMETHASONE SODIUM PHOSPHATE 12 MG: 10 INJECTION, SOLUTION INTRAMUSCULAR; INTRAVENOUS at 09:01

## 2025-05-12 RX ADMIN — PACLITAXEL 216 MG: 6 INJECTION INTRAVENOUS at 10:44

## 2025-05-12 RX ADMIN — FAMOTIDINE 20 MG: 10 INJECTION INTRAVENOUS at 08:59

## 2025-05-12 RX ADMIN — PALONOSETRON HYDROCHLORIDE 0.25 MG: 0.25 INJECTION INTRAVENOUS at 08:54

## 2025-05-12 RX ADMIN — PEGFILGRASTIM 6 MG: KIT SUBCUTANEOUS at 13:46

## 2025-05-12 RX ADMIN — FOSAPREPITANT 150 MG: 150 INJECTION, POWDER, LYOPHILIZED, FOR SOLUTION INTRAVENOUS at 09:23

## 2025-05-12 RX ADMIN — CARBOPLATIN 430 MG: 10 INJECTION INTRAVENOUS at 13:43

## 2025-05-12 RX ADMIN — DIPHENHYDRAMINE HYDROCHLORIDE 50 MG: 25 CAPSULE ORAL at 08:51

## 2025-05-12 ASSESSMENT — PAIN SCALES - GENERAL: PAINLEVEL_OUTOF10: 0-NO PAIN

## 2025-05-13 LAB — ACTH PLAS-MCNC: 12.2 PG/ML (ref 7.2–63.3)

## 2025-05-20 DIAGNOSIS — C54.1 ENDOMETRIAL CANCER (MULTI): ICD-10-CM

## 2025-05-28 ENCOUNTER — HOSPITAL ENCOUNTER (OUTPATIENT)
Dept: RADIOLOGY | Facility: HOSPITAL | Age: 70
Discharge: HOME | End: 2025-05-28
Payer: MEDICARE

## 2025-05-28 DIAGNOSIS — C54.1 MALIGNANT NEOPLASM OF ENDOMETRIUM (MULTI): ICD-10-CM

## 2025-05-28 PROCEDURE — 71260 CT THORAX DX C+: CPT

## 2025-05-28 PROCEDURE — A9698 NON-RAD CONTRAST MATERIALNOC: HCPCS | Performed by: OBSTETRICS & GYNECOLOGY

## 2025-05-28 PROCEDURE — 2550000001 HC RX 255 CONTRASTS: Performed by: OBSTETRICS & GYNECOLOGY

## 2025-05-28 RX ADMIN — IOHEXOL 500 ML: 12 SOLUTION ORAL at 15:32

## 2025-05-28 RX ADMIN — IOHEXOL 75 ML: 350 INJECTION, SOLUTION INTRAVENOUS at 15:32

## 2025-05-30 ENCOUNTER — LAB (OUTPATIENT)
Dept: LAB | Facility: CLINIC | Age: 70
End: 2025-05-30
Payer: MEDICARE

## 2025-05-30 DIAGNOSIS — C54.1 MALIGNANT NEOPLASM OF ENDOMETRIUM (MULTI): ICD-10-CM

## 2025-05-30 LAB
ALBUMIN SERPL BCP-MCNC: 4.5 G/DL (ref 3.4–5)
ALP SERPL-CCNC: 72 U/L (ref 33–136)
ALT SERPL W P-5'-P-CCNC: 13 U/L (ref 7–45)
ANION GAP SERPL CALC-SCNC: 11 MMOL/L (ref 10–20)
AST SERPL W P-5'-P-CCNC: 15 U/L (ref 9–39)
BASOPHILS # BLD AUTO: 0.02 X10*3/UL (ref 0–0.1)
BASOPHILS NFR BLD AUTO: 0.3 %
BILIRUB SERPL-MCNC: 0.3 MG/DL (ref 0–1.2)
BUN SERPL-MCNC: 22 MG/DL (ref 6–23)
CALCIUM SERPL-MCNC: 9.6 MG/DL (ref 8.6–10.3)
CHLORIDE SERPL-SCNC: 103 MMOL/L (ref 98–107)
CO2 SERPL-SCNC: 29 MMOL/L (ref 21–32)
CREAT SERPL-MCNC: 0.55 MG/DL (ref 0.5–1.05)
EGFRCR SERPLBLD CKD-EPI 2021: >90 ML/MIN/1.73M*2
EOSINOPHIL # BLD AUTO: 0.07 X10*3/UL (ref 0–0.7)
EOSINOPHIL NFR BLD AUTO: 1.2 %
ERYTHROCYTE [DISTWIDTH] IN BLOOD BY AUTOMATED COUNT: 14.7 % (ref 11.5–14.5)
GLUCOSE SERPL-MCNC: 84 MG/DL (ref 74–99)
HCT VFR BLD AUTO: 36.6 % (ref 36–46)
HGB BLD-MCNC: 11.9 G/DL (ref 12–16)
IMM GRANULOCYTES # BLD AUTO: 0.02 X10*3/UL (ref 0–0.7)
IMM GRANULOCYTES NFR BLD AUTO: 0.3 % (ref 0–0.9)
LYMPHOCYTES # BLD AUTO: 0.86 X10*3/UL (ref 1.2–4.8)
LYMPHOCYTES NFR BLD AUTO: 14.3 %
MAGNESIUM SERPL-MCNC: 1.8 MG/DL (ref 1.6–2.4)
MCH RBC QN AUTO: 31 PG (ref 26–34)
MCHC RBC AUTO-ENTMCNC: 32.5 G/DL (ref 32–36)
MCV RBC AUTO: 95 FL (ref 80–100)
MONOCYTES # BLD AUTO: 0.41 X10*3/UL (ref 0.1–1)
MONOCYTES NFR BLD AUTO: 6.8 %
NEUTROPHILS # BLD AUTO: 4.65 X10*3/UL (ref 1.2–7.7)
NEUTROPHILS NFR BLD AUTO: 77.1 %
PLATELET # BLD AUTO: 258 X10*3/UL (ref 150–450)
POTASSIUM SERPL-SCNC: 4.4 MMOL/L (ref 3.5–5.3)
PROT SERPL-MCNC: 6.7 G/DL (ref 6.4–8.2)
RBC # BLD AUTO: 3.84 X10*6/UL (ref 4–5.2)
SODIUM SERPL-SCNC: 139 MMOL/L (ref 136–145)
WBC # BLD AUTO: 6 X10*3/UL (ref 4.4–11.3)

## 2025-05-30 PROCEDURE — 85025 COMPLETE CBC W/AUTO DIFF WBC: CPT

## 2025-05-30 PROCEDURE — 86304 IMMUNOASSAY TUMOR CA 125: CPT

## 2025-05-30 PROCEDURE — 83735 ASSAY OF MAGNESIUM: CPT

## 2025-05-30 PROCEDURE — 36415 COLL VENOUS BLD VENIPUNCTURE: CPT

## 2025-05-30 PROCEDURE — 80053 COMPREHEN METABOLIC PANEL: CPT

## 2025-05-31 LAB — CANCER AG125 SERPL-ACNC: 28.5 U/ML (ref 0–30.2)

## 2025-06-02 ENCOUNTER — OFFICE VISIT (OUTPATIENT)
Dept: GYNECOLOGIC ONCOLOGY | Facility: CLINIC | Age: 70
End: 2025-06-02
Payer: MEDICARE

## 2025-06-02 ENCOUNTER — INFUSION (OUTPATIENT)
Dept: HEMATOLOGY/ONCOLOGY | Facility: CLINIC | Age: 70
End: 2025-06-02
Payer: MEDICARE

## 2025-06-02 VITALS
RESPIRATION RATE: 16 BRPM | HEART RATE: 94 BPM | SYSTOLIC BLOOD PRESSURE: 135 MMHG | DIASTOLIC BLOOD PRESSURE: 83 MMHG | OXYGEN SATURATION: 98 % | WEIGHT: 137.35 LBS | BODY MASS INDEX: 26.07 KG/M2 | TEMPERATURE: 97.7 F

## 2025-06-02 DIAGNOSIS — Z51.11 CHEMOTHERAPY MANAGEMENT, ENCOUNTER FOR: Primary | ICD-10-CM

## 2025-06-02 DIAGNOSIS — C54.1 MALIGNANT NEOPLASM OF ENDOMETRIUM (MULTI): ICD-10-CM

## 2025-06-02 PROCEDURE — 1036F TOBACCO NON-USER: CPT | Performed by: OBSTETRICS & GYNECOLOGY

## 2025-06-02 PROCEDURE — 96415 CHEMO IV INFUSION ADDL HR: CPT

## 2025-06-02 PROCEDURE — 2500000004 HC RX 250 GENERAL PHARMACY W/ HCPCS (ALT 636 FOR OP/ED): Performed by: OBSTETRICS & GYNECOLOGY

## 2025-06-02 PROCEDURE — 2500000004 HC RX 250 GENERAL PHARMACY W/ HCPCS (ALT 636 FOR OP/ED): Mod: JZ,TB | Performed by: OBSTETRICS & GYNECOLOGY

## 2025-06-02 PROCEDURE — 3075F SYST BP GE 130 - 139MM HG: CPT | Performed by: OBSTETRICS & GYNECOLOGY

## 2025-06-02 PROCEDURE — 1126F AMNT PAIN NOTED NONE PRSNT: CPT | Performed by: OBSTETRICS & GYNECOLOGY

## 2025-06-02 PROCEDURE — 1160F RVW MEDS BY RX/DR IN RCRD: CPT | Performed by: OBSTETRICS & GYNECOLOGY

## 2025-06-02 PROCEDURE — 2500000001 HC RX 250 WO HCPCS SELF ADMINISTERED DRUGS (ALT 637 FOR MEDICARE OP): Performed by: OBSTETRICS & GYNECOLOGY

## 2025-06-02 PROCEDURE — 96367 TX/PROPH/DG ADDL SEQ IV INF: CPT

## 2025-06-02 PROCEDURE — 3079F DIAST BP 80-89 MM HG: CPT | Performed by: OBSTETRICS & GYNECOLOGY

## 2025-06-02 PROCEDURE — 99215 OFFICE O/P EST HI 40 MIN: CPT | Performed by: OBSTETRICS & GYNECOLOGY

## 2025-06-02 PROCEDURE — 96417 CHEMO IV INFUS EACH ADDL SEQ: CPT

## 2025-06-02 PROCEDURE — 1159F MED LIST DOCD IN RCRD: CPT | Performed by: OBSTETRICS & GYNECOLOGY

## 2025-06-02 PROCEDURE — 96413 CHEMO IV INFUSION 1 HR: CPT

## 2025-06-02 PROCEDURE — 96375 TX/PRO/DX INJ NEW DRUG ADDON: CPT | Mod: INF

## 2025-06-02 PROCEDURE — G2211 COMPLEX E/M VISIT ADD ON: HCPCS | Performed by: OBSTETRICS & GYNECOLOGY

## 2025-06-02 PROCEDURE — 96377 APPLICATON ON-BODY INJECTOR: CPT | Mod: 59

## 2025-06-02 RX ORDER — FAMOTIDINE 10 MG/ML
20 INJECTION, SOLUTION INTRAVENOUS ONCE
Status: COMPLETED | OUTPATIENT
Start: 2025-06-02 | End: 2025-06-02

## 2025-06-02 RX ORDER — EPINEPHRINE 0.3 MG/.3ML
0.3 INJECTION SUBCUTANEOUS EVERY 5 MIN PRN
Status: CANCELLED | OUTPATIENT
Start: 2025-06-02

## 2025-06-02 RX ORDER — DIPHENHYDRAMINE HCL 25 MG
50 CAPSULE ORAL ONCE
Status: CANCELLED | OUTPATIENT
Start: 2025-06-02

## 2025-06-02 RX ORDER — PROCHLORPERAZINE EDISYLATE 5 MG/ML
10 INJECTION INTRAMUSCULAR; INTRAVENOUS EVERY 6 HOURS PRN
Status: DISCONTINUED | OUTPATIENT
Start: 2025-06-02 | End: 2025-06-02 | Stop reason: HOSPADM

## 2025-06-02 RX ORDER — PROCHLORPERAZINE EDISYLATE 5 MG/ML
10 INJECTION INTRAMUSCULAR; INTRAVENOUS EVERY 6 HOURS PRN
Status: CANCELLED | OUTPATIENT
Start: 2025-06-02

## 2025-06-02 RX ORDER — DIPHENHYDRAMINE HCL 25 MG
50 CAPSULE ORAL ONCE
Status: COMPLETED | OUTPATIENT
Start: 2025-06-02 | End: 2025-06-02

## 2025-06-02 RX ORDER — ALBUTEROL SULFATE 0.83 MG/ML
3 SOLUTION RESPIRATORY (INHALATION) AS NEEDED
Status: CANCELLED | OUTPATIENT
Start: 2025-06-02

## 2025-06-02 RX ORDER — PROCHLORPERAZINE MALEATE 10 MG
10 TABLET ORAL EVERY 6 HOURS PRN
Status: CANCELLED | OUTPATIENT
Start: 2025-06-02

## 2025-06-02 RX ORDER — PROCHLORPERAZINE MALEATE 10 MG
10 TABLET ORAL EVERY 6 HOURS PRN
Status: DISCONTINUED | OUTPATIENT
Start: 2025-06-02 | End: 2025-06-02 | Stop reason: HOSPADM

## 2025-06-02 RX ORDER — ALBUTEROL SULFATE 0.83 MG/ML
3 SOLUTION RESPIRATORY (INHALATION) AS NEEDED
Status: DISCONTINUED | OUTPATIENT
Start: 2025-06-02 | End: 2025-06-02 | Stop reason: HOSPADM

## 2025-06-02 RX ORDER — DIPHENHYDRAMINE HYDROCHLORIDE 50 MG/ML
50 INJECTION, SOLUTION INTRAMUSCULAR; INTRAVENOUS AS NEEDED
Status: DISCONTINUED | OUTPATIENT
Start: 2025-06-02 | End: 2025-06-02 | Stop reason: HOSPADM

## 2025-06-02 RX ORDER — FAMOTIDINE 10 MG/ML
20 INJECTION, SOLUTION INTRAVENOUS ONCE AS NEEDED
Status: DISCONTINUED | OUTPATIENT
Start: 2025-06-02 | End: 2025-06-02 | Stop reason: HOSPADM

## 2025-06-02 RX ORDER — PALONOSETRON 0.05 MG/ML
0.25 INJECTION, SOLUTION INTRAVENOUS ONCE
Status: CANCELLED | OUTPATIENT
Start: 2025-06-02

## 2025-06-02 RX ORDER — EPINEPHRINE 0.3 MG/.3ML
0.3 INJECTION SUBCUTANEOUS EVERY 5 MIN PRN
Status: DISCONTINUED | OUTPATIENT
Start: 2025-06-02 | End: 2025-06-02 | Stop reason: HOSPADM

## 2025-06-02 RX ORDER — PALONOSETRON 0.05 MG/ML
0.25 INJECTION, SOLUTION INTRAVENOUS ONCE
Status: COMPLETED | OUTPATIENT
Start: 2025-06-02 | End: 2025-06-02

## 2025-06-02 RX ORDER — FAMOTIDINE 10 MG/ML
20 INJECTION, SOLUTION INTRAVENOUS ONCE AS NEEDED
Status: CANCELLED | OUTPATIENT
Start: 2025-06-02

## 2025-06-02 RX ORDER — DIPHENHYDRAMINE HYDROCHLORIDE 50 MG/ML
50 INJECTION, SOLUTION INTRAMUSCULAR; INTRAVENOUS AS NEEDED
Status: CANCELLED | OUTPATIENT
Start: 2025-06-02

## 2025-06-02 RX ORDER — FAMOTIDINE 10 MG/ML
20 INJECTION, SOLUTION INTRAVENOUS ONCE
Status: CANCELLED | OUTPATIENT
Start: 2025-06-02

## 2025-06-02 RX ADMIN — SODIUM CHLORIDE 200 MG: 9 INJECTION, SOLUTION INTRAVENOUS at 10:32

## 2025-06-02 RX ADMIN — PEGFILGRASTIM 6 MG: KIT SUBCUTANEOUS at 14:52

## 2025-06-02 RX ADMIN — DIPHENHYDRAMINE HYDROCHLORIDE 50 MG: 25 CAPSULE ORAL at 09:11

## 2025-06-02 RX ADMIN — PALONOSETRON HYDROCHLORIDE 0.25 MG: 0.25 INJECTION, SOLUTION INTRAVENOUS at 09:12

## 2025-06-02 RX ADMIN — DEXAMETHASONE SODIUM PHOSPHATE 12 MG: 10 INJECTION, SOLUTION INTRAMUSCULAR; INTRAVENOUS at 09:18

## 2025-06-02 RX ADMIN — PACLITAXEL 216 MG: 6 INJECTION INTRAVENOUS at 11:12

## 2025-06-02 RX ADMIN — CARBOPLATIN 430 MG: 10 INJECTION INTRAVENOUS at 14:22

## 2025-06-02 RX ADMIN — FAMOTIDINE 20 MG: 10 INJECTION INTRAVENOUS at 09:15

## 2025-06-02 RX ADMIN — FOSAPREPITANT 150 MG: 150 INJECTION, POWDER, LYOPHILIZED, FOR SOLUTION INTRAVENOUS at 09:46

## 2025-06-02 ASSESSMENT — PAIN SCALES - GENERAL: PAINLEVEL_OUTOF10: 0-NO PAIN

## 2025-06-02 NOTE — PROGRESS NOTES
Patient ID: Mica Vanessa is a 69 y.o. female.  Referring Physician: No referring provider defined for this encounter.  Primary Care Provider: Joi Valle, DO    Subjective    CT with response    HPI  Interval History:  Reports fatigue, neuropathy in her feet and treating with Rx, also constipation, managing with Senna. Denies neuropathy in her hands. Otherwise she is planning to establish care with urology for her UI after cancer directed therapies have concluded secondary to worsening Sx while on treatment. Additionally she asekd about preventative treatment for atherosclerosis.    Objective    BSA: 1.64 meters squared  /83 (BP Location: Left arm, Patient Position: Sitting, BP Cuff Size: Adult)   Pulse 94   Temp 36.5 °C (97.7 °F) (Temporal)   Resp 16   Wt 62.3 kg (137 lb 5.6 oz)   SpO2 98%   BMI 26.07 kg/m²      Physical Exam  Constitutional:       General: She is not in acute distress.     Appearance: Normal appearance.   Cardiovascular:      Rate and Rhythm: Normal rate and regular rhythm.   Pulmonary:      Effort: Pulmonary effort is normal.      Breath sounds: Normal breath sounds.   Abdominal:      General: Bowel sounds are normal. There is no distension.   Musculoskeletal:      Right forearm: Normal.      Left forearm: Normal.      Right hand: Normal.      Left hand: Normal.      Right lower leg: Normal.      Left lower leg: Normal.      Right foot: Normal.      Left foot: Normal.         Performance Status:  Symptomatic; fully ambulatory    Assessment/Plan     Oncology History Overview Note   Cancer history:  10/6/23 EMB w/ uterine cancer, mesonephric type  10/21/23 Total laparoscopic hysterectomy bilateral salpingo oophorectomy, Bilateral sentinel lymph node injection and mapping, Left pelvic sentinel lymph node dissection and right complete pelvic lymphadenectomy, Cystourethroscopy.  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 MMRp, P53 alison type.  TB recommendations for radiation oncology consult, genetics referral, consider systemic therapy  Adjuvant pelvic RT  7/2024 - new pulmonary nodules, all less than 1cm  10/2024 CT chest stable  3/2025 biopsy of lung nodule - c/w metastatic uterine cancer  3/2025 carbo/taxol/pembro     Malignant neoplasm of endometrium (Multi)   11/10/2023 Initial Diagnosis    Malignant neoplasm of endometrium (Multi)     3/31/2025 -  Chemotherapy    Pembrolizumab + PACLitaxel / CARBOplatin, 21 Day Cycles followed by Pembrolizumab, 42 Day Cycles - Gyn           Problem List Items Addressed This Visit           ICD-10-CM    Malignant neoplasm of endometrium (Multi) C54.1    Relevant Orders    Clinic Appointment Request    Chemotherapy management, encounter for - Primary Z51.11       Treatment Plans       Name Type Plan Dates Plan Provider         Active    Pembrolizumab + PACLitaxel / CARBOplatin, 21 Day Cycles followed by Pembrolizumab, 42 Day Cycles - Gyn  Oncology Treatment 3/28/2025 - Present Nannette Palacio MD                  - Reviewed CT with the patient which showed decrease in burden of disease. Discussed continuing treatment plan.  - Patient here today for assessment and chemotherapy treatment.  - She will follow chemotherapy calendar.      Scribe Attestation  By signing my name below, IDyana Scribe   attest that this documentation has been prepared under the direction and in the presence of Nannette Palacio MD.

## 2025-06-23 ENCOUNTER — LAB (OUTPATIENT)
Dept: LAB | Facility: CLINIC | Age: 70
End: 2025-06-23
Payer: MEDICARE

## 2025-06-23 ENCOUNTER — OFFICE VISIT (OUTPATIENT)
Dept: GYNECOLOGIC ONCOLOGY | Facility: CLINIC | Age: 70
End: 2025-06-23
Payer: MEDICARE

## 2025-06-23 ENCOUNTER — INFUSION (OUTPATIENT)
Dept: HEMATOLOGY/ONCOLOGY | Facility: CLINIC | Age: 70
End: 2025-06-23
Payer: MEDICARE

## 2025-06-23 VITALS
HEART RATE: 91 BPM | BODY MASS INDEX: 26.27 KG/M2 | TEMPERATURE: 97.5 F | SYSTOLIC BLOOD PRESSURE: 130 MMHG | RESPIRATION RATE: 16 BRPM | OXYGEN SATURATION: 94 % | WEIGHT: 138.45 LBS | DIASTOLIC BLOOD PRESSURE: 77 MMHG

## 2025-06-23 DIAGNOSIS — C54.1 MALIGNANT NEOPLASM OF ENDOMETRIUM (MULTI): ICD-10-CM

## 2025-06-23 DIAGNOSIS — Z51.11 CHEMOTHERAPY MANAGEMENT, ENCOUNTER FOR: Primary | ICD-10-CM

## 2025-06-23 LAB
ALBUMIN SERPL BCP-MCNC: 4.5 G/DL (ref 3.4–5)
ALP SERPL-CCNC: 86 U/L (ref 33–136)
ALT SERPL W P-5'-P-CCNC: 11 U/L (ref 7–45)
ANION GAP SERPL CALC-SCNC: 17 MMOL/L (ref 10–20)
AST SERPL W P-5'-P-CCNC: 12 U/L (ref 9–39)
BASOPHILS # BLD AUTO: 0 X10*3/UL (ref 0–0.1)
BASOPHILS NFR BLD AUTO: 0 %
BILIRUB SERPL-MCNC: 0.4 MG/DL (ref 0–1.2)
BUN SERPL-MCNC: 23 MG/DL (ref 6–23)
CALCIUM SERPL-MCNC: 9.6 MG/DL (ref 8.6–10.3)
CANCER AG125 SERPL-ACNC: 21.1 U/ML (ref 0–30.2)
CHLORIDE SERPL-SCNC: 102 MMOL/L (ref 98–107)
CO2 SERPL-SCNC: 23 MMOL/L (ref 21–32)
CORTIS AM PEAK SERPL-MSCNC: 2.7 UG/DL (ref 5–20)
CREAT SERPL-MCNC: 0.59 MG/DL (ref 0.5–1.05)
EGFRCR SERPLBLD CKD-EPI 2021: >90 ML/MIN/1.73M*2
EOSINOPHIL # BLD AUTO: 0 X10*3/UL (ref 0–0.7)
EOSINOPHIL NFR BLD AUTO: 0 %
ERYTHROCYTE [DISTWIDTH] IN BLOOD BY AUTOMATED COUNT: 14 % (ref 11.5–14.5)
GLUCOSE SERPL-MCNC: 219 MG/DL (ref 74–99)
HCT VFR BLD AUTO: 36.9 % (ref 36–46)
HGB BLD-MCNC: 12.3 G/DL (ref 12–16)
IMM GRANULOCYTES # BLD AUTO: 0.02 X10*3/UL (ref 0–0.7)
IMM GRANULOCYTES NFR BLD AUTO: 0.5 % (ref 0–0.9)
LYMPHOCYTES # BLD AUTO: 0.29 X10*3/UL (ref 1.2–4.8)
LYMPHOCYTES NFR BLD AUTO: 7.3 %
MAGNESIUM SERPL-MCNC: 1.5 MG/DL (ref 1.6–2.4)
MCH RBC QN AUTO: 31.9 PG (ref 26–34)
MCHC RBC AUTO-ENTMCNC: 33.3 G/DL (ref 32–36)
MCV RBC AUTO: 96 FL (ref 80–100)
MONOCYTES # BLD AUTO: 0.01 X10*3/UL (ref 0.1–1)
MONOCYTES NFR BLD AUTO: 0.3 %
NEUTROPHILS # BLD AUTO: 3.66 X10*3/UL (ref 1.2–7.7)
NEUTROPHILS NFR BLD AUTO: 91.9 %
PLATELET # BLD AUTO: 235 X10*3/UL (ref 150–450)
POTASSIUM SERPL-SCNC: 4.2 MMOL/L (ref 3.5–5.3)
PROT SERPL-MCNC: 6.8 G/DL (ref 6.4–8.2)
RBC # BLD AUTO: 3.85 X10*6/UL (ref 4–5.2)
SODIUM SERPL-SCNC: 138 MMOL/L (ref 136–145)
TSH SERPL-ACNC: 0.86 MIU/L (ref 0.44–3.98)
WBC # BLD AUTO: 4 X10*3/UL (ref 4.4–11.3)

## 2025-06-23 PROCEDURE — 96377 APPLICATON ON-BODY INJECTOR: CPT | Mod: 59

## 2025-06-23 PROCEDURE — G2211 COMPLEX E/M VISIT ADD ON: HCPCS | Performed by: OBSTETRICS & GYNECOLOGY

## 2025-06-23 PROCEDURE — 2500000004 HC RX 250 GENERAL PHARMACY W/ HCPCS (ALT 636 FOR OP/ED): Performed by: OBSTETRICS & GYNECOLOGY

## 2025-06-23 PROCEDURE — 36415 COLL VENOUS BLD VENIPUNCTURE: CPT

## 2025-06-23 PROCEDURE — 2500000001 HC RX 250 WO HCPCS SELF ADMINISTERED DRUGS (ALT 637 FOR MEDICARE OP): Performed by: OBSTETRICS & GYNECOLOGY

## 2025-06-23 PROCEDURE — 82533 TOTAL CORTISOL: CPT

## 2025-06-23 PROCEDURE — 96413 CHEMO IV INFUSION 1 HR: CPT

## 2025-06-23 PROCEDURE — 83735 ASSAY OF MAGNESIUM: CPT

## 2025-06-23 PROCEDURE — 3075F SYST BP GE 130 - 139MM HG: CPT | Performed by: OBSTETRICS & GYNECOLOGY

## 2025-06-23 PROCEDURE — 1159F MED LIST DOCD IN RCRD: CPT | Performed by: OBSTETRICS & GYNECOLOGY

## 2025-06-23 PROCEDURE — 96367 TX/PROPH/DG ADDL SEQ IV INF: CPT

## 2025-06-23 PROCEDURE — 82024 ASSAY OF ACTH: CPT

## 2025-06-23 PROCEDURE — 84443 ASSAY THYROID STIM HORMONE: CPT

## 2025-06-23 PROCEDURE — 99215 OFFICE O/P EST HI 40 MIN: CPT | Mod: 25 | Performed by: OBSTETRICS & GYNECOLOGY

## 2025-06-23 PROCEDURE — 1160F RVW MEDS BY RX/DR IN RCRD: CPT | Performed by: OBSTETRICS & GYNECOLOGY

## 2025-06-23 PROCEDURE — 86304 IMMUNOASSAY TUMOR CA 125: CPT

## 2025-06-23 PROCEDURE — 80053 COMPREHEN METABOLIC PANEL: CPT

## 2025-06-23 PROCEDURE — 96417 CHEMO IV INFUS EACH ADDL SEQ: CPT

## 2025-06-23 PROCEDURE — 96415 CHEMO IV INFUSION ADDL HR: CPT

## 2025-06-23 PROCEDURE — 1036F TOBACCO NON-USER: CPT | Performed by: OBSTETRICS & GYNECOLOGY

## 2025-06-23 PROCEDURE — 3078F DIAST BP <80 MM HG: CPT | Performed by: OBSTETRICS & GYNECOLOGY

## 2025-06-23 PROCEDURE — 1126F AMNT PAIN NOTED NONE PRSNT: CPT | Performed by: OBSTETRICS & GYNECOLOGY

## 2025-06-23 PROCEDURE — 99215 OFFICE O/P EST HI 40 MIN: CPT | Performed by: OBSTETRICS & GYNECOLOGY

## 2025-06-23 PROCEDURE — 85025 COMPLETE CBC W/AUTO DIFF WBC: CPT

## 2025-06-23 PROCEDURE — 96375 TX/PRO/DX INJ NEW DRUG ADDON: CPT | Mod: INF

## 2025-06-23 RX ORDER — DIPHENHYDRAMINE HCL 25 MG
50 CAPSULE ORAL ONCE
Status: COMPLETED | OUTPATIENT
Start: 2025-06-23 | End: 2025-06-23

## 2025-06-23 RX ORDER — PROCHLORPERAZINE EDISYLATE 5 MG/ML
10 INJECTION INTRAMUSCULAR; INTRAVENOUS EVERY 6 HOURS PRN
Status: CANCELLED | OUTPATIENT
Start: 2025-06-23

## 2025-06-23 RX ORDER — EPINEPHRINE 0.3 MG/.3ML
0.3 INJECTION SUBCUTANEOUS EVERY 5 MIN PRN
Status: CANCELLED | OUTPATIENT
Start: 2025-06-23

## 2025-06-23 RX ORDER — PROCHLORPERAZINE EDISYLATE 5 MG/ML
10 INJECTION INTRAMUSCULAR; INTRAVENOUS EVERY 6 HOURS PRN
Status: DISCONTINUED | OUTPATIENT
Start: 2025-06-23 | End: 2025-06-23 | Stop reason: HOSPADM

## 2025-06-23 RX ORDER — ALBUTEROL SULFATE 0.83 MG/ML
3 SOLUTION RESPIRATORY (INHALATION) AS NEEDED
Status: CANCELLED | OUTPATIENT
Start: 2025-06-23

## 2025-06-23 RX ORDER — DIPHENHYDRAMINE HYDROCHLORIDE 50 MG/ML
50 INJECTION, SOLUTION INTRAMUSCULAR; INTRAVENOUS AS NEEDED
Status: CANCELLED | OUTPATIENT
Start: 2025-06-23

## 2025-06-23 RX ORDER — EPINEPHRINE 0.3 MG/.3ML
0.3 INJECTION SUBCUTANEOUS EVERY 5 MIN PRN
Status: DISCONTINUED | OUTPATIENT
Start: 2025-06-23 | End: 2025-06-23 | Stop reason: HOSPADM

## 2025-06-23 RX ORDER — ALBUTEROL SULFATE 0.83 MG/ML
3 SOLUTION RESPIRATORY (INHALATION) AS NEEDED
Status: DISCONTINUED | OUTPATIENT
Start: 2025-06-23 | End: 2025-06-23 | Stop reason: HOSPADM

## 2025-06-23 RX ORDER — FAMOTIDINE 10 MG/ML
20 INJECTION, SOLUTION INTRAVENOUS ONCE AS NEEDED
Status: DISCONTINUED | OUTPATIENT
Start: 2025-06-23 | End: 2025-06-23 | Stop reason: HOSPADM

## 2025-06-23 RX ORDER — FAMOTIDINE 10 MG/ML
20 INJECTION, SOLUTION INTRAVENOUS ONCE
Status: CANCELLED | OUTPATIENT
Start: 2025-06-23

## 2025-06-23 RX ORDER — PALONOSETRON 0.05 MG/ML
0.25 INJECTION, SOLUTION INTRAVENOUS ONCE
Status: CANCELLED | OUTPATIENT
Start: 2025-06-23

## 2025-06-23 RX ORDER — DIPHENHYDRAMINE HYDROCHLORIDE 50 MG/ML
50 INJECTION, SOLUTION INTRAMUSCULAR; INTRAVENOUS AS NEEDED
Status: DISCONTINUED | OUTPATIENT
Start: 2025-06-23 | End: 2025-06-23 | Stop reason: HOSPADM

## 2025-06-23 RX ORDER — FAMOTIDINE 10 MG/ML
20 INJECTION, SOLUTION INTRAVENOUS ONCE
Status: COMPLETED | OUTPATIENT
Start: 2025-06-23 | End: 2025-06-23

## 2025-06-23 RX ORDER — DIPHENHYDRAMINE HCL 25 MG
50 CAPSULE ORAL ONCE
Status: CANCELLED | OUTPATIENT
Start: 2025-06-23

## 2025-06-23 RX ORDER — PALONOSETRON 0.05 MG/ML
0.25 INJECTION, SOLUTION INTRAVENOUS ONCE
Status: COMPLETED | OUTPATIENT
Start: 2025-06-23 | End: 2025-06-23

## 2025-06-23 RX ORDER — PROCHLORPERAZINE MALEATE 10 MG
10 TABLET ORAL EVERY 6 HOURS PRN
Status: DISCONTINUED | OUTPATIENT
Start: 2025-06-23 | End: 2025-06-23 | Stop reason: HOSPADM

## 2025-06-23 RX ORDER — FAMOTIDINE 10 MG/ML
20 INJECTION, SOLUTION INTRAVENOUS ONCE AS NEEDED
Status: CANCELLED | OUTPATIENT
Start: 2025-06-23

## 2025-06-23 RX ORDER — PROCHLORPERAZINE MALEATE 10 MG
10 TABLET ORAL EVERY 6 HOURS PRN
Status: CANCELLED | OUTPATIENT
Start: 2025-06-23

## 2025-06-23 RX ADMIN — PALONOSETRON 0.25 MG: 0.05 INJECTION, SOLUTION INTRAVENOUS at 09:07

## 2025-06-23 RX ADMIN — PACLITAXEL 216 MG: 6 INJECTION INTRAVENOUS at 11:04

## 2025-06-23 RX ADMIN — DEXAMETHASONE SODIUM PHOSPHATE 12 MG: 10 INJECTION, SOLUTION INTRAMUSCULAR; INTRAVENOUS at 09:32

## 2025-06-23 RX ADMIN — DIPHENHYDRAMINE HYDROCHLORIDE 50 MG: 25 CAPSULE ORAL at 09:06

## 2025-06-23 RX ADMIN — FOSAPREPITANT 150 MG: 150 INJECTION, POWDER, LYOPHILIZED, FOR SOLUTION INTRAVENOUS at 09:49

## 2025-06-23 RX ADMIN — PEGFILGRASTIM 6 MG: KIT SUBCUTANEOUS at 14:30

## 2025-06-23 RX ADMIN — SODIUM CHLORIDE 200 MG: 9 INJECTION, SOLUTION INTRAVENOUS at 10:32

## 2025-06-23 RX ADMIN — FAMOTIDINE 20 MG: 10 INJECTION INTRAVENOUS at 09:10

## 2025-06-23 RX ADMIN — CARBOPLATIN 430 MG: 10 INJECTION INTRAVENOUS at 14:08

## 2025-06-23 ASSESSMENT — PAIN SCALES - GENERAL: PAINLEVEL_OUTOF10: 0-NO PAIN

## 2025-06-23 NOTE — PROGRESS NOTES
Patient ID: Mica Vanessa is a 69 y.o. female.  Referring Physician: No referring provider defined for this encounter.  Primary Care Provider: Joi Valle,     Subjective    Overall feeling well  Grade 1 neuropathy in the feet not affecting ADLs  Eating well    No nausea    Has some fatigue    Some urinary incontinence she feels is worse after chemo -would not like to pursue workup and management of this until after treatment is done          Objective    BSA: 1.64 meters squared  /77 (BP Location: Right arm, Patient Position: Sitting, BP Cuff Size: Adult)   Pulse 91   Temp 36.4 °C (97.5 °F) (Temporal)   Resp 16   Wt 62.8 kg (138 lb 7.2 oz)   SpO2 94%   BMI 26.27 kg/m²      Physical Exam  Vitals and nursing note reviewed.   Constitutional:       Appearance: Normal appearance.   HENT:      Head: Normocephalic.   Eyes:      Pupils: Pupils are equal, round, and reactive to light.   Cardiovascular:      Rate and Rhythm: Normal rate and regular rhythm.   Pulmonary:      Effort: Pulmonary effort is normal.      Breath sounds: Normal breath sounds.   Abdominal:      General: Abdomen is flat.      Palpations: Abdomen is soft.      Tenderness: There is no abdominal tenderness.   Musculoskeletal:         General: Normal range of motion.      Cervical back: Normal range of motion and neck supple.   Skin:     General: Skin is warm and dry.   Neurological:      Mental Status: She is alert and oriented to person, place, and time.   Psychiatric:         Mood and Affect: Mood normal.         Behavior: Behavior normal.         Performance Status:  Symptomatic; fully ambulatory    Assessment/Plan     Oncology History Overview Note   Cancer history:  10/6/23 EMB w/ uterine cancer, mesonephric type  10/21/23 Total laparoscopic hysterectomy bilateral salpingo oophorectomy, Bilateral sentinel lymph node injection and mapping, Left pelvic sentinel lymph node dissection and right complete pelvic lymphadenectomy,  Cystourethroscopy.  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 MMRp, P53 alison type. TB recommendations for radiation oncology consult, genetics referral, consider systemic therapy  Adjuvant pelvic RT  7/2024 - new pulmonary nodules, all less than 1cm  10/2024 CT chest stable  3/2025 biopsy of lung nodule - c/w metastatic uterine cancer  3/2025 carbo/taxol/pembro     Malignant neoplasm of endometrium (Multi)   11/10/2023 Initial Diagnosis    Malignant neoplasm of endometrium (Multi)     3/31/2025 -  Chemotherapy    Pembrolizumab + PACLitaxel / CARBOplatin, 21 Day Cycles followed by Pembrolizumab, 42 Day Cycles - Gyn           Problem List Items Addressed This Visit           ICD-10-CM    Malignant neoplasm of endometrium (Multi) C54.1    Chemotherapy management, encounter for - Primary Z51.11       Treatment Plans       Name Type Plan Dates Plan Provider         Active    Pembrolizumab + PACLitaxel / CARBOplatin, 21 Day Cycles followed by Pembrolizumab, 42 Day Cycles - Gyn  Oncology Treatment 3/28/2025 - Present Nannette Palacio MD                  Pretreatment labs pending - for cycle #5 today pending results.  CT after cycle 6, then plan for maintenance pembrolizumab

## 2025-06-24 DIAGNOSIS — C54.1 MALIGNANT NEOPLASM OF ENDOMETRIUM (MULTI): ICD-10-CM

## 2025-06-25 LAB — ACTH PLAS-MCNC: <1.5 PG/ML (ref 7.2–63.3)

## 2025-06-27 ENCOUNTER — TELEPHONE (OUTPATIENT)
Dept: GYNECOLOGIC ONCOLOGY | Facility: HOSPITAL | Age: 70
End: 2025-06-27
Payer: MEDICARE

## 2025-06-27 DIAGNOSIS — J02.9 ACUTE PHARYNGITIS, UNSPECIFIED ETIOLOGY: Primary | ICD-10-CM

## 2025-06-27 RX ORDER — AZITHROMYCIN 500 MG/1
500 TABLET, FILM COATED ORAL DAILY
Qty: 3 TABLET | Refills: 0 | Status: SHIPPED | OUTPATIENT
Start: 2025-06-27 | End: 2025-06-30

## 2025-06-27 NOTE — TELEPHONE ENCOUNTER
Update: Dr. Palacio called in a Z-pack for patient, call made to patient and she is aware.     Update: Spoke with patient who states that her PCP does not have any availability until Wed 7/2. She is afebrile, is experiencing a great deal of sinus pain, cannot hear out of her left ear. Has copious amounts of yellow/white sinus drainage and has an occasional loose, productive cough. She is not SOB. She states that she prefers not to go to an urgent care as she had her chemo infusion 4 days ago and is fatigued, has joint pain etc. I will send a message to Dr. Palacio and call her back with an update ASAP.     Patient left message that for the last 3 days she has been experiencing sinus drainage that has been increasingly worse and has now affected an ear (states that her ear is plugged up). I called her back and got her VM and left her a detailed message urging her to call her PCP or visit a local urgent care. Asked her to call back if she has a high fever or any concerns re: this.

## 2025-06-30 ENCOUNTER — OFFICE VISIT (OUTPATIENT)
Dept: PRIMARY CARE | Facility: CLINIC | Age: 70
End: 2025-06-30
Payer: MEDICARE

## 2025-06-30 VITALS
BODY MASS INDEX: 25.79 KG/M2 | OXYGEN SATURATION: 93 % | SYSTOLIC BLOOD PRESSURE: 105 MMHG | DIASTOLIC BLOOD PRESSURE: 66 MMHG | TEMPERATURE: 98.1 F | HEIGHT: 61 IN | WEIGHT: 136.6 LBS | RESPIRATION RATE: 16 BRPM | HEART RATE: 103 BPM

## 2025-06-30 DIAGNOSIS — C54.1 MALIGNANT NEOPLASM OF ENDOMETRIUM (MULTI): ICD-10-CM

## 2025-06-30 DIAGNOSIS — J01.90 ACUTE NON-RECURRENT SINUSITIS, UNSPECIFIED LOCATION: Primary | ICD-10-CM

## 2025-06-30 DIAGNOSIS — K21.00 GASTROESOPHAGEAL REFLUX DISEASE WITH ESOPHAGITIS, UNSPECIFIED WHETHER HEMORRHAGE: ICD-10-CM

## 2025-06-30 DIAGNOSIS — I10 HTN (HYPERTENSION), BENIGN: ICD-10-CM

## 2025-06-30 DIAGNOSIS — F41.9 ANXIETY: ICD-10-CM

## 2025-06-30 DIAGNOSIS — I10 PRIMARY HYPERTENSION: ICD-10-CM

## 2025-06-30 DIAGNOSIS — H66.002 NON-RECURRENT ACUTE SUPPURATIVE OTITIS MEDIA OF LEFT EAR WITHOUT SPONTANEOUS RUPTURE OF TYMPANIC MEMBRANE: ICD-10-CM

## 2025-06-30 PROCEDURE — 3008F BODY MASS INDEX DOCD: CPT

## 2025-06-30 PROCEDURE — 1159F MED LIST DOCD IN RCRD: CPT

## 2025-06-30 PROCEDURE — 1036F TOBACCO NON-USER: CPT

## 2025-06-30 PROCEDURE — 3074F SYST BP LT 130 MM HG: CPT

## 2025-06-30 PROCEDURE — 3078F DIAST BP <80 MM HG: CPT

## 2025-06-30 PROCEDURE — 99213 OFFICE O/P EST LOW 20 MIN: CPT

## 2025-06-30 RX ORDER — LISINOPRIL 20 MG/1
20 TABLET ORAL
COMMUNITY
Start: 2025-05-22

## 2025-06-30 RX ORDER — AMOXICILLIN AND CLAVULANATE POTASSIUM 875; 125 MG/1; MG/1
875 TABLET, FILM COATED ORAL 2 TIMES DAILY
Qty: 14 TABLET | Refills: 0 | Status: SHIPPED | OUTPATIENT
Start: 2025-06-30 | End: 2025-07-07

## 2025-06-30 RX ORDER — LORATADINE 10 MG
10 TABLET,DISINTEGRATING ORAL DAILY
COMMUNITY

## 2025-06-30 RX ORDER — PROPRANOLOL HYDROCHLORIDE 10 MG/1
TABLET ORAL
COMMUNITY
Start: 2025-05-20

## 2025-06-30 ASSESSMENT — ENCOUNTER SYMPTOMS
COUGH: 1
RHINORRHEA: 1

## 2025-06-30 NOTE — ASSESSMENT & PLAN NOTE
History of GERD complicated by Khan's esophagus diagnosed in 2020 via EGD.  She does follow with GI currently symptoms are well-controlled with, Nexium.  Orders:    Follow Up In [facility] - PCP - Established; Future

## 2025-06-30 NOTE — ASSESSMENT & PLAN NOTE
History of controlled hypertension on lisinopril, currently taking propranolol twice daily prescribed for anxiety.  She does endorse an element of whitecoat hypertension.  We can trial discontinuing propranolol and monitoring for changes in blood pressure with follow-up in 3 months.  Orders:    Follow Up [facility] - PCP - Established; Future

## 2025-06-30 NOTE — PROGRESS NOTES
"Subjective     Mica Vanessa is a 69 y.o. female who presents for Ear Fullness, Sinus Problem (Pressure on left side of face with runny nose. Pt states she took her Z pack but no changes), and Cough (Cough with yellow phlegm).    Earache   There is pain in the left ear. This is a new problem. The current episode started in the past 7 days. The problem occurs constantly. The problem has been waxing and waning. There has been no fever. The pain is at a severity of 2/10. Associated symptoms include coughing, hearing loss and rhinorrhea.      Patient is presenting with left ear pain, left sinus congestion, yellow nasal discharge, mild cough that occurred within the last 7 days.  She states that she called the office last week and was requesting an antibiotic but not cannot get an appointment until this week.  She did reach out to her oncologist who sent in a 3-day azithromycin course for which the patient took but was concerned that it was not longer left to treat her symptoms.  She continues to have postnasal drip, rhinorrhea, left ear pain, muffled hearing/hearing loss and yellow phlegm.  Patient states that last year she had pneumonia but is otherwise not had any recent sinus infections.  She is in the middle of chemo cycle and just finished cycle 5 several days ago with her next cycle starting in 2 weeks.  She is overall just felt unwell in comparison to at this point after her other cycles.  She has been using Flonase, ipratropium, Sudafed without much help.     Review of Systems   HENT:  Positive for ear pain, hearing loss and rhinorrhea.    Respiratory:  Positive for cough.        Objective     Vitals:    06/30/25 1527   BP: 105/66   BP Location: Right arm   Patient Position: Sitting   BP Cuff Size: Adult   Pulse: 103   Resp: 16   Temp: 36.7 °C (98.1 °F)   TempSrc: Temporal   SpO2: 93%   Weight: 62 kg (136 lb 9.6 oz)   Height: (!) 1.546 m (5' 0.87\")        Physical Exam  Vitals and nursing note reviewed. "   Constitutional:       General: She is not in acute distress.     Appearance: Normal appearance. She is not ill-appearing.   HENT:      Head: Normocephalic and atraumatic.      Right Ear: Ear canal and external ear normal. Decreased hearing noted. A middle ear effusion is present. Tympanic membrane is bulging.      Left Ear: Tympanic membrane, ear canal and external ear normal.   Eyes:      Conjunctiva/sclera: Conjunctivae normal.   Cardiovascular:      Rate and Rhythm: Normal rate and regular rhythm.      Pulses: Normal pulses.   Pulmonary:      Effort: Pulmonary effort is normal. No respiratory distress.      Breath sounds: Normal breath sounds. No wheezing, rhonchi or rales.   Abdominal:      General: Abdomen is flat.      Palpations: Abdomen is soft.   Musculoskeletal:      Cervical back: Normal range of motion and neck supple.      Right lower leg: No edema.      Left lower leg: No edema.   Skin:     General: Skin is warm and dry.      Findings: No rash.   Neurological:      General: No focal deficit present.      Mental Status: She is alert. Mental status is at baseline.   Psychiatric:         Mood and Affect: Mood normal.         Thought Content: Thought content normal.         Assessment & Plan  Acute non-recurrent sinusitis, unspecified location  Patient has had 7 days of left sinus pressure, yellow phlegm discharge, left ear pain, mild hearing loss in the left ear, fatigue, postnasal drip with mildly productive cough and failed treatment with 3 days of azithromycin.  Physical exam findings with left TM bulging, purulent effusion behind TM otherwise intact concerning for acute otitis media.  Left maxillary and frontal sinuses tender to palpation and hurst when compared to the right.  Purulent nasal discharge noted on examination.  Throat nonerythematous no exudate.  Will plan to treat patient with full course of Augmentin antibiotics for 7 days.  Did discuss possible side effect of diarrhea encouraged  hydration and electrolyte replacement and patient agreeable to this plan.  Encouraged patient to return or reach out to the office if no improvement of symptoms or any new or worsening symptoms develop.  Non-recurrent acute suppurative otitis media of left ear without spontaneous rupture of tympanic membrane    HTN (hypertension), benign  History of controlled hypertension on lisinopril, currently taking propranolol twice daily prescribed for anxiety.  She does endorse an element of whitecoat hypertension.  We can trial discontinuing propranolol and monitoring for changes in blood pressure with follow-up in 3 months.  Orders:    Follow Up In [facility] - PCP - Established; Future    Gastroesophageal reflux disease with esophagitis, unspecified whether hemorrhage  History of GERD complicated by Khan's esophagus diagnosed in 2020 via EGD.  She does follow with GI currently symptoms are well-controlled with, Nexium.  Orders:    Follow Up [facility] - PCP - Established; Future    Primary hypertension  Stable, well controlled.  Orders:    Follow Up [facility] - PCP - Established; Future         The patient will follow up in 2-3 months with PCP Dr. Valle, sooner if needed for any new or worsening concerns.       The patient was discussed with attending physician, Dr. Chavira.       Linsey Sierra DO  Family Medicine Resident, PGY-2  [facility]  [address] [place] [address] [phone]     This note has been transcribed using Dragon voice recognition system and there is a possibility of unintentional typing misprints.  Any information found to be copied from previous providers is done in the best interest of the patient to provide accurate, quality, and continuity of care.

## 2025-06-30 NOTE — ASSESSMENT & PLAN NOTE
OK   Stable, well controlled.  Orders:    Follow Up In [facility] - PCP - Established; Future

## 2025-07-01 NOTE — PROGRESS NOTES
I reviewed the resident/fellow's documentation and discussed the patient with the resident. I agree with the resident medical decision making as documented in the note.   Patient has sinusitis/ear symptoms.  No fever no chills no chest pain shortness of breath.  Feels more muffled in her ears.  We will treat her for otitis media, sinusitis.  Will see if this helps.  Patient aware if any increase symptoms any pain any fever any chills any worsening symptoms notify the office or go to ER  Follow-up in 2 weeks if needed  Agree with assessment and plan      Maverick Chavira, DO

## 2025-07-06 ENCOUNTER — APPOINTMENT (OUTPATIENT)
Dept: RADIOLOGY | Facility: HOSPITAL | Age: 70
DRG: 153 | End: 2025-07-06
Payer: MEDICARE

## 2025-07-06 ENCOUNTER — HOSPITAL ENCOUNTER (EMERGENCY)
Facility: HOSPITAL | Age: 70
Discharge: HOME | DRG: 153 | End: 2025-07-06
Attending: STUDENT IN AN ORGANIZED HEALTH CARE EDUCATION/TRAINING PROGRAM
Payer: MEDICARE

## 2025-07-06 VITALS
TEMPERATURE: 98.6 F | SYSTOLIC BLOOD PRESSURE: 144 MMHG | HEART RATE: 88 BPM | BODY MASS INDEX: 25.03 KG/M2 | RESPIRATION RATE: 16 BRPM | DIASTOLIC BLOOD PRESSURE: 61 MMHG | HEIGHT: 62 IN | OXYGEN SATURATION: 96 % | WEIGHT: 136 LBS

## 2025-07-06 DIAGNOSIS — H66.006 RECURRENT ACUTE SUPPURATIVE OTITIS MEDIA WITHOUT SPONTANEOUS RUPTURE OF TYMPANIC MEMBRANE OF BOTH SIDES: Primary | ICD-10-CM

## 2025-07-06 LAB
ALBUMIN SERPL BCP-MCNC: 4.2 G/DL (ref 3.4–5)
ALP SERPL-CCNC: 77 U/L (ref 33–136)
ALT SERPL W P-5'-P-CCNC: 20 U/L (ref 7–45)
ANION GAP SERPL CALC-SCNC: 12 MMOL/L (ref 10–20)
APPEARANCE UR: CLEAR
APTT PPP: 27 SECONDS (ref 26–36)
AST SERPL W P-5'-P-CCNC: 15 U/L (ref 9–39)
BASOPHILS # BLD AUTO: 0.04 X10*3/UL (ref 0–0.1)
BASOPHILS NFR BLD AUTO: 0.5 %
BILIRUB SERPL-MCNC: 0.6 MG/DL (ref 0–1.2)
BILIRUB UR STRIP.AUTO-MCNC: NEGATIVE MG/DL
BUN SERPL-MCNC: 14 MG/DL (ref 6–23)
CALCIUM SERPL-MCNC: 9.1 MG/DL (ref 8.6–10.3)
CHLORIDE SERPL-SCNC: 98 MMOL/L (ref 98–107)
CO2 SERPL-SCNC: 28 MMOL/L (ref 21–32)
COLOR UR: YELLOW
CREAT SERPL-MCNC: 0.52 MG/DL (ref 0.5–1.05)
EGFRCR SERPLBLD CKD-EPI 2021: >90 ML/MIN/1.73M*2
EOSINOPHIL # BLD AUTO: 0.01 X10*3/UL (ref 0–0.7)
EOSINOPHIL NFR BLD AUTO: 0.1 %
ERYTHROCYTE [DISTWIDTH] IN BLOOD BY AUTOMATED COUNT: 13.5 % (ref 11.5–14.5)
FLUAV RNA RESP QL NAA+PROBE: NOT DETECTED
FLUBV RNA RESP QL NAA+PROBE: NOT DETECTED
GLUCOSE SERPL-MCNC: 92 MG/DL (ref 74–99)
GLUCOSE UR STRIP.AUTO-MCNC: NORMAL MG/DL
HCT VFR BLD AUTO: 35.7 % (ref 36–46)
HGB BLD-MCNC: 12 G/DL (ref 12–16)
IMM GRANULOCYTES # BLD AUTO: 0.09 X10*3/UL (ref 0–0.7)
IMM GRANULOCYTES NFR BLD AUTO: 1.1 % (ref 0–0.9)
INR PPP: 1.1 (ref 0.9–1.1)
KETONES UR STRIP.AUTO-MCNC: ABNORMAL MG/DL
LACTATE SERPL-SCNC: 1 MMOL/L (ref 0.4–2)
LEUKOCYTE ESTERASE UR QL STRIP.AUTO: NEGATIVE
LYMPHOCYTES # BLD AUTO: 0.89 X10*3/UL (ref 1.2–4.8)
LYMPHOCYTES NFR BLD AUTO: 10.4 %
MCH RBC QN AUTO: 32.3 PG (ref 26–34)
MCHC RBC AUTO-ENTMCNC: 33.6 G/DL (ref 32–36)
MCV RBC AUTO: 96 FL (ref 80–100)
MONOCYTES # BLD AUTO: 0.69 X10*3/UL (ref 0.1–1)
MONOCYTES NFR BLD AUTO: 8.1 %
MUCOUS THREADS #/AREA URNS AUTO: ABNORMAL /LPF
NEUTROPHILS # BLD AUTO: 6.83 X10*3/UL (ref 1.2–7.7)
NEUTROPHILS NFR BLD AUTO: 79.8 %
NITRITE UR QL STRIP.AUTO: NEGATIVE
NRBC BLD-RTO: 0 /100 WBCS (ref 0–0)
PH UR STRIP.AUTO: 5.5 [PH]
PLATELET # BLD AUTO: 175 X10*3/UL (ref 150–450)
POTASSIUM SERPL-SCNC: 3.6 MMOL/L (ref 3.5–5.3)
PROT SERPL-MCNC: 6.6 G/DL (ref 6.4–8.2)
PROT UR STRIP.AUTO-MCNC: ABNORMAL MG/DL
PROTHROMBIN TIME: 12.1 SECONDS (ref 9.8–12.4)
RBC # BLD AUTO: 3.72 X10*6/UL (ref 4–5.2)
RBC # UR STRIP.AUTO: ABNORMAL MG/DL
RBC #/AREA URNS AUTO: ABNORMAL /HPF
SARS-COV-2 RNA RESP QL NAA+PROBE: NOT DETECTED
SODIUM SERPL-SCNC: 134 MMOL/L (ref 136–145)
SP GR UR STRIP.AUTO: 1.02
UROBILINOGEN UR STRIP.AUTO-MCNC: NORMAL MG/DL
WBC # BLD AUTO: 8.6 X10*3/UL (ref 4.4–11.3)
WBC #/AREA URNS AUTO: ABNORMAL /HPF

## 2025-07-06 PROCEDURE — 83605 ASSAY OF LACTIC ACID: CPT | Performed by: EMERGENCY MEDICINE

## 2025-07-06 PROCEDURE — 85610 PROTHROMBIN TIME: CPT | Performed by: EMERGENCY MEDICINE

## 2025-07-06 PROCEDURE — 2500000004 HC RX 250 GENERAL PHARMACY W/ HCPCS (ALT 636 FOR OP/ED): Performed by: EMERGENCY MEDICINE

## 2025-07-06 PROCEDURE — 99285 EMERGENCY DEPT VISIT HI MDM: CPT | Performed by: STUDENT IN AN ORGANIZED HEALTH CARE EDUCATION/TRAINING PROGRAM

## 2025-07-06 PROCEDURE — 71046 X-RAY EXAM CHEST 2 VIEWS: CPT | Mod: FOREIGN READ | Performed by: RADIOLOGY

## 2025-07-06 PROCEDURE — 85730 THROMBOPLASTIN TIME PARTIAL: CPT | Performed by: EMERGENCY MEDICINE

## 2025-07-06 PROCEDURE — 87636 SARSCOV2 & INF A&B AMP PRB: CPT | Performed by: EMERGENCY MEDICINE

## 2025-07-06 PROCEDURE — 81001 URINALYSIS AUTO W/SCOPE: CPT | Performed by: EMERGENCY MEDICINE

## 2025-07-06 PROCEDURE — 36415 COLL VENOUS BLD VENIPUNCTURE: CPT | Performed by: EMERGENCY MEDICINE

## 2025-07-06 PROCEDURE — 2500000001 HC RX 250 WO HCPCS SELF ADMINISTERED DRUGS (ALT 637 FOR MEDICARE OP): Performed by: EMERGENCY MEDICINE

## 2025-07-06 PROCEDURE — 99284 EMERGENCY DEPT VISIT MOD MDM: CPT | Mod: 25 | Performed by: STUDENT IN AN ORGANIZED HEALTH CARE EDUCATION/TRAINING PROGRAM

## 2025-07-06 PROCEDURE — 96365 THER/PROPH/DIAG IV INF INIT: CPT

## 2025-07-06 PROCEDURE — 85025 COMPLETE CBC W/AUTO DIFF WBC: CPT | Performed by: EMERGENCY MEDICINE

## 2025-07-06 PROCEDURE — 87075 CULTR BACTERIA EXCEPT BLOOD: CPT | Mod: STJLAB | Performed by: EMERGENCY MEDICINE

## 2025-07-06 PROCEDURE — 71046 X-RAY EXAM CHEST 2 VIEWS: CPT

## 2025-07-06 PROCEDURE — 80053 COMPREHEN METABOLIC PANEL: CPT | Performed by: EMERGENCY MEDICINE

## 2025-07-06 RX ORDER — CEFDINIR 300 MG/1
300 CAPSULE ORAL ONCE
Status: COMPLETED | OUTPATIENT
Start: 2025-07-06 | End: 2025-07-06

## 2025-07-06 RX ORDER — CEFDINIR 300 MG/1
300 CAPSULE ORAL 2 TIMES DAILY
Qty: 19 CAPSULE | Refills: 0 | Status: SHIPPED | OUTPATIENT
Start: 2025-07-06 | End: 2025-07-12 | Stop reason: HOSPADM

## 2025-07-06 RX ORDER — CEFEPIME HYDROCHLORIDE 2 G/50ML
2 INJECTION, SOLUTION INTRAVENOUS ONCE
Status: COMPLETED | OUTPATIENT
Start: 2025-07-06 | End: 2025-07-06

## 2025-07-06 RX ADMIN — CEFDINIR 300 MG: 300 CAPSULE ORAL at 10:58

## 2025-07-06 RX ADMIN — CEFEPIME HYDROCHLORIDE 2 G: 2 INJECTION, SOLUTION INTRAVENOUS at 09:22

## 2025-07-06 NOTE — DISCHARGE INSTRUCTIONS
Please follow-up with your oncology team in 48 hours if your symptoms are not improving.    Please return to the ER or seek immediate medical attention if you experience worsening headache, vomiting, fever of 38C (100.4) or higher, vision changes, confusion, loss of motion or feeling in your arms or legs, loss of control of your urine or stool, neck pain, fainting, seizure, or any new or worsening symptoms.     You are welcome back any time. Thank you for entrusting your care to us, I hope we made your visit as pleasant as possible. Wishing you well!    Dr. Thapa

## 2025-07-06 NOTE — ED PROVIDER NOTES
EMERGENCY DEPARTMENT ENCOUNTER      Pt Name: Mica Vanessa  MRN: [id number]  Birthdate 1955  Date of evaluation: 7/6/2025  Provider: Willard Thapa DO    CHIEF COMPLAINT       Chief Complaint   Patient presents with    Fever     Patient states she is here for a fever. Had chemo 6/23 and an ear infection last week has been taking augmentin . Still c/o left ear pressure     HISTORY OF PRESENT ILLNESS    Mica Vanessa is a 69 y.o. year old female who presents to the ER for fever. Has had an ear and sinus infection x 1 week, currently on augmentin. No missed doses. Yesterday developed a chill, checked her temp, was 98.5. 102 this AM by oral thermometer. Has been taking motrin, ran out of tylenol. Last motrin dose 0400.  Has been taking 2-3 Q 5-6 hours.   Has had cough since symptoms started, became productive about 3-4 days.   No chest pain, abdominal pain, nausea, vomiting, dyspnea, dysuria. Does report urinary urgency, may be chemo related.    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, on chemo currently, last chemo June 23rd, next chemo July 14th, onc: [facility]; breast cancer s/p chemo and radiation 2007  PSH uterine removal 12/2023     PAST MEDICAL HISTORY   Medical History[1]  CURRENT MEDICATIONS       Discharge Medication List as of 7/6/2025 11:28 AM        CONTINUE these medications which have NOT CHANGED    Details   amoxicillin-clavulanate (Augmentin) 875-125 mg tablet Take 1 tablet by mouth 2 times a day for 7 days., Starting Mon 6/30/2025, Until Mon 7/7/2025, Normal      dexAMETHasone (Decadron) 4 mg tablet Take 5 tablets (20 mg) by mouth once 12 hours prior to PACLitaxel treatment. Then take 2 tablets (8 mg) by mouth once daily for 3 days starting the day after treatment., Normal      escitalopram (Lexapro) 10 mg tablet Take 1 tablet (10 mg) by mouth once daily., Starting Wed 2/19/2025, Until Mon 8/18/2025, Normal      lisinopril 20 mg tablet Take 1 tablet (20 mg) by  mouth once daily in the morning. Take before meals., Starting Thu 5/22/2025, Historical Med      loratadine (Claritin Reditabs) 10 mg disintegrating tablet Dissolve 1 tablet (10 mg) in the mouth once daily., Historical Med      LORazepam (Ativan) 0.5 mg tablet Take 1 tablet (0.5 mg) by mouth every 8 hours if needed for anxiety for up to 10 days., Starting Tue 3/25/2025, Until Fri 4/4/2025 at 2359, Normal      magnesium gluconate (Magonate) 27 mg magnesium (500 mg) tablet Take 2 tablets (54 mg) by mouth 2 times a day., Starting Fri 3/21/2025, Normal      ondansetron (Zofran) 8 mg tablet Take 1 tablet (8 mg) by mouth every 8 hours if needed for nausea or vomiting., Starting Fri 3/21/2025, Normal      prochlorperazine (Compazine) 10 mg tablet Take 1 tablet (10 mg) by mouth every 6 hours if needed for nausea or vomiting., Starting Fri 3/21/2025, Normal      propranolol (Inderal) 10 mg tablet TAKE 1 TABLET (10 MG) BY MOUTH 2 TIMES A DAY AS NEEDED (ANXIETY, PALPITATIONS)., Historical Med      sennosides (senna) 8.6 mg capsule Take 1 capsule (8.6 mg) by mouth once daily at bedtime., Starting Mon 4/21/2025, Normal           SURGICAL HISTORY     Surgical History[2]  ALLERGIES     Patient has no known allergies.  FAMILY HISTORY     Family History[3]  SOCIAL HISTORY     Social History[4]  PHYSICAL EXAM  (up to 7 for level 4, 8 or more for level 5)     ED Triage Vitals [07/06/25 0803]   Temperature Heart Rate Respirations BP   36.1 °C (97 °F) 100 19 144/68      Pulse Ox Temp Source Heart Rate Source Patient Position   96 % Temporal Monitor Sitting      BP Location FiO2 (%)     Left arm --       Physical Exam  Vitals and nursing note reviewed.   Constitutional:       General: She is not in acute distress.     Appearance: Normal appearance. She is not ill-appearing.   HENT:      Head: Normocephalic and atraumatic. No raccoon eyes, Braswell's sign, contusion or laceration.      Jaw: No trismus or malocclusion.      Right Ear:  External ear normal. Tympanic membrane is bulging.      Left Ear: External ear normal. Tympanic membrane is bulging.      Mouth/Throat:      Mouth: Mucous membranes are moist.      Pharynx: Oropharynx is clear.   Eyes:      Extraocular Movements: Extraocular movements intact.      Pupils: Pupils are equal, round, and reactive to light.   Neck:      Trachea: No tracheal deviation.   Cardiovascular:      Rate and Rhythm: Regular rhythm. Tachycardia present.      Pulses: Normal pulses.   Pulmonary:      Effort: No respiratory distress.      Breath sounds: No wheezing, rhonchi or rales.   Chest:      Chest wall: No tenderness.   Abdominal:      General: Abdomen is flat.      Palpations: Abdomen is soft. There is no mass.      Tenderness: There is no abdominal tenderness.   Musculoskeletal:         General: No tenderness or signs of injury.      Right lower leg: No edema.      Left lower leg: No edema.   Skin:     Coloration: Skin is not jaundiced or pale.      Findings: No petechiae, rash or wound.   Neurological:      Mental Status: She is alert.      Sensory: No sensory deficit.      Motor: No weakness.        DIAGNOSTIC RESULTS   LABS:  Labs Reviewed   CBC WITH AUTO DIFFERENTIAL - Abnormal       Result Value    WBC 8.6      nRBC 0.0      RBC 3.72 (*)     Hemoglobin 12.0      Hematocrit 35.7 (*)     MCV 96      MCH 32.3      MCHC 33.6      RDW 13.5      Platelets 175      Neutrophils % 79.8      Immature Granulocytes %, Automated 1.1 (*)     Lymphocytes % 10.4      Monocytes % 8.1      Eosinophils % 0.1      Basophils % 0.5      Neutrophils Absolute 6.83      Immature Granulocytes Absolute, Automated 0.09      Lymphocytes Absolute 0.89 (*)     Monocytes Absolute 0.69      Eosinophils Absolute 0.01      Basophils Absolute 0.04     COMPREHENSIVE METABOLIC PANEL - Abnormal    Glucose 92      Sodium 134 (*)     Potassium 3.6      Chloride 98      Bicarbonate 28      Anion Gap 12      Urea Nitrogen 14      Creatinine 0.52       eGFR >90      Calcium 9.1      Albumin 4.2      Alkaline Phosphatase 77      Total Protein 6.6      AST 15      Bilirubin, Total 0.6      ALT 20     URINALYSIS WITH REFLEX CULTURE AND MICROSCOPIC - Abnormal    Color, Urine Yellow      Appearance, Urine Clear      Specific Gravity, Urine 1.024      pH, Urine 5.5      Protein, Urine 20 (TRACE)      Glucose, Urine Normal      Blood, Urine 1.0 (3+) (*)     Ketones, Urine 10 (1+) (*)     Bilirubin, Urine NEGATIVE      Urobilinogen, Urine Normal      Nitrite, Urine NEGATIVE      Leukocyte Esterase, Urine NEGATIVE     URINALYSIS MICROSCOPIC WITH REFLEX CULTURE - Abnormal    WBC, Urine 1-5      RBC, Urine 11-20 (*)     Mucus, Urine 1+     PROTIME-INR - Normal    Protime 12.1      INR 1.1     APTT - Normal    aPTT 27      Narrative:     The APTT is no longer used for monitoring Unfractionated Heparin Therapy. For monitoring Heparin Therapy, use the Heparin Assay.   SARS-COV-2 AND INFLUENZA A/B PCR - Normal    Flu A Result Not Detected      Flu B Result Not Detected      Coronavirus 2019, PCR Not Detected      Narrative:     This assay is an FDA-cleared, in vitro diagnostic nucleic acid amplification test for the qualitative detection and differentiation of SARS CoV-2/ Influenza A/B from nasopharyngeal specimens collected from individuals with signs and symptoms of respiratory tract infections, and has been validated for use at [facility]. Negative results do not preclude COVID-19/ Influenza A/B infections and should not be used as the sole basis for diagnosis, treatment, or other management decisions. Testing for SARS CoV-2 is recommended only for patients who meet current clinical and/or epidemiological criteria defined by federal, state, or local public health directives.   LACTATE - Normal    Lactate 1.0      Narrative:     Venipuncture immediately after or during the administration of Metamizole may lead to falsely low results. Testing should  "be performed immediately prior to Metamizole dosing.   BLOOD CULTURE   BLOOD CULTURE   URINALYSIS WITH REFLEX CULTURE AND MICROSCOPIC    Narrative:     The following orders were created for panel order Urinalysis with Reflex Culture and Microscopic.  Procedure                               Abnormality         Status                     ---------                               -----------         ------                     Urinalysis with Reflex C...[[id number]]  Abnormal            Final result               Extra Urine Gray Tube[[id number]]                            In process                   Please view results for these tests on the individual orders.   EXTRA URINE GRAY TUBE     All other labs were within normal range or not returned as of this dictation.  Imaging  XR chest 2 views   Final Result   No acute pulmonary pathology.   Signed by Ashvin Garcia MD         Procedure  Procedures  EMERGENCY DEPARTMENT COURSE/MDM:   Medical Decision Making    Vitals:    Vitals:    07/06/25 0803 07/06/25 0912 07/06/25 0930 07/06/25 1100   BP: 144/68  109/74 144/61   BP Location: Left arm      Patient Position: Sitting      Pulse: 100   88   Resp: 19   16   Temp: 36.1 °C (97 °F) 37 °C (98.6 °F)     TempSrc: Temporal Oral     SpO2: 96%  98% 96%   Weight: 61.7 kg (136 lb)      Height: 1.575 m (5' 2\")        Mica Vanessa is a female 69 y.o. who presents to the ER for fever. On arrival the patients vital signs were: Afebrile, regular heart rate, normotensive, regular respiration rate, normoxic on room air. History obtained from: patient.  Given cancer history undergoing active chemo considerations include UTI, pneumonia, failure of otitis treatment.  Plan for infectious workup including CBC, CMP, UA, viral swabs, lactate, chest x-ray.  Will provide empiric cefepime for broad-spectrum antibiotic coverage.   ED Course as of 07/06/25 1418   Sun Jul 06, 2025   1020 Comprehensive Metabolic Panel(!)  Normoglycemic, no acute " electrolyte or hepatorenal abnormality [CB]   1020 Coronavirus 2019, PCR: Not Detected [CB]   1020 Flu B Result: Not Detected [CB]   1020 Flu A Result: Not Detected [CB]   1020 Protime-INR  No coagulopathy [CB]   1020 Lactate: 1.0  Not elevated doubt occult shock [CB]   1020 CBC and Auto Differential(!)  No acute leukocytosis, leukopenia, thrombocytosis, thrombocytopenia, or anemia.  Not neutropenic. [CB]   1048 XR chest 2 views  No acute pulmonary pathology. [CB]   1048 Discussed with gyn - onc, Dr. Delgado in call for franklin Tidwell for discharge with cefdinir. Should call for follow up in 48 hours if not improving.  [CB]   1127 Urinalysis with Reflex Culture and Microscopic(!)  No bacteria nitrite or leukocyte esterase to suggest UTI [CB]      ED Course User Index  [CB] Willard Thapa DO         Diagnoses as of 07/06/25 1418   Recurrent acute suppurative otitis media without spontaneous rupture of tympanic membrane of both sides       External Records Reviewed: I reviewed recent and relevant outside records including inpatient notes, outpatient records  Prescription Drug Consideration: Cefdinir prescribed for bilateral otitis    Shared decision making for disposition  Patient and/or patient´s representative was counseled regarding labs, imaging, likely diagnosis. All questions were answered. Recommendation was made   for discharge home. The patient agreed and was discharged home in stable condition with appropriate relevant educational materials. Return precautions were provided which included worsening headache, vomiting, fever of 38C (100.4) or higher, vision changes, confusion, loss of motion or feeling in your arms or legs, loss of control of your urine or stool, neck pain, fainting, seizure, or any new or worsening symptoms. .     ED Medications administered this visit:    Medications   cefepime (Maxipime) 2 g in dextrose 5% IV 50 mL (0 g intravenous Stopped 7/6/25 1023)   cefdinir (Omnicef) capsule 300  mg (300 mg oral Given 25 1058)       New Prescriptions from this visit:    Discharge Medication List as of 2025 11:28 AM        START taking these medications    Details   cefdinir (Omnicef) 300 mg capsule Take 1 capsule (300 mg) by mouth 2 times a day for 10 days., Starting Sun 2025, Until Wed 2025, Normal                Final Impression:   1. Recurrent acute suppurative otitis media without spontaneous rupture of tympanic membrane of both sides          Please excuse any misspellings or unintended errors related to the Dragon speech recognition software used to dictate this note.    I reviewed the case with the attending ED physician. The attending ED physician agrees with the plan.            [1]   Past Medical History:  Diagnosis Date    Abnormal ECG     hx of    Anxiety 10/2024    Arthritis     Some in spine    Khan esophagus     Breast cancer     chemo/radiation right sided lumpectomy    Dysfunctional uterine bleeding     Essential (primary) hypertension 2022    Benign essential hypertension    GERD (gastroesophageal reflux disease)     Hx antineoplastic chemo     Hyperlipidemia     Osteopenia     Other seasonal allergic rhinitis     Seasonal allergies    Personal history of irradiation     Personal history of malignant neoplasm of breast     History of malignant neoplasm of breast    Personal history of other diseases of the circulatory system     History of hypertension    Personal history of other diseases of the digestive system     History of gastrointestinal disorder   [2]   Past Surgical History:  Procedure Laterality Date    BREAST BIOPSY      BREAST LUMPECTOMY       SECTION, LOW TRANSVERSE  83, 84, 91    HYSTERECTOMY  2023    LUMBAR PERITONEAL SHUNT      OTHER SURGICAL HISTORY  2022     section    OTHER SURGICAL HISTORY  2022    Tonsillectomy    OTHER SURGICAL HISTORY  2022    Lumpectomy    SKIN CANCER EXCISION      vulva     TONSILLECTOMY  1960    TUBAL LIGATION     [3]   Family History  Problem Relation Name Age of Onset    Osteoporosis Mother      Thyroid disease Mother      Hypertension Father      Breast cancer Sibling      Breast cancer Daughter      Stroke Other Grandparent     Hypertension Other Grandparent     Thyroid disease Mother Mine Dannemiller     Arthritis Mother Mine Dannemiller     Osteoporosis Mother Mine Dannemiller     Cancer Sister Sonia Spicer     Breast cancer Sister Sonia Spicer     Hypertension Father Juan Antonio Arredondo     Cancer Sister Sonia Spicer     Arthritis Mother Mine Sueroiller     Cancer Sister Sonia Spicer     Breast cancer Sister Sonia Spicer     BRCA1 Negative Sister Sonia Spicer     Hypertension Father Juan Antonio Arredondo     Breast cancer Daughter Janet Vanessa     BRCA1 Negative Daughter Janet Vanessa     Cancer Sister Sonia Spicer     Cancer Sister Sonia Spicer    [4]   Social History  Tobacco Use    Smoking status: Former     Current packs/day: 0.00     Average packs/day: 0.5 packs/day for 32.0 years (16.0 ttl pk-yrs)     Types: Cigarettes     Start date: 1975     Quit date: 2007     Years since quittin.0    Smokeless tobacco: Never    Tobacco comments:     Used infrequent since    Vaping Use    Vaping status: Former   Substance Use Topics    Alcohol use: Never    Drug use: Not Currently     Types: Marijuana        Willard Thapa DO  Resident  25 1411

## 2025-07-06 NOTE — Clinical Note
Mica Vanessa was seen and treated in our emergency department on 7/6/2025.  She may return to work on 07/07/2025.       If you have any questions or concerns, please don't hesitate to call.      Valentino Ford MD

## 2025-07-07 LAB — HOLD SPECIMEN: NORMAL

## 2025-07-09 ENCOUNTER — HOSPITAL ENCOUNTER (INPATIENT)
Facility: HOSPITAL | Age: 70
LOS: 3 days | Discharge: HOME HEALTH | DRG: 153 | End: 2025-07-12
Attending: STUDENT IN AN ORGANIZED HEALTH CARE EDUCATION/TRAINING PROGRAM | Admitting: STUDENT IN AN ORGANIZED HEALTH CARE EDUCATION/TRAINING PROGRAM
Payer: MEDICARE

## 2025-07-09 ENCOUNTER — TELEPHONE (OUTPATIENT)
Dept: GYNECOLOGIC ONCOLOGY | Facility: HOSPITAL | Age: 70
End: 2025-07-09
Payer: MEDICARE

## 2025-07-09 ENCOUNTER — APPOINTMENT (OUTPATIENT)
Dept: RADIOLOGY | Facility: HOSPITAL | Age: 70
DRG: 153 | End: 2025-07-09
Payer: MEDICARE

## 2025-07-09 ENCOUNTER — APPOINTMENT (OUTPATIENT)
Dept: CARDIOLOGY | Facility: HOSPITAL | Age: 70
DRG: 153 | End: 2025-07-09
Payer: MEDICARE

## 2025-07-09 DIAGNOSIS — E87.1 HYPONATREMIA: ICD-10-CM

## 2025-07-09 DIAGNOSIS — Z51.11 CHEMOTHERAPY MANAGEMENT, ENCOUNTER FOR: ICD-10-CM

## 2025-07-09 DIAGNOSIS — R68.83 SHAKING CHILLS: ICD-10-CM

## 2025-07-09 DIAGNOSIS — R50.9 FEVER, UNSPECIFIED FEVER CAUSE: Primary | ICD-10-CM

## 2025-07-09 DIAGNOSIS — J32.9 SINUSITIS, UNSPECIFIED CHRONICITY, UNSPECIFIED LOCATION: ICD-10-CM

## 2025-07-09 LAB
ALBUMIN SERPL BCP-MCNC: 4.6 G/DL (ref 3.4–5)
ALP SERPL-CCNC: 71 U/L (ref 33–136)
ALT SERPL W P-5'-P-CCNC: 20 U/L (ref 7–45)
ANION GAP SERPL CALC-SCNC: 16 MMOL/L (ref 10–20)
AST SERPL W P-5'-P-CCNC: 15 U/L (ref 9–39)
BASOPHILS # BLD AUTO: 0.03 X10*3/UL (ref 0–0.1)
BASOPHILS NFR BLD AUTO: 0.4 %
BILIRUB SERPL-MCNC: 0.7 MG/DL (ref 0–1.2)
BUN SERPL-MCNC: 19 MG/DL (ref 6–23)
CALCIUM SERPL-MCNC: 9.4 MG/DL (ref 8.6–10.3)
CARDIAC TROPONIN I PNL SERPL HS: 7 NG/L (ref 0–13)
CHLORIDE SERPL-SCNC: 94 MMOL/L (ref 98–107)
CO2 SERPL-SCNC: 24 MMOL/L (ref 21–32)
CREAT SERPL-MCNC: 0.65 MG/DL (ref 0.5–1.05)
EGFRCR SERPLBLD CKD-EPI 2021: >90 ML/MIN/1.73M*2
EOSINOPHIL # BLD AUTO: 0 X10*3/UL (ref 0–0.7)
EOSINOPHIL NFR BLD AUTO: 0 %
ERYTHROCYTE [DISTWIDTH] IN BLOOD BY AUTOMATED COUNT: 13.2 % (ref 11.5–14.5)
FLUAV RNA RESP QL NAA+PROBE: NOT DETECTED
FLUBV RNA RESP QL NAA+PROBE: NOT DETECTED
GLUCOSE SERPL-MCNC: 96 MG/DL (ref 74–99)
HCT VFR BLD AUTO: 36.9 % (ref 36–46)
HGB BLD-MCNC: 12.6 G/DL (ref 12–16)
IMM GRANULOCYTES # BLD AUTO: 0.04 X10*3/UL (ref 0–0.7)
IMM GRANULOCYTES NFR BLD AUTO: 0.5 % (ref 0–0.9)
LACTATE SERPL-SCNC: 1.5 MMOL/L (ref 0.4–2)
LYMPHOCYTES # BLD AUTO: 0.9 X10*3/UL (ref 1.2–4.8)
LYMPHOCYTES NFR BLD AUTO: 12.2 %
MCH RBC QN AUTO: 32.1 PG (ref 26–34)
MCHC RBC AUTO-ENTMCNC: 34.1 G/DL (ref 32–36)
MCV RBC AUTO: 94 FL (ref 80–100)
MONOCYTES # BLD AUTO: 0.59 X10*3/UL (ref 0.1–1)
MONOCYTES NFR BLD AUTO: 8 %
NEUTROPHILS # BLD AUTO: 5.8 X10*3/UL (ref 1.2–7.7)
NEUTROPHILS NFR BLD AUTO: 78.9 %
NRBC BLD-RTO: 0 /100 WBCS (ref 0–0)
PLATELET # BLD AUTO: 305 X10*3/UL (ref 150–450)
POTASSIUM SERPL-SCNC: 3.7 MMOL/L (ref 3.5–5.3)
PROT SERPL-MCNC: 7 G/DL (ref 6.4–8.2)
RBC # BLD AUTO: 3.92 X10*6/UL (ref 4–5.2)
SARS-COV-2 RNA RESP QL NAA+PROBE: NOT DETECTED
SODIUM SERPL-SCNC: 130 MMOL/L (ref 136–145)
WBC # BLD AUTO: 7.4 X10*3/UL (ref 4.4–11.3)

## 2025-07-09 PROCEDURE — 2500000004 HC RX 250 GENERAL PHARMACY W/ HCPCS (ALT 636 FOR OP/ED)

## 2025-07-09 PROCEDURE — 96361 HYDRATE IV INFUSION ADD-ON: CPT

## 2025-07-09 PROCEDURE — 71045 X-RAY EXAM CHEST 1 VIEW: CPT

## 2025-07-09 PROCEDURE — 85025 COMPLETE CBC W/AUTO DIFF WBC: CPT | Performed by: STUDENT IN AN ORGANIZED HEALTH CARE EDUCATION/TRAINING PROGRAM

## 2025-07-09 PROCEDURE — 83605 ASSAY OF LACTIC ACID: CPT | Performed by: STUDENT IN AN ORGANIZED HEALTH CARE EDUCATION/TRAINING PROGRAM

## 2025-07-09 PROCEDURE — 36415 COLL VENOUS BLD VENIPUNCTURE: CPT | Performed by: STUDENT IN AN ORGANIZED HEALTH CARE EDUCATION/TRAINING PROGRAM

## 2025-07-09 PROCEDURE — 87636 SARSCOV2 & INF A&B AMP PRB: CPT | Performed by: STUDENT IN AN ORGANIZED HEALTH CARE EDUCATION/TRAINING PROGRAM

## 2025-07-09 PROCEDURE — 93005 ELECTROCARDIOGRAM TRACING: CPT

## 2025-07-09 PROCEDURE — 2500000001 HC RX 250 WO HCPCS SELF ADMINISTERED DRUGS (ALT 637 FOR MEDICARE OP)

## 2025-07-09 PROCEDURE — 71045 X-RAY EXAM CHEST 1 VIEW: CPT | Mod: FOREIGN READ | Performed by: RADIOLOGY

## 2025-07-09 PROCEDURE — 99285 EMERGENCY DEPT VISIT HI MDM: CPT | Performed by: PHYSICIAN ASSISTANT

## 2025-07-09 PROCEDURE — 84484 ASSAY OF TROPONIN QUANT: CPT | Performed by: STUDENT IN AN ORGANIZED HEALTH CARE EDUCATION/TRAINING PROGRAM

## 2025-07-09 PROCEDURE — 87075 CULTR BACTERIA EXCEPT BLOOD: CPT | Mod: STJLAB | Performed by: STUDENT IN AN ORGANIZED HEALTH CARE EDUCATION/TRAINING PROGRAM

## 2025-07-09 PROCEDURE — 99223 1ST HOSP IP/OBS HIGH 75: CPT

## 2025-07-09 PROCEDURE — 2500000004 HC RX 250 GENERAL PHARMACY W/ HCPCS (ALT 636 FOR OP/ED): Performed by: STUDENT IN AN ORGANIZED HEALTH CARE EDUCATION/TRAINING PROGRAM

## 2025-07-09 PROCEDURE — 1100000001 HC PRIVATE ROOM DAILY

## 2025-07-09 PROCEDURE — 80053 COMPREHEN METABOLIC PANEL: CPT | Performed by: STUDENT IN AN ORGANIZED HEALTH CARE EDUCATION/TRAINING PROGRAM

## 2025-07-09 PROCEDURE — 99285 EMERGENCY DEPT VISIT HI MDM: CPT | Mod: 25 | Performed by: STUDENT IN AN ORGANIZED HEALTH CARE EDUCATION/TRAINING PROGRAM

## 2025-07-09 PROCEDURE — 96372 THER/PROPH/DIAG INJ SC/IM: CPT

## 2025-07-09 PROCEDURE — 96365 THER/PROPH/DIAG IV INF INIT: CPT | Mod: 59

## 2025-07-09 RX ORDER — CEFEPIME HYDROCHLORIDE 2 G/50ML
2 INJECTION, SOLUTION INTRAVENOUS EVERY 8 HOURS
Status: DISCONTINUED | OUTPATIENT
Start: 2025-07-10 | End: 2025-07-11

## 2025-07-09 RX ORDER — CEFEPIME HYDROCHLORIDE 2 G/50ML
2 INJECTION, SOLUTION INTRAVENOUS ONCE
Status: COMPLETED | OUTPATIENT
Start: 2025-07-09 | End: 2025-07-09

## 2025-07-09 RX ORDER — ESCITALOPRAM OXALATE 10 MG/1
10 TABLET ORAL DAILY
Status: DISCONTINUED | OUTPATIENT
Start: 2025-07-09 | End: 2025-07-12 | Stop reason: HOSPADM

## 2025-07-09 RX ORDER — PROCHLORPERAZINE MALEATE 10 MG
10 TABLET ORAL EVERY 6 HOURS PRN
Status: DISCONTINUED | OUTPATIENT
Start: 2025-07-09 | End: 2025-07-10

## 2025-07-09 RX ORDER — LISINOPRIL 20 MG/1
20 TABLET ORAL
Status: DISCONTINUED | OUTPATIENT
Start: 2025-07-10 | End: 2025-07-12 | Stop reason: HOSPADM

## 2025-07-09 RX ORDER — ONDANSETRON 4 MG/1
8 TABLET, FILM COATED ORAL EVERY 8 HOURS PRN
Status: DISCONTINUED | OUTPATIENT
Start: 2025-07-09 | End: 2025-07-10

## 2025-07-09 RX ORDER — ACETAMINOPHEN 325 MG/1
975 TABLET ORAL ONCE
Status: COMPLETED | OUTPATIENT
Start: 2025-07-09 | End: 2025-07-09

## 2025-07-09 RX ORDER — ENOXAPARIN SODIUM 100 MG/ML
40 INJECTION SUBCUTANEOUS EVERY 24 HOURS
Status: DISCONTINUED | OUTPATIENT
Start: 2025-07-09 | End: 2025-07-12 | Stop reason: HOSPADM

## 2025-07-09 RX ORDER — PROPRANOLOL HYDROCHLORIDE 20 MG/1
10 TABLET ORAL 2 TIMES DAILY
Status: DISCONTINUED | OUTPATIENT
Start: 2025-07-09 | End: 2025-07-10

## 2025-07-09 RX ADMIN — CEFEPIME HYDROCHLORIDE 2 G: 2 INJECTION, SOLUTION INTRAVENOUS at 17:57

## 2025-07-09 RX ADMIN — ACETAMINOPHEN 975 MG: 325 TABLET ORAL at 22:02

## 2025-07-09 RX ADMIN — ESCITALOPRAM OXALATE 10 MG: 10 TABLET ORAL at 22:02

## 2025-07-09 RX ADMIN — ENOXAPARIN SODIUM 40 MG: 100 INJECTION SUBCUTANEOUS at 22:02

## 2025-07-09 RX ADMIN — SODIUM CHLORIDE, SODIUM LACTATE, POTASSIUM CHLORIDE, AND CALCIUM CHLORIDE 1000 ML: .6; .31; .03; .02 INJECTION, SOLUTION INTRAVENOUS at 17:56

## 2025-07-09 SDOH — SOCIAL STABILITY: SOCIAL INSECURITY: DO YOU FEEL ANYONE HAS EXPLOITED OR TAKEN ADVANTAGE OF YOU FINANCIALLY OR OF YOUR PERSONAL PROPERTY?: NO

## 2025-07-09 SDOH — ECONOMIC STABILITY: FOOD INSECURITY: WITHIN THE PAST 12 MONTHS, YOU WORRIED THAT YOUR FOOD WOULD RUN OUT BEFORE YOU GOT THE MONEY TO BUY MORE.: NEVER TRUE

## 2025-07-09 SDOH — ECONOMIC STABILITY: FOOD INSECURITY: WITHIN THE PAST 12 MONTHS, THE FOOD YOU BOUGHT JUST DIDN'T LAST AND YOU DIDN'T HAVE MONEY TO GET MORE.: NEVER TRUE

## 2025-07-09 SDOH — SOCIAL STABILITY: SOCIAL INSECURITY
WITHIN THE LAST YEAR, HAVE YOU BEEN KICKED, HIT, SLAPPED, OR OTHERWISE PHYSICALLY HURT BY YOUR PARTNER OR EX-PARTNER?: NO

## 2025-07-09 SDOH — ECONOMIC STABILITY: INCOME INSECURITY: IN THE PAST 12 MONTHS HAS THE ELECTRIC, GAS, OIL, OR WATER COMPANY THREATENED TO SHUT OFF SERVICES IN YOUR HOME?: NO

## 2025-07-09 SDOH — ECONOMIC STABILITY: HOUSING INSECURITY: IN THE PAST 12 MONTHS, HOW MANY TIMES HAVE YOU MOVED WHERE YOU WERE LIVING?: 0

## 2025-07-09 SDOH — HEALTH STABILITY: MENTAL HEALTH: HOW MANY DRINKS CONTAINING ALCOHOL DO YOU HAVE ON A TYPICAL DAY WHEN YOU ARE DRINKING?: PATIENT DOES NOT DRINK

## 2025-07-09 SDOH — SOCIAL STABILITY: SOCIAL INSECURITY: WITHIN THE LAST YEAR, HAVE YOU BEEN AFRAID OF YOUR PARTNER OR EX-PARTNER?: NO

## 2025-07-09 SDOH — ECONOMIC STABILITY: HOUSING INSECURITY: IN THE LAST 12 MONTHS, WAS THERE A TIME WHEN YOU WERE NOT ABLE TO PAY THE MORTGAGE OR RENT ON TIME?: NO

## 2025-07-09 SDOH — ECONOMIC STABILITY: TRANSPORTATION INSECURITY: IN THE PAST 12 MONTHS, HAS LACK OF TRANSPORTATION KEPT YOU FROM MEDICAL APPOINTMENTS OR FROM GETTING MEDICATIONS?: NO

## 2025-07-09 SDOH — HEALTH STABILITY: MENTAL HEALTH: HOW OFTEN DO YOU HAVE SIX OR MORE DRINKS ON ONE OCCASION?: NEVER

## 2025-07-09 SDOH — SOCIAL STABILITY: SOCIAL INSECURITY
WITHIN THE LAST YEAR, HAVE YOU BEEN RAPED OR FORCED TO HAVE ANY KIND OF SEXUAL ACTIVITY BY YOUR PARTNER OR EX-PARTNER?: NO

## 2025-07-09 SDOH — ECONOMIC STABILITY: HOUSING INSECURITY: AT ANY TIME IN THE PAST 12 MONTHS, WERE YOU HOMELESS OR LIVING IN A SHELTER (INCLUDING NOW)?: NO

## 2025-07-09 SDOH — SOCIAL STABILITY: SOCIAL INSECURITY: WITHIN THE LAST YEAR, HAVE YOU BEEN HUMILIATED OR EMOTIONALLY ABUSED IN OTHER WAYS BY YOUR PARTNER OR EX-PARTNER?: NO

## 2025-07-09 SDOH — HEALTH STABILITY: MENTAL HEALTH: HOW OFTEN DO YOU HAVE A DRINK CONTAINING ALCOHOL?: NEVER

## 2025-07-09 SDOH — ECONOMIC STABILITY: FOOD INSECURITY: HOW HARD IS IT FOR YOU TO PAY FOR THE VERY BASICS LIKE FOOD, HOUSING, MEDICAL CARE, AND HEATING?: NOT VERY HARD

## 2025-07-09 SDOH — SOCIAL STABILITY: SOCIAL INSECURITY: WERE YOU ABLE TO COMPLETE ALL THE BEHAVIORAL HEALTH SCREENINGS?: YES

## 2025-07-09 SDOH — SOCIAL STABILITY: SOCIAL INSECURITY: ARE YOU OR HAVE YOU BEEN THREATENED OR ABUSED PHYSICALLY, EMOTIONALLY, OR SEXUALLY BY ANYONE?: NO

## 2025-07-09 SDOH — SOCIAL STABILITY: SOCIAL INSECURITY: ABUSE: ADULT

## 2025-07-09 SDOH — SOCIAL STABILITY: SOCIAL INSECURITY: ARE THERE ANY APPARENT SIGNS OF INJURIES/BEHAVIORS THAT COULD BE RELATED TO ABUSE/NEGLECT?: NO

## 2025-07-09 SDOH — SOCIAL STABILITY: SOCIAL INSECURITY: DOES ANYONE TRY TO KEEP YOU FROM HAVING/CONTACTING OTHER FRIENDS OR DOING THINGS OUTSIDE YOUR HOME?: NO

## 2025-07-09 SDOH — SOCIAL STABILITY: SOCIAL INSECURITY: HAVE YOU HAD ANY THOUGHTS OF HARMING ANYONE ELSE?: NO

## 2025-07-09 SDOH — SOCIAL STABILITY: SOCIAL INSECURITY: HAVE YOU HAD THOUGHTS OF HARMING ANYONE ELSE?: NO

## 2025-07-09 SDOH — SOCIAL STABILITY: SOCIAL INSECURITY: DO YOU FEEL UNSAFE GOING BACK TO THE PLACE WHERE YOU ARE LIVING?: NO

## 2025-07-09 SDOH — SOCIAL STABILITY: SOCIAL INSECURITY: HAS ANYONE EVER THREATENED TO HURT YOUR FAMILY OR YOUR PETS?: NO

## 2025-07-09 ASSESSMENT — LIFESTYLE VARIABLES
HAVE PEOPLE ANNOYED YOU BY CRITICIZING YOUR DRINKING: NO
AUDIT-C TOTAL SCORE: 0
AUDIT-C TOTAL SCORE: 0
HOW OFTEN DO YOU HAVE A DRINK CONTAINING ALCOHOL: NEVER
TOTAL SCORE: 0
SKIP TO QUESTIONS 9-10: 1
SKIP TO QUESTIONS 9-10: 1
EVER FELT BAD OR GUILTY ABOUT YOUR DRINKING: NO
HAVE YOU EVER FELT YOU SHOULD CUT DOWN ON YOUR DRINKING: NO
HOW OFTEN DO YOU HAVE 6 OR MORE DRINKS ON ONE OCCASION: NEVER
EVER HAD A DRINK FIRST THING IN THE MORNING TO STEADY YOUR NERVES TO GET RID OF A HANGOVER: NO
HOW MANY STANDARD DRINKS CONTAINING ALCOHOL DO YOU HAVE ON A TYPICAL DAY: PATIENT DOES NOT DRINK
AUDIT-C TOTAL SCORE: 0

## 2025-07-09 ASSESSMENT — ACTIVITIES OF DAILY LIVING (ADL)
JUDGMENT_ADEQUATE_SAFELY_COMPLETE_DAILY_ACTIVITIES: YES
LACK_OF_TRANSPORTATION: NO
LACK_OF_TRANSPORTATION: NO
PATIENT'S MEMORY ADEQUATE TO SAFELY COMPLETE DAILY ACTIVITIES?: YES
LACK_OF_TRANSPORTATION: NO
HEARING - RIGHT EAR: FUNCTIONAL
HEARING - LEFT EAR: FUNCTIONAL
DRESSING YOURSELF: INDEPENDENT
WALKS IN HOME: NEEDS ASSISTANCE
GROOMING: INDEPENDENT
ASSISTIVE_DEVICE: WALKER;CANE
ADEQUATE_TO_COMPLETE_ADL: YES
TOILETING: INDEPENDENT
BATHING: INDEPENDENT
FEEDING YOURSELF: INDEPENDENT

## 2025-07-09 ASSESSMENT — COGNITIVE AND FUNCTIONAL STATUS - GENERAL
PATIENT BASELINE BEDBOUND: NO
MOVING TO AND FROM BED TO CHAIR: A LITTLE
CLIMB 3 TO 5 STEPS WITH RAILING: A LITTLE
STANDING UP FROM CHAIR USING ARMS: A LITTLE
MOBILITY SCORE: 20
WALKING IN HOSPITAL ROOM: A LITTLE
DAILY ACTIVITIY SCORE: 24

## 2025-07-09 ASSESSMENT — PAIN DESCRIPTION - DESCRIPTORS: DESCRIPTORS: ACHING;DISCOMFORT

## 2025-07-09 ASSESSMENT — PAIN - FUNCTIONAL ASSESSMENT
PAIN_FUNCTIONAL_ASSESSMENT: 0-10

## 2025-07-09 ASSESSMENT — PATIENT HEALTH QUESTIONNAIRE - PHQ9
1. LITTLE INTEREST OR PLEASURE IN DOING THINGS: NOT AT ALL
2. FEELING DOWN, DEPRESSED OR HOPELESS: NOT AT ALL
SUM OF ALL RESPONSES TO PHQ9 QUESTIONS 1 & 2: 0

## 2025-07-09 ASSESSMENT — PAIN SCALES - GENERAL
PAINLEVEL_OUTOF10: 0 - NO PAIN
PAINLEVEL_OUTOF10: 4
PAINLEVEL_OUTOF10: 0 - NO PAIN

## 2025-07-09 NOTE — TELEPHONE ENCOUNTER
"Patient called back this afternoon and left a message about symptoms of shaking, that she could not stop, sounded unintelligible at the end of message. I called her back and woke her from a nap. She states she has been uncontrollably shaking for the last \"couple of days\". She has not taken her temperature. I asked that she take her temperature while we were speaking, it was 102 degrees. We discussed possibility of sepsis and that I was concerned and I advised heading to the ED immediately as it seems these symptoms have been occurring for the last 48 hours or so and also that she had been on antibiotics for a double ear infection already. I asked that she not drive if possible, she states that she has a  and will be headed to [facility] ED right now. Will route message to Dr. Palacio for awareness.   "

## 2025-07-09 NOTE — ED PROVIDER NOTES
HPI   Chief Complaint   Patient presents with    Fever    Shaking     Pt is a cancer pt, last chemo x2 weeks ago; pt reports fever since Sunday, w/shaking since Monday w/feeling off balance x2 days.        HPI  Patient is a 69-year-old female with a past medical history of hypertension, anemia, Khan's esophagus, uterine cancer with lung mets s/p radiation, on chemo currently last chemo session 2 weeks ago who presents to the ED for evaluation of shaking and fevers.  Of note patient was treated for recurrent acute suppurative otitis media on 7/6/2025 and discharged on course of cefdinir.  Reports her ear pain is improved although not 100% back to normal.  Patient reports Saturday night she started experiencing shaking, disequilibrium, dizziness.  Highest temperature at home has been 102 °F.  She takes aspirin and Tylenol which bring down her fever.  Her last dose of Tylenol or aspirin was over 6 hours ago.  Temperature on arrival is 99.1 °F.  Patient reports she was sleeping in the cot so when she woke up she did not have a tremor or shaking.  As we talked about it more she began shaking.  Patient also reports she is having difficulty finding words.   at bedside supports this history of her appearing more confused.  Denies nausea, vomiting, shortness of breath, chest pain, diarrhea, urinary symptoms.    Active regimen per Dr. Palacio:  Pembrolizumab + PACLitaxel / CARBOplatin, 21 Day Cycles followed by Pembrolizumab       Patient History   Medical History[1]  Surgical History[2]  Family History[3]  Social History[4]    Physical Exam   ED Triage Vitals [07/09/25 1606]   Temperature Heart Rate Respirations BP   37.3 °C (99.1 °F) (!) 112 20 143/61      Pulse Ox Temp Source Heart Rate Source Patient Position   97 % Temporal -- --      BP Location FiO2 (%)     -- --       Physical Exam  Vitals reviewed.   Constitutional:       General: She is not in acute distress.     Appearance: Normal appearance. She is not  ill-appearing.   HENT:      Head: Normocephalic and atraumatic.      Right Ear: Tympanic membrane, ear canal and external ear normal. There is no impacted cerumen.      Left Ear: Tympanic membrane, ear canal and external ear normal. There is no impacted cerumen.      Nose: Nose normal. No congestion or rhinorrhea.      Mouth/Throat:      Mouth: Mucous membranes are moist.      Pharynx: Oropharynx is clear. No oropharyngeal exudate or posterior oropharyngeal erythema.   Eyes:      General: No visual field deficit or scleral icterus.        Right eye: No discharge.         Left eye: No discharge.      Conjunctiva/sclera: Conjunctivae normal.      Pupils: Pupils are equal, round, and reactive to light.   Cardiovascular:      Rate and Rhythm: Regular rhythm. Tachycardia present.      Pulses: Normal pulses.      Heart sounds: Normal heart sounds. No murmur heard.     No friction rub.   Pulmonary:      Effort: Pulmonary effort is normal. No respiratory distress.      Breath sounds: Normal breath sounds. No stridor. No wheezing or rhonchi.   Abdominal:      General: Abdomen is flat. There is no distension.      Tenderness: There is no abdominal tenderness. There is no guarding or rebound.   Musculoskeletal:      Cervical back: Neck supple. No tenderness.   Skin:     General: Skin is warm and dry.      Capillary Refill: Capillary refill takes less than 2 seconds.      Coloration: Skin is not jaundiced or pale.      Findings: No bruising.   Neurological:      General: No focal deficit present.      Mental Status: She is alert and oriented to person, place, and time.      GCS: GCS eye subscore is 4. GCS verbal subscore is 5. GCS motor subscore is 6.      Cranial Nerves: No cranial nerve deficit, dysarthria or facial asymmetry.      Motor: Tremor present. No weakness, abnormal muscle tone or pronator drift.      Coordination: Finger-Nose-Finger Test normal.   Psychiatric:         Mood and Affect: Mood normal.         Behavior:  Behavior normal.           ED Course & MDM   ED Course as of 07/09/25 2215 Wed Jul 09, 2025   1659 Independent interpretation of ECG obtained for fevers shows sinus tachycardia with a rate of 122 bpm.  QT/QTc 304/443.  QRS 66.  Normal axis and intervals.  Left anterior fascicular block seen on previous imaging.  No STEMI.  Compared to ECG obtained on March 16, 2024. [HK]   1729 Normal perfusion exam.  2+ radial and DP pulses.  Good cap refill.  Skin is warm and pink. [HK]      ED Course User Index  [HK] Karissa Reilly PA-C         Diagnoses as of 07/09/25 2215   Fever, unspecified fever cause   Shaking chills                 No data recorded     [place] Coma Scale Score: 15 (07/09/25 1609 : Julianne Womack, PENG)                           Medical Decision Making  Patient is a 69-year-old female with a past medical history of hypertension, anemia, Khan's esophagus, uterine cancer with lung mets s/p radiation, on chemo currently last chemo session 2 weeks ago who presents to the ED for evaluation of shaking and fevers.  On exam patient is chronically ill-appearing and in no acute distress.  Vital signs are stable.  No fever on arrival.  Cardiopulmonary exam largely unremarkable.  ENT exam with resolving otitis media.  Neurologic exam without deficits.  NIH 0.  Differential includes but is not limited to sepsis, pneumonia, anemia, electrolyte abnormality.  Low suspicion for acute intracranial abnormality or encephalopathy given normal neurologic exam and reassuring lab work. Patient staffed with ED attending physician.    CBC without leukocytosis or anemia.  CMP with sodium 130, chloride 94.  Normal renal function.  Patient treated with 1 L LR, cefepime 2 g and dextrose.  Patient met sepsis criteria on arrival so blood cultures were drawn and workup was initiated.  Lactate 1.5.  Troponin 7 with a nonischemic ECG.  Discussed case with  of GynOnc at [facility] who recommends observation admission.  No indication  for transfer at this time.  Patient was offered admission and would like to proceed given recurrent symptoms.  At this time patient be admitted to general medicine for further evaluation and management.    Procedure  Procedures         [1]   Past Medical History:  Diagnosis Date    Abnormal ECG     hx of    Anxiety 10/2024    Arthritis     Some in spine    Khan esophagus     Breast cancer 2007    chemo/radiation right sided lumpectomy    Dysfunctional uterine bleeding     Essential (primary) hypertension 2022    Benign essential hypertension    GERD (gastroesophageal reflux disease)     Hx antineoplastic chemo     Hyperlipidemia     Osteopenia     Other seasonal allergic rhinitis     Seasonal allergies    Personal history of irradiation     Personal history of malignant neoplasm of breast     History of malignant neoplasm of breast    Personal history of other diseases of the circulatory system     History of hypertension    Personal history of other diseases of the digestive system     History of gastrointestinal disorder   [2]   Past Surgical History:  Procedure Laterality Date    BREAST BIOPSY      BREAST LUMPECTOMY       SECTION, LOW TRANSVERSE  83, 84, 91    HYSTERECTOMY  2023    LUMBAR PERITONEAL SHUNT      OTHER SURGICAL HISTORY  2022     section    OTHER SURGICAL HISTORY  2022    Tonsillectomy    OTHER SURGICAL HISTORY  2022    Lumpectomy    SKIN CANCER EXCISION      vulva    TONSILLECTOMY  1960    TUBAL LIGATION     [3]   Family History  Problem Relation Name Age of Onset    Osteoporosis Mother      Thyroid disease Mother      Hypertension Father      Breast cancer Sibling      Breast cancer Daughter      Stroke Other Grandparent     Hypertension Other Grandparent     Thyroid disease Mother Mine Dannemiller     Arthritis Mother Mnie Dannemiller     Osteoporosis Mother Mine Dannemiller     Cancer Sister Sonia Spicer     Breast cancer Sister Sonia  Dannielle     Hypertension Father Juan Antonio Arredondo     Cancer Sister Sonia Spicer     Arthritis Mother Mine Lazo     Cancer Sister Sonia Spicer     Breast cancer Sister Sonia Spicer     BRCA1 Negative Sister Sonia Spicer     Hypertension Father Juan Antonio Arredondo     Breast cancer Daughter Janet Vanessa     BRCA1 Negative Daughter Janet Vanessa     Cancer Sister Sonia Spicer     Cancer Sister Sonia Spicer    [4]   Social History  Tobacco Use    Smoking status: Former     Current packs/day: 0.00     Average packs/day: 0.5 packs/day for 32.0 years (16.0 ttl pk-yrs)     Types: Cigarettes     Start date: 1975     Quit date: 2007     Years since quittin.0    Smokeless tobacco: Never    Tobacco comments:     Used infrequent since    Vaping Use    Vaping status: Former   Substance Use Topics    Alcohol use: Never    Drug use: Not Currently     Types: Marijuana        Karissa Reilly PA-C  [phone]

## 2025-07-09 NOTE — TELEPHONE ENCOUNTER
Patient called and left VM that she has some questions for Norm, our GYN/onc pharmacist. I forwarded a message to him, then called her back and left a VM that the message has been passed on and to please call back with any nursing concerns that I can help her with.  
2 = assistive person

## 2025-07-10 ENCOUNTER — APPOINTMENT (OUTPATIENT)
Dept: RADIOLOGY | Facility: HOSPITAL | Age: 70
DRG: 153 | End: 2025-07-10
Payer: MEDICARE

## 2025-07-10 LAB
ALBUMIN SERPL BCP-MCNC: 3.8 G/DL (ref 3.4–5)
ANION GAP SERPL CALC-SCNC: 14 MMOL/L (ref 10–20)
APPEARANCE UR: CLEAR
BASOPHILS # BLD AUTO: 0.03 X10*3/UL (ref 0–0.1)
BASOPHILS NFR BLD AUTO: 0.7 %
BILIRUB UR STRIP.AUTO-MCNC: NEGATIVE MG/DL
BUN SERPL-MCNC: 16 MG/DL (ref 6–23)
CALCIUM SERPL-MCNC: 8.3 MG/DL (ref 8.6–10.3)
CHLORIDE SERPL-SCNC: 99 MMOL/L (ref 98–107)
CO2 SERPL-SCNC: 20 MMOL/L (ref 21–32)
COLOR UR: YELLOW
CREAT SERPL-MCNC: 0.42 MG/DL (ref 0.5–1.05)
EGFRCR SERPLBLD CKD-EPI 2021: >90 ML/MIN/1.73M*2
EOSINOPHIL # BLD AUTO: 0 X10*3/UL (ref 0–0.7)
EOSINOPHIL NFR BLD AUTO: 0 %
ERYTHROCYTE [DISTWIDTH] IN BLOOD BY AUTOMATED COUNT: 13.2 % (ref 11.5–14.5)
GLUCOSE SERPL-MCNC: 83 MG/DL (ref 74–99)
GLUCOSE UR STRIP.AUTO-MCNC: NORMAL MG/DL
HCT VFR BLD AUTO: 31.5 % (ref 36–46)
HGB BLD-MCNC: 10.4 G/DL (ref 12–16)
HOLD SPECIMEN: NORMAL
IMM GRANULOCYTES # BLD AUTO: 0.03 X10*3/UL (ref 0–0.7)
IMM GRANULOCYTES NFR BLD AUTO: 0.7 % (ref 0–0.9)
KETONES UR STRIP.AUTO-MCNC: ABNORMAL MG/DL
LEUKOCYTE ESTERASE UR QL STRIP.AUTO: NEGATIVE
LYMPHOCYTES # BLD AUTO: 0.86 X10*3/UL (ref 1.2–4.8)
LYMPHOCYTES NFR BLD AUTO: 18.7 %
MAGNESIUM SERPL-MCNC: 1.66 MG/DL (ref 1.6–2.4)
MCH RBC QN AUTO: 31.7 PG (ref 26–34)
MCHC RBC AUTO-ENTMCNC: 33 G/DL (ref 32–36)
MCV RBC AUTO: 96 FL (ref 80–100)
MONOCYTES # BLD AUTO: 0.62 X10*3/UL (ref 0.1–1)
MONOCYTES NFR BLD AUTO: 13.5 %
MUCOUS THREADS #/AREA URNS AUTO: ABNORMAL /LPF
NEUTROPHILS # BLD AUTO: 3.05 X10*3/UL (ref 1.2–7.7)
NEUTROPHILS NFR BLD AUTO: 66.4 %
NITRITE UR QL STRIP.AUTO: NEGATIVE
NRBC BLD-RTO: 0 /100 WBCS (ref 0–0)
PH UR STRIP.AUTO: 5.5 [PH]
PHOSPHATE SERPL-MCNC: 3.1 MG/DL (ref 2.5–4.9)
PLATELET # BLD AUTO: 232 X10*3/UL (ref 150–450)
POTASSIUM SERPL-SCNC: 3.4 MMOL/L (ref 3.5–5.3)
PROT UR STRIP.AUTO-MCNC: ABNORMAL MG/DL
RBC # BLD AUTO: 3.28 X10*6/UL (ref 4–5.2)
RBC # UR STRIP.AUTO: ABNORMAL MG/DL
RBC #/AREA URNS AUTO: >20 /HPF
SODIUM SERPL-SCNC: 130 MMOL/L (ref 136–145)
SP GR UR STRIP.AUTO: 1.02
UROBILINOGEN UR STRIP.AUTO-MCNC: NORMAL MG/DL
WBC # BLD AUTO: 4.6 X10*3/UL (ref 4.4–11.3)
WBC #/AREA URNS AUTO: ABNORMAL /HPF

## 2025-07-10 PROCEDURE — 74177 CT ABD & PELVIS W/CONTRAST: CPT | Performed by: RADIOLOGY

## 2025-07-10 PROCEDURE — 83735 ASSAY OF MAGNESIUM: CPT

## 2025-07-10 PROCEDURE — 70450 CT HEAD/BRAIN W/O DYE: CPT

## 2025-07-10 PROCEDURE — 85025 COMPLETE CBC W/AUTO DIFF WBC: CPT

## 2025-07-10 PROCEDURE — 2500000004 HC RX 250 GENERAL PHARMACY W/ HCPCS (ALT 636 FOR OP/ED)

## 2025-07-10 PROCEDURE — 2500000002 HC RX 250 W HCPCS SELF ADMINISTERED DRUGS (ALT 637 FOR MEDICARE OP, ALT 636 FOR OP/ED)

## 2025-07-10 PROCEDURE — 1100000001 HC PRIVATE ROOM DAILY

## 2025-07-10 PROCEDURE — 2500000001 HC RX 250 WO HCPCS SELF ADMINISTERED DRUGS (ALT 637 FOR MEDICARE OP)

## 2025-07-10 PROCEDURE — 36415 COLL VENOUS BLD VENIPUNCTURE: CPT

## 2025-07-10 PROCEDURE — 80069 RENAL FUNCTION PANEL: CPT

## 2025-07-10 PROCEDURE — 71260 CT THORAX DX C+: CPT | Performed by: RADIOLOGY

## 2025-07-10 PROCEDURE — 74177 CT ABD & PELVIS W/CONTRAST: CPT

## 2025-07-10 PROCEDURE — 70450 CT HEAD/BRAIN W/O DYE: CPT | Performed by: RADIOLOGY

## 2025-07-10 PROCEDURE — 81001 URINALYSIS AUTO W/SCOPE: CPT | Performed by: STUDENT IN AN ORGANIZED HEALTH CARE EDUCATION/TRAINING PROGRAM

## 2025-07-10 PROCEDURE — 2550000001 HC RX 255 CONTRASTS: Performed by: STUDENT IN AN ORGANIZED HEALTH CARE EDUCATION/TRAINING PROGRAM

## 2025-07-10 PROCEDURE — 2500000001 HC RX 250 WO HCPCS SELF ADMINISTERED DRUGS (ALT 637 FOR MEDICARE OP): Performed by: STUDENT IN AN ORGANIZED HEALTH CARE EDUCATION/TRAINING PROGRAM

## 2025-07-10 PROCEDURE — 2500000005 HC RX 250 GENERAL PHARMACY W/O HCPCS

## 2025-07-10 PROCEDURE — 99233 SBSQ HOSP IP/OBS HIGH 50: CPT

## 2025-07-10 RX ORDER — MAGNESIUM CHLORIDE 64 MG
64 TABLET, DELAYED RELEASE (ENTERIC COATED) ORAL ONCE
Status: COMPLETED | OUTPATIENT
Start: 2025-07-10 | End: 2025-07-10

## 2025-07-10 RX ORDER — VANCOMYCIN HYDROCHLORIDE 1.25 G/250ML
1250 INJECTION, SOLUTION INTRAVITREAL EVERY 12 HOURS
Status: DISCONTINUED | OUTPATIENT
Start: 2025-07-10 | End: 2025-07-11

## 2025-07-10 RX ORDER — ACETAMINOPHEN 325 MG/1
975 TABLET ORAL EVERY 6 HOURS PRN
Status: DISCONTINUED | OUTPATIENT
Start: 2025-07-10 | End: 2025-07-12 | Stop reason: HOSPADM

## 2025-07-10 RX ORDER — ONDANSETRON HYDROCHLORIDE 2 MG/ML
4 INJECTION, SOLUTION INTRAVENOUS EVERY 6 HOURS PRN
Status: DISCONTINUED | OUTPATIENT
Start: 2025-07-10 | End: 2025-07-12 | Stop reason: HOSPADM

## 2025-07-10 RX ORDER — OXYCODONE HYDROCHLORIDE 5 MG/1
5 TABLET ORAL ONCE
Refills: 0 | Status: COMPLETED | OUTPATIENT
Start: 2025-07-10 | End: 2025-07-10

## 2025-07-10 RX ORDER — ACETAMINOPHEN 325 MG/1
650 TABLET ORAL ONCE
Status: COMPLETED | OUTPATIENT
Start: 2025-07-10 | End: 2025-07-10

## 2025-07-10 RX ORDER — POTASSIUM CHLORIDE 20 MEQ/1
40 TABLET, EXTENDED RELEASE ORAL ONCE
Status: COMPLETED | OUTPATIENT
Start: 2025-07-10 | End: 2025-07-10

## 2025-07-10 RX ORDER — PROPRANOLOL HYDROCHLORIDE 20 MG/1
10 TABLET ORAL 2 TIMES DAILY PRN
Status: DISCONTINUED | OUTPATIENT
Start: 2025-07-10 | End: 2025-07-12 | Stop reason: HOSPADM

## 2025-07-10 RX ORDER — ACETAMINOPHEN 500 MG
10 TABLET ORAL ONCE
Status: COMPLETED | OUTPATIENT
Start: 2025-07-10 | End: 2025-07-10

## 2025-07-10 RX ORDER — ACETAMINOPHEN 325 MG/1
650 TABLET ORAL EVERY 6 HOURS PRN
Status: DISCONTINUED | OUTPATIENT
Start: 2025-07-10 | End: 2025-07-10

## 2025-07-10 RX ORDER — SODIUM CHLORIDE, SODIUM LACTATE, POTASSIUM CHLORIDE, CALCIUM CHLORIDE 600; 310; 30; 20 MG/100ML; MG/100ML; MG/100ML; MG/100ML
75 INJECTION, SOLUTION INTRAVENOUS CONTINUOUS
Status: ACTIVE | OUTPATIENT
Start: 2025-07-10 | End: 2025-07-11

## 2025-07-10 RX ORDER — PROCHLORPERAZINE EDISYLATE 5 MG/ML
10 INJECTION INTRAMUSCULAR; INTRAVENOUS EVERY 6 HOURS PRN
Status: DISCONTINUED | OUTPATIENT
Start: 2025-07-10 | End: 2025-07-12 | Stop reason: HOSPADM

## 2025-07-10 RX ORDER — LIDOCAINE 560 MG/1
1 PATCH PERCUTANEOUS; TOPICAL; TRANSDERMAL DAILY
Status: DISCONTINUED | OUTPATIENT
Start: 2025-07-10 | End: 2025-07-12 | Stop reason: HOSPADM

## 2025-07-10 RX ORDER — VANCOMYCIN HYDROCHLORIDE 1 G/20ML
INJECTION, POWDER, LYOPHILIZED, FOR SOLUTION INTRAVENOUS DAILY PRN
Status: DISCONTINUED | OUTPATIENT
Start: 2025-07-10 | End: 2025-07-11

## 2025-07-10 RX ORDER — OXYCODONE HYDROCHLORIDE 5 MG/1
5 TABLET ORAL EVERY 4 HOURS PRN
Refills: 0 | Status: DISCONTINUED | OUTPATIENT
Start: 2025-07-10 | End: 2025-07-12 | Stop reason: HOSPADM

## 2025-07-10 RX ADMIN — OXYCODONE HYDROCHLORIDE 5 MG: 5 TABLET ORAL at 14:09

## 2025-07-10 RX ADMIN — CEFEPIME HYDROCHLORIDE 2 G: 2 INJECTION, SOLUTION INTRAVENOUS at 08:41

## 2025-07-10 RX ADMIN — MAGNESIUM 64 MG (MAGNESIUM CHLORIDE) TABLET,DELAYED RELEASE 1 TABLET: at 11:46

## 2025-07-10 RX ADMIN — VANCOMYCIN HYDROCHLORIDE 1250 MG: 1.25 INJECTION, SOLUTION INTRAVITREAL at 11:42

## 2025-07-10 RX ADMIN — ACETAMINOPHEN 650 MG: 325 TABLET ORAL at 08:51

## 2025-07-10 RX ADMIN — LISINOPRIL 20 MG: 20 TABLET ORAL at 06:34

## 2025-07-10 RX ADMIN — Medication 10 MG: at 20:49

## 2025-07-10 RX ADMIN — ESCITALOPRAM OXALATE 10 MG: 10 TABLET ORAL at 08:42

## 2025-07-10 RX ADMIN — OXYCODONE HYDROCHLORIDE 5 MG: 5 TABLET ORAL at 20:49

## 2025-07-10 RX ADMIN — IOHEXOL 75 ML: 350 INJECTION, SOLUTION INTRAVENOUS at 09:43

## 2025-07-10 RX ADMIN — LIDOCAINE 4% 1 PATCH: 40 PATCH TOPICAL at 13:58

## 2025-07-10 RX ADMIN — CEFEPIME HYDROCHLORIDE 2 G: 2 INJECTION, SOLUTION INTRAVENOUS at 02:50

## 2025-07-10 RX ADMIN — PROPRANOLOL HYDROCHLORIDE 10 MG: 20 TABLET ORAL at 08:42

## 2025-07-10 RX ADMIN — POTASSIUM CHLORIDE 40 MEQ: 1500 TABLET, EXTENDED RELEASE ORAL at 08:51

## 2025-07-10 RX ADMIN — SODIUM CHLORIDE, SODIUM LACTATE, POTASSIUM CHLORIDE, AND CALCIUM CHLORIDE 75 ML/HR: .6; .31; .03; .02 INJECTION, SOLUTION INTRAVENOUS at 08:54

## 2025-07-10 RX ADMIN — ENOXAPARIN SODIUM 40 MG: 100 INJECTION SUBCUTANEOUS at 20:49

## 2025-07-10 RX ADMIN — CEFEPIME HYDROCHLORIDE 2 G: 2 INJECTION, SOLUTION INTRAVENOUS at 16:47

## 2025-07-10 RX ADMIN — ACETAMINOPHEN 650 MG: 325 TABLET ORAL at 04:43

## 2025-07-10 ASSESSMENT — COGNITIVE AND FUNCTIONAL STATUS - GENERAL
DAILY ACTIVITIY SCORE: 18
HELP NEEDED FOR BATHING: A LITTLE
EATING MEALS: A LITTLE
PERSONAL GROOMING: A LITTLE
PERSONAL GROOMING: A LITTLE
MOBILITY SCORE: 20
TOILETING: A LITTLE
HELP NEEDED FOR BATHING: A LITTLE
DAILY ACTIVITIY SCORE: 18
DRESSING REGULAR UPPER BODY CLOTHING: A LITTLE
MOVING TO AND FROM BED TO CHAIR: A LITTLE
TOILETING: A LITTLE
STANDING UP FROM CHAIR USING ARMS: A LITTLE
MOBILITY SCORE: 20
CLIMB 3 TO 5 STEPS WITH RAILING: A LITTLE
DRESSING REGULAR LOWER BODY CLOTHING: A LITTLE
STANDING UP FROM CHAIR USING ARMS: A LITTLE
MOVING TO AND FROM BED TO CHAIR: A LITTLE
CLIMB 3 TO 5 STEPS WITH RAILING: A LITTLE
DRESSING REGULAR LOWER BODY CLOTHING: A LITTLE
WALKING IN HOSPITAL ROOM: A LITTLE
WALKING IN HOSPITAL ROOM: A LITTLE
DRESSING REGULAR UPPER BODY CLOTHING: A LITTLE
EATING MEALS: A LITTLE

## 2025-07-10 ASSESSMENT — PAIN DESCRIPTION - LOCATION
LOCATION: BACK

## 2025-07-10 ASSESSMENT — PAIN SCALES - GENERAL
PAINLEVEL_OUTOF10: 3
PAINLEVEL_OUTOF10: 2
PAINLEVEL_OUTOF10: 8
PAINLEVEL_OUTOF10: 7

## 2025-07-10 ASSESSMENT — PAIN - FUNCTIONAL ASSESSMENT
PAIN_FUNCTIONAL_ASSESSMENT: 0-10
PAIN_FUNCTIONAL_ASSESSMENT: 0-10
PAIN_FUNCTIONAL_ASSESSMENT: UNABLE TO SELF-REPORT
PAIN_FUNCTIONAL_ASSESSMENT: 0-10

## 2025-07-10 ASSESSMENT — PAIN DESCRIPTION - ORIENTATION
ORIENTATION: MID
ORIENTATION: MID

## 2025-07-10 ASSESSMENT — PAIN DESCRIPTION - DESCRIPTORS
DESCRIPTORS: ACHING

## 2025-07-10 ASSESSMENT — PAIN SCALES - WONG BAKER: WONGBAKER_NUMERICALRESPONSE: HURTS LITTLE BIT

## 2025-07-10 ASSESSMENT — ACTIVITIES OF DAILY LIVING (ADL): LACK_OF_TRANSPORTATION: NO

## 2025-07-10 NOTE — PROGRESS NOTES
"Nutrition Initial Assessment:   Nutrition Assessment    Reason for Assessment: Admission nursing screening (MST=1 \"on chemo; poor appetite\")    Patient is a 69 y.o. female presenting with fever and chills. Patient currently on chemotherapy supposed to start her last cycle on next Monday.     PMHx of hypertension, hyperlipidemia, Khan's esophagus, breast cancer 2007, uterine cancers/p uterine removal 2023 with lung mets s/p radiation, on chemotherapy now last session 2 weeks ago     Nutrition History:  Energy Intake: Poor < 50 %  Pain affecting nutrition status: N/A  Food and Nutrient History: Pt resting in bed and not feeling well. She has trouble forming the words to tell me she ate a bite of eggs for breakfast. She explains that she has little to no appetite and really nothing sounds good to her. She has drank oral nutrition supplements before, but she cannot recall which ones or if she would like them again. Chart shows no significant weight loss and based on conversations with other disciplines, it appears the decrease in appetite is recent.  Vitamin/Herbal Supplement Use: None in chart.  Food Intolerance:  (Pt cannot recall any.)       Anthropometrics:  Height: 157.5 cm (5' 2\")   Weight: 63.9 kg (140 lb 14.4 oz)   BMI (Calculated): 25.76  IBW/kg (Dietitian Calculated): 49.9 kg  Percent of IBW: 128.09 %                      Weight History:   Wt Readings from Last 20 Encounters:   07/09/25 63.9 kg (140 lb 14.4 oz)   07/06/25 61.7 kg (136 lb)   06/30/25 62 kg (136 lb 9.6 oz)   06/23/25 62.8 kg (138 lb 7.2 oz)   06/02/25 62.3 kg (137 lb 5.6 oz)   05/12/25 62.3 kg (137 lb 5.6 oz)   05/07/25 62.8 kg (138 lb 8 oz)   04/21/25 62.1 kg (136 lb 14.5 oz)   03/31/25 59.4 kg (130 lb 15.3 oz)   03/17/25 60.2 kg (132 lb 11.5 oz)   03/04/25 59.9 kg (132 lb)   02/25/25 61.4 kg (135 lb 6.4 oz)   02/17/25 61.3 kg (135 lb 3.2 oz)   02/10/25 62.1 kg (136 lb 14.5 oz)   02/07/25 61.7 kg (136 lb)   10/23/24 60.5 kg (133 lb 4.8 oz) "   10/21/24 59.8 kg (131 lb 13.4 oz)   10/15/24 61 kg (134 lb 7.7 oz)   08/12/24 61.4 kg (135 lb 5.8 oz)   07/31/24 61.4 kg (135 lb 5.8 oz)       Weight Change %:  Significant Weight Loss: No    Nutrition Focused Physical Exam Findings:    Subcutaneous Fat Loss:   Defer Subcutaneous Fat Loss Assessment: Defer all  Defer All Reason: Pt not interested at this time.  Muscle Wasting:     Edema:     Physical Findings:  Skin: Positive (Pale)  Digestive System Findings: Anorexia    Nutrition Significant Labs:  CBC Trend:   Results from last 7 days   Lab Units 07/10/25  0600 07/09/25  1647 07/06/25  0916   WBC AUTO x10*3/uL 4.6 7.4 8.6   RBC AUTO x10*6/uL 3.28* 3.92* 3.72*   HEMOGLOBIN g/dL 10.4* 12.6 12.0   HEMATOCRIT % 31.5* 36.9 35.7*   MCV fL 96 94 96   PLATELETS AUTO x10*3/uL 232 305 175    , BMP Trend:   Results from last 7 days   Lab Units 07/10/25  0600 07/09/25 1647 07/06/25  0916   GLUCOSE mg/dL 83 96 92   CALCIUM mg/dL 8.3* 9.4 9.1   SODIUM mmol/L 130* 130* 134*   POTASSIUM mmol/L 3.4* 3.7 3.6   CO2 mmol/L 20* 24 28   CHLORIDE mmol/L 99 94* 98   BUN mg/dL 16 19 14   CREATININE mg/dL 0.42* 0.65 0.52        Nutrition Specific Medications:  Scheduled medications  Scheduled Medications[1]  Continuous medications  Continuous Medications[2]        I/O:    ;      Dietary Orders (From admission, onward)       Start     Ordered    07/09/25 2230  May Participate in Room Service  ( ROOM SERVICE MAY PARTICIPATE)  Once        Question:  .  Answer:  Yes    07/09/25 2229 07/09/25 2045  Adult diet Regular  Diet effective now        Question:  Diet type  Answer:  Regular    07/09/25 2044                     Estimated Needs:      Method for Estimating Needs: 9041-0529 kcal (25-30 kcal/kg)     Method for Estimating 24 Hour Protein Needs: 64-77g protein (1.0-1.2g/kg)     Method for Estimating 24 Hour Fluid Needs: 1600-1900mL (1ml/kcal)        Nutrition Diagnosis   Malnutrition Diagnosis  Patient has Malnutrition Diagnosis:  "No    Nutrition Diagnosis  Patient has Nutrition Diagnosis: Yes  Diagnosis Status (1): New  Nutrition Diagnosis 1: Altered GI function  Related to (1): anorexia  As Evidenced by (1): pt complains of loss of appetite and disinterest in eating with oral intakes of only bites at breakfast today.       Nutrition Interventions/Recommendations   Nutrition prescription for oral nutrition    Nutrition Recommendations:  Individualized Nutrition Prescription Provided for : Continue REGULAR diet as ordered. Offer ENSURE PLUS HP as requested to supplement intakes.    Nutrition Interventions/Goals:   Medical Food Supplement: Commercial beverage medical food supplement therapy  Goal: ENSURE PLUS HP = 350kcal/20g protein each 8-ounce      Education Documentation  Nutrition Related Education, taught by Dyana Mendenhall RDN, LETITIA at 7/10/2025 11:28 AM.  Learner: Patient  Readiness: Nonacceptance  Method: Handout  Response: No Evidence of Learning  Comment: Provided handouts from the Nutrition Oncology Resources: \"Poor Appetite\" and \"High Calorie, High Protein Diet\" with RD name and number.              Nutrition Monitoring and Evaluation   Intake / Amount of food: Consumes at least 50% or more of meals/snacks/supplements    Body Weight: Body weight - Maintain stable weight    Electrolyte and Renal Panel: Sodium  Criteria: Hyponatremia to be corrected.    Digestive System Finding: Anorexia  Criteria: to improve.    Goal Status: New goal(s) identified    Time Spent (min): 30 minutes     Nutrition Progress Note         [1] cefepime, 2 g, intravenous, q8h  enoxaparin, 40 mg, subcutaneous, q24h  escitalopram, 10 mg, oral, Daily  lisinopril, 20 mg, oral, Daily before breakfast  magnesium chloride, 64 mg of elemental magnesium, oral, Once  vancomycin, 1,250 mg, intravenous, q12h     [2] lactated Ringer's, 75 mL/hr, Last Rate: 75 mL/hr (07/10/25 1045)       "

## 2025-07-10 NOTE — PROGRESS NOTES
07/10/25 1525   Discharge Planning   Living Arrangements Alone   Support Systems Spouse/significant other;Children   Assistance Needed yes, in the past couple of days   Type of Residence Private residence   Expected Discharge Disposition Home   Does the patient need discharge transport arranged? No   Financial Resource Strain   How hard is it for you to pay for the very basics like food, housing, medical care, and heating? Not very   Housing Stability   In the last 12 months, was there a time when you were not able to pay the mortgage or rent on time? N   Transportation Needs   In the past 12 months, has lack of transportation kept you from medical appointments or from getting medications? no   In the past 12 months, has lack of transportation kept you from meetings, work, or from getting things needed for daily living? No   Stroke Family Assessment   Stroke Family Assessment Needed No   Intensity of Service   Intensity of Service 0-30 min     Met with the patient in the room, she states that she is  from her  but he has been helping her the past few days prior to admission. She also has 2 sons that live locally that have helped her. She is concerned as she has not been feeling well in the past days. Discharge needs will be pending progress. Discussed healthy at home program, she will look over flyer. Noted that she is ordered therapies, will await evaluation.

## 2025-07-10 NOTE — CARE PLAN
The patient's goals for the shift include getting rest     The clinical goals for the shift include pt remaining afebrile through the end of the shift.    Over the shift, the patient did make progress on these goals.  Pt remained hemodynamically stable through the night.  No acute events overnight.  Call light within reach. Bed alarm on.

## 2025-07-10 NOTE — PROGRESS NOTES
Vancomycin Dosing by Pharmacy- INITIAL    Mica Vanessa is a 69 y.o. year old female who Pharmacy has been consulted for vancomycin dosing for cellulitis, skin and soft tissue. Based on the patient's indication and renal status this patient will be dosed based on a goal AUC of 400-600.     Renal function is currently stable.    Visit Vitals  /62 (BP Location: Right arm, Patient Position: Lying)   Pulse 72   Temp 36.1 °C (97 °F) (Temporal)   Resp 16        Lab Results   Component Value Date    CREATININE 0.42 (L) 07/10/2025    CREATININE 0.65 2025    CREATININE 0.52 2025    CREATININE 0.59 2025        Patient weight is as follows:   Vitals:    25   Weight: 63.9 kg (140 lb 14.4 oz)       Cultures:  No results found for the encounter in last 14 days.        I/O last 3 completed shifts:  In: 1050 (16.4 mL/kg) [IV Piggyback:1050]  Out: 150 (2.3 mL/kg) [Urine:150 (0.1 mL/kg/hr)]  Weight: 63.9 kg   I/O during current shift:  I/O this shift:  In: 765 [P.O.:120; I.V.:295; IV Piggyback:350]  Out: -     Temp (24hrs), Av.2 °C (98.9 °F), Min:36.1 °C (97 °F), Max:38.8 °C (101.8 °F)         Assessment/Plan     Patient will not be given a loading dose.  Will initiate vancomycin maintenance, 1250 mg every 12 hours.    This dosing regimen is predicted by [facility] to result in the following pharmacokinetic parameters:  Loading dose: N/A  Regimen: 1250 mg IV every 12 hours.  Start time: 23:42 on 07/10/2025  Exposure target: AUC24 (range) 400-600 mg/L.hr   [id number]: 495 mg/L.hr  AUC24,ss: 537 mg/L.hr  Probability of AUC24 > 400: 78 %  Ctrough,ss: 15.2 mg/L  Probability of Ctrough,ss > 20: 30 %    Follow-up level will be ordered on  at 0500 unless clinically indicated sooner.  Will continue to monitor renal function daily while on vancomycin and order serum creatinine at least every 48 hours if not already ordered.  Follow for continued vancomycin needs, clinical response, and signs/symptoms of  toxicity.       Renetta Doll, PharmD

## 2025-07-10 NOTE — CARE PLAN
The patient's goals for the shift include    Problem: Pain - Adult  Goal: Verbalizes/displays adequate comfort level or baseline comfort level  Outcome: Progressing     Problem: Safety - Adult  Goal: Free from fall injury  Outcome: Progressing     Problem: Discharge Planning  Goal: Discharge to home or other facility with appropriate resources  Outcome: Progressing     Problem: Chronic Conditions and Co-morbidities  Goal: Patient's chronic conditions and co-morbidity symptoms are monitored and maintained or improved  Outcome: Progressing     Problem: Nutrition  Goal: Nutrient intake appropriate for maintaining nutritional needs  Outcome: Progressing     Problem: Fall/Injury  Goal: Not fall by end of shift  Outcome: Progressing  Goal: Be free from injury by end of the shift  Outcome: Progressing  Goal: Verbalize understanding of personal risk factors for fall in the hospital  Outcome: Progressing  Goal: Verbalize understanding of risk factor reduction measures to prevent injury from fall in the home  Outcome: Progressing  Goal: Use assistive devices by end of the shift  Outcome: Progressing  Goal: Pace activities to prevent fatigue by end of the shift  Outcome: Progressing       The clinical goals for the shift include pt will remain afebrile this shift    Patient stable at this time, VS stable. Patient afebrile at this time, receiving IV antibiotics.

## 2025-07-10 NOTE — PROGRESS NOTES
"ID:  Mica Vanessa is a 69 y.o. female on hospital day 1 of admission presenting with Fever, unspecified fever cause.    Subjective   The patient seen and examined this morning at bedside. No overnight events.  when I initially walked in the room independently pt was A&o x 2, complaining of fatigue, lower appetite, stuffy ears, chills.  when rounding with  the attending patient appeared more confused (forgot her birthday, daughter's name, states she lives in).Patient denied HA, dizziness, abdominal pain,  chest pain, N/V, Dysuria, and diarrhea.         Objective     Visit Vitals  /62 (BP Location: Right arm, Patient Position: Lying)   Pulse 72   Temp 36.1 °C (97 °F) (Temporal)   Resp 16   Ht 1.575 m (5' 2\")   Wt 63.9 kg (140 lb 14.4 oz)   SpO2 96%   BMI 25.77 kg/m²   OB Status Postmenopausal   Smoking Status Former   BSA 1.67 m²        Physical Examination:  General: Appears stated age, thin and frail, A&Ox1, conversational, lying in bed, HEENT: Mucous membranes dry.  Head/Neck: Atraumatic, Normocephalic. Neck supple.   Respiratory: equal air entry bilaterally no crackles or wheezing were appreciated.  Cardiovascular: regular rhythm, normal S1 and S2. No added sounds or murmurs,  Gastrointestinal: soft, non-tender abdomen, bowel sounds present, no guarding or rebound.   Extremities: No edema in lower extremities.  Neurological:  no focal neurologic deficits.  Psychological: Appropriate mood, normal affect.      Laboratory Data:    Results from last 7 days   Lab Units 07/10/25  0600 07/09/25  1647 07/06/25  0916   WBC AUTO x10*3/uL 4.6 7.4 8.6   RBC AUTO x10*6/uL 3.28* 3.92* 3.72*   HEMOGLOBIN g/dL 10.4* 12.6 12.0     Results from last 7 days   Lab Units 07/10/25  0600 07/09/25  1647 07/06/25  0916   SODIUM mmol/L 130* 130* 134*   POTASSIUM mmol/L 3.4* 3.7 3.6   CHLORIDE mmol/L 99 94* 98   CO2 mmol/L 20* 24 28   BUN mg/dL 16 19 14   CREATININE mg/dL 0.42* 0.65 0.52   CALCIUM mg/dL 8.3* 9.4 9.1   PHOSPHORUS " mg/dL 3.1  --   --    MAGNESIUM mg/dL 1.66  --   --    BILIRUBIN TOTAL mg/dL  --  0.7 0.6   ALT U/L  --  20 20   AST U/L  --  15 15       Imaging:  Imaging  CT head wo IV contrast  Result Date: 7/10/2025  No acute intracranial pathology.   Left frontal/ethmoid/maxillary sinusitis.   Signed by: Fernando Capps 7/10/2025 10:14 AM Dictation workstation:   [id number]    CT chest abdomen pelvis w IV contrast  Result Date: 7/10/2025  CHEST: 1.  No new active disease in the chest. 2. Scattered tiny pulmonary nodules are stable.   ABDOMEN-PELVIS: 1.  No acute findings of the abdomen or pelvis.     Signed by: Fernando Capps 7/10/2025 10:11 AM Dictation workstation:   [id number]    XR chest 1 view  Result Date: 7/9/2025  No acute cardiopulmonary disease. Signed by Juan Diggs      Cardiology, Vascular, and Other Imaging  Electrocardiogram, 12-lead ACS symptoms  Result Date: 7/10/2025  Sinus tachycardia Possible Left atrial enlargement Left anterior fascicular block Anterolateral infarct (cited on or before 16-MAR-2024) Abnormal ECG When compared with ECG of 16-MAR-2024 17:59, Vent. rate has increased BY  64 BPM QRS duration has decreased         Medications:  Scheduled medications  Scheduled Medications[1]  Continuous medications  Continuous Medications[2]  PRN medications  PRN Medications[3]    Assessment    Assessment & Plan   69-year-old female with PMHx of hypertension, hyperlipidemia, Khan's esophagus, breast cancer 2007, uterine cancers/p uterine removal 2023 with lung mets s/p radiation, on chemotherapy now last session 2 weeks ago presenting with fever and chills.  Patient recently diagnosed with ear pain 2 weeks ago, antibiotic switched to cefdinir 4 days ago.  Patient has fever and chills  started 4 days ago, still she has mild ear pain, no drainage.  Patient reports difficulty finding words for a while.  She denies chest pain, shortness of breath, abdominal pain, change in bowel or urinary habits.  Notably patient  has uterine cancer diagnosed in 2023 s/p uterine removal now she found to have lung metastasis on chemotherapy next cycle supposed to be on next Monday, patient follow with Dr. Palacio.  Assessment & Plan  Fever, unspecified fever cause            #Fever in immunocompromise patient  #Recent otitis media  #Hx of uterine cancer s/p removal with lung mets s/p radiation, chemotherapy now  Patient presented with fever and chills, highest temp at home 102, she recently has otitis media treated with antibiotic 2 weeks ago however still has ear pain so antibiotic switched 4 days ago to cefdinir.  Patient on chemotherapy last cycle 2 weeks ago supposed to have next cycle on Monday.  Lab remarkable for mild hyponatremia however CBC normal with normal leukocyte 7.4.  EKG reveals sinus tachycardia, no ischemic changes.  Chest x-ray unremarkable   UA: Unremarkable  - Chest/ Abdomen & pelvis CT: No new active disease in the chest, Scattered tiny pulmonary nodules are stable.  No acute findings of the abdomen or pelvis.   - Head CT ( For possible Mets):No acute intracranial pathology. Left frontal/ethmoid/maxillary sinusitis.  Plan:  - Admit for observation  -Add Vanc, Continue cefepime for ear infection, to cover Pseudomonas in setting of immunocompromise and fever (during admission patient continued to have a spike of 38 degree)  -Blood culture--- pending result  -Temperature trend  -Tylenol as needed for fever(Not to be scheduled)   - mIVF LR 75mL/hr          Global Plan of Care  Fluid: PRN  Electrolytes: PRN  Nutrition: Regular/ NPO / Cardiac / Diabetic / Renal  DVT Prophylaxis: Lovenox/Heparin/ Eliquis/ None  GI Prophylaxis: None  Code Status: Full Code     Emergency Contact: Extended Emergency Contact Information  Primary Emergency Contact: Nicolasa Vanessa  Address: [address] [place] [address] of [place]  Home Phone: [phone]  Work Phone: [phone]  Mobile Phone:  [phone]  Relation: Son   needed? No  Secondary Emergency Contact: Paul Vanessa  Address: [address] [place] [address] [place] [address] [place]  Home Phone: [phone]  Work Phone: [phone]  Mobile Phone: [phone]  Relation: Son   needed? No     Patient seen and discussed with the attending.  Signature: Nils Pérez MD, PGY I Internal medicine  Date: July 10, 2025   This note has been transcribed using Dragon voice recognition system and there is a possibility of unintentional typing misprints.  Any information found to be copied from previous providers is done in the best interest of the patient to provide accurate, quality, and continuity of care.          [1] cefepime, 2 g, intravenous, q8h  enoxaparin, 40 mg, subcutaneous, q24h  escitalopram, 10 mg, oral, Daily  lidocaine, 1 patch, transdermal, Daily  lisinopril, 20 mg, oral, Daily before breakfast  oxyCODONE, 5 mg, oral, Once  vancomycin, 1,250 mg, intravenous, q12h  [2] lactated Ringer's, 75 mL/hr, Last Rate: 75 mL/hr (07/10/25 1250)  [3] PRN medications: acetaminophen, ondansetron, prochlorperazine, propranolol, vancomycin

## 2025-07-10 NOTE — H&P
History Of Present Illness  Mica Vanessa is a 69 y.o. female with PMHx of hypertension, hyperlipidemia, Khan's esophagus, breast cancer 2007, uterine cancers/p uterine removal 2023 with lung mets s/p radiation, on chemotherapy now last session 2 weeks ago presenting with fever and chills.  Patient is good historian she reports she has had ear pain 2 weeks ago treated with antibiotic which is not work she came back to ED on Sunday switch her to cefdinir.  Since last Sunday patient has fever highest temp 102 and chills associated with dizziness.  She reports she still has mild ear pain, she denied seizure, cough, chest pain, shortness of breath, abdominal pain, nausea or vomiting, change in bowel or urinary habits.  Patient reports she started to have difficulty to find words lasting for a while.  Patient currently on chemotherapy supposed to start her last cycle on next Monday.  Patient quit smoking however she is not sure for how long, she denies drinking alcohol.    Of note patient has uterine cancer diagnosed 2023 s/p uterine removal, found to have lung metastasis she is on chemotherapy Pembrolizumab + PACLitaxel / CARBOplatin, 21 Day Cycles followed by Pembrolizumab, 42 Day Cycles - Gyn.  Patient follow with Dr. Palacio    ED course  Patient is afebrile 37.3, tachycardic 112, /61, SO2 97% on room air.  CMP remarkable for hyponatremia 130, hypochloremia 94.  CBC unremarkable with normal leukocyte 7.4.  EKG sinus tachycardia with , QTc 433, no ischemic changes.  Chest x-ray unremarkable.  Intervention: Patient received cefepime, blood culture obtained, ED provider spoke with oncology who recommended admission for observation.    Past Medical History  She has a past medical history of Abnormal ECG, Anxiety (10/2024), Arthritis (2022), Khan esophagus, Breast cancer (2007), Dysfunctional uterine bleeding, Essential (primary) hypertension (09/08/2022), GERD (gastroesophageal reflux disease),  antineoplastic chemo, Hyperlipidemia, Osteopenia, Other seasonal allergic rhinitis, Personal history of irradiation, Personal history of malignant neoplasm of breast, Personal history of other diseases of the circulatory system, and Personal history of other diseases of the digestive system.    Surgical History  She has a past surgical history that includes Other surgical history (2022); Other surgical history (2022); Other surgical history (2022); Lumbar peritoneal shunt; Tubal ligation (); Skin cancer excision (); Breast lumpectomy; Breast biopsy;  section, low transverse (83, 84, 91); Hysterectomy (2023); and Tonsillectomy ().     Social History  She reports that she quit smoking about 18 years ago. Her smoking use included cigarettes. She started smoking about 50 years ago. She has a 16 pack-year smoking history. She has never used smokeless tobacco. She reports that she does not currently use drugs after having used the following drugs: Marijuana. She reports that she does not drink alcohol.    Family History  Family History[1]     Allergies  Patient has no known allergies.    Review of Systems  Review of system was negative apart as noted in H&P  Physical Exam  Constitutional:       Appearance: Normal appearance.   HENT:      Head: Normocephalic and atraumatic.      Mouth/Throat:      Mouth: Mucous membranes are moist.   Cardiovascular:      Rate and Rhythm: Tachycardia present.      Heart sounds: No murmur heard.  Pulmonary:      Breath sounds: No wheezing.   Abdominal:      Tenderness: There is no abdominal tenderness.   Musculoskeletal:      Right lower leg: No edema.      Left lower leg: No edema.   Skin:     General: Skin is warm.   Neurological:      Mental Status: She is alert and oriented to person, place, and time.   Psychiatric:         Mood and Affect: Mood normal.        Last Recorded Vitals  /62 (BP Location: Right arm, Patient Position: Sitting)    Pulse 94   Temp 37.3 °C (99.1 °F) (Temporal)   Resp 20   Wt 61.2 kg (135 lb)   SpO2 95%     Relevant Results  Scheduled medications  Scheduled Medications[2]  Continuous medications  Continuous Medications[3]  PRN medications  PRN Medications[4]  Results for orders placed or performed during the hospital encounter of 07/09/25 (from the past 24 hours)   Sars-CoV-2 and Influenza A/B PCR   Result Value Ref Range    Flu A Result Not Detected Not Detected    Flu B Result Not Detected Not Detected    Coronavirus 2019, PCR Not Detected Not Detected   CBC and Auto Differential   Result Value Ref Range    WBC 7.4 4.4 - 11.3 x10*3/uL    nRBC 0.0 0.0 - 0.0 /100 WBCs    RBC 3.92 (L) 4.00 - 5.20 x10*6/uL    Hemoglobin 12.6 12.0 - 16.0 g/dL    Hematocrit 36.9 36.0 - 46.0 %    MCV 94 80 - 100 fL    MCH 32.1 26.0 - 34.0 pg    MCHC 34.1 32.0 - 36.0 g/dL    RDW 13.2 11.5 - 14.5 %    Platelets 305 150 - 450 x10*3/uL    Neutrophils % 78.9 40.0 - 80.0 %    Immature Granulocytes %, Automated 0.5 0.0 - 0.9 %    Lymphocytes % 12.2 13.0 - 44.0 %    Monocytes % 8.0 2.0 - 10.0 %    Eosinophils % 0.0 0.0 - 6.0 %    Basophils % 0.4 0.0 - 2.0 %    Neutrophils Absolute 5.80 1.20 - 7.70 x10*3/uL    Immature Granulocytes Absolute, Automated 0.04 0.00 - 0.70 x10*3/uL    Lymphocytes Absolute 0.90 (L) 1.20 - 4.80 x10*3/uL    Monocytes Absolute 0.59 0.10 - 1.00 x10*3/uL    Eosinophils Absolute 0.00 0.00 - 0.70 x10*3/uL    Basophils Absolute 0.03 0.00 - 0.10 x10*3/uL   Comprehensive Metabolic Panel   Result Value Ref Range    Glucose 96 74 - 99 mg/dL    Sodium 130 (L) 136 - 145 mmol/L    Potassium 3.7 3.5 - 5.3 mmol/L    Chloride 94 (L) 98 - 107 mmol/L    Bicarbonate 24 21 - 32 mmol/L    Anion Gap 16 10 - 20 mmol/L    Urea Nitrogen 19 6 - 23 mg/dL    Creatinine 0.65 0.50 - 1.05 mg/dL    eGFR >90 >60 mL/min/1.73m*2    Calcium 9.4 8.6 - 10.3 mg/dL    Albumin 4.6 3.4 - 5.0 g/dL    Alkaline Phosphatase 71 33 - 136 U/L    Total Protein 7.0 6.4 - 8.2 g/dL     AST 15 9 - 39 U/L    Bilirubin, Total 0.7 0.0 - 1.2 mg/dL    ALT 20 7 - 45 U/L   Lactate   Result Value Ref Range    Lactate 1.5 0.4 - 2.0 mmol/L   Troponin I, High Sensitivity   Result Value Ref Range    Troponin I, High Sensitivity 7 0 - 13 ng/L     XR chest 1 view  Result Date: 7/9/2025  STUDY: Chest Radiograph;  07/09/2025 04:59PM INDICATION: Fever, tachycardia. COMPARISON: CT CAP 05/28/2025; XR Chest 07/06/2025 ACCESSION NUMBER(S): [id number] ORDERING CLINICIAN: PEDRO SINGH TECHNIQUE:  Frontal chest was obtained at 16:59 hours. FINDINGS: CARDIOMEDIASTINAL SILHOUETTE: Cardiomediastinal silhouette is normal in size and configuration.  LUNGS: Lungs are clear.  ABDOMEN: No remarkable upper abdominal findings.  BONES: No acute osseous changes.    No acute cardiopulmonary disease. Signed by Juan Diggs    XR chest 2 views  Result Date: 7/6/2025  STUDY: Chest Radiographs;  07/06/2025 10:00 AM INDICATION: Productive cough for four days. History of uterine cancer with mets on chemo. COMPARISON: CT Chest Abdomen and Pelvis 05/28/2025. XR Chest 03/04/2025, 02/25/2025 ACCESSION NUMBER(S): [id number] ORDERING CLINICIAN: CLAUDIA TIM TECHNIQUE:  Frontal and lateral chest. FINDINGS: CARDIOMEDIASTINAL SILHOUETTE: Cardiomediastinal silhouette is normal in size and configuration.  LUNGS: Lungs are clear.  ABDOMEN: No remarkable upper abdominal findings.  BONES: No acute osseous changes.    No acute pulmonary pathology. Signed by Ashvin Garcia MD        Assessment/Plan   Assessment & Plan  Fever, unspecified fever cause      69-year-old female with PMHx of hypertension, hyperlipidemia, Khan's esophagus, breast cancer 2007, uterine cancers/p uterine removal 2023 with lung mets s/p radiation, on chemotherapy now last session 2 weeks ago presenting with fever and chills.  Patient recently diagnosed with ear pain 2 weeks ago, antibiotic switched to cefdinir 4 days ago.  Patient has fever and chills  started 4 days  ago, still she has mild ear pain, no drainage.  Patient reports difficulty finding words for a while.  She denies chest pain, shortness of breath, abdominal pain, change in bowel or urinary habits.  Notably patient has uterine cancer diagnosed in 2023 s/p uterine removal now she found to have lung metastasis on chemotherapy next cycle supposed to be on next Monday, patient follow with Dr. Palacio.      #Fever in immunocompromise patient  #Recent otitis media  #Hx of uterine cancer s/p removal with lung mets s/p radiation, chemotherapy now  Patient presented with fever and chills, highest temp at home 102, she recently has otitis media treated with antibiotic 2 weeks ago however still has ear pain so antibiotic switched 4 days ago to cefdinir.  Patient on chemotherapy last cycle 2 weeks ago supposed to have next cycle on Monday.  Lab remarkable for mild hyponatremia however CBC normal with normal leukocyte 7.4.  EKG reveals sinus tachycardia, no ischemic changes.  Chest x-ray unremarkable    Plan:  - Admit for observation  -Continue cefepime for ear infection, to cover Pseudomonas in setting of immunocompromise and fever (during admission patient continued to have a spike of 38 degree)  -Blood culture--- pending result  -Temperature trend  -Tylenol as needed for fever  -Urinalysis to rule out urinary tract infection  - Patient has difficult finding word, will defer to day team if she benefit from ct head to rule out brain mets.of note pt has no other neurological finding per physical exam.      Global plan of care:  Diet: Regular  Consultation: None  DVT prophylaxis: Enoxaparin  Disposition: Anticipate 1-2 midnight pending blood culture  CODE STATUS: Full code      To be staffed with attending physician  Marielle George MD  Internal Medicine, PGY 2         [1]   Family History  Problem Relation Name Age of Onset    Osteoporosis Mother      Thyroid disease Mother      Hypertension Father      Breast cancer Sibling       Breast cancer Daughter      Stroke Other Grandparent     Hypertension Other Grandparent     Thyroid disease Mother Mine Lazo     Arthritis Mother Mine Lazo     Osteoporosis Mother Mine Lazo     Cancer Sister Sonia Spicer     Breast cancer Sister Sonia Spicer     Hypertension Father Juan Antonio Arredondo     Cancer Sister Sonia Spicer     Arthritis Mother Mine Lazo     Cancer Sister Sonia Spicer     Breast cancer Sister Sonia Spicer     BRCA1 Negative Sister Sonia Spicer     Hypertension Father Juan Antonio Arredondo     Breast cancer Daughter Janet Vanessa     BRCA1 Negative Daughter Janet Vanessa     Cancer Sister Sonia Spicer     Cancer Sister Sonia Spicer    [2] [START ON 7/10/2025] cefepime, 2 g, intravenous, q8h  enoxaparin, 40 mg, subcutaneous, q24h  escitalopram, 10 mg, oral, Daily  [START ON 7/10/2025] lisinopril, 20 mg, oral, Daily before breakfast  propranolol, 10 mg, oral, BID  [3]    [4] PRN medications: ondansetron, prochlorperazine

## 2025-07-11 LAB
ALBUMIN SERPL BCP-MCNC: 3.8 G/DL (ref 3.4–5)
ALBUMIN SERPL BCP-MCNC: 3.8 G/DL (ref 3.4–5)
ANION GAP SERPL CALC-SCNC: 10 MMOL/L (ref 10–20)
ANION GAP SERPL CALC-SCNC: 13 MMOL/L
ATRIAL RATE: 122 BPM
BACTERIA BLD CULT: NORMAL
BACTERIA BLD CULT: NORMAL
BASOPHILS # BLD AUTO: 0.03 X10*3/UL (ref 0–0.1)
BASOPHILS NFR BLD AUTO: 0.5 %
BUN SERPL-MCNC: 8 MG/DL (ref 6–23)
BUN SERPL-MCNC: 9 MG/DL (ref 6–23)
CALCIUM SERPL-MCNC: 8.5 MG/DL (ref 8.6–10.3)
CALCIUM SERPL-MCNC: 8.9 MG/DL (ref 8.6–10.3)
CHLORIDE SERPL-SCNC: 93 MMOL/L (ref 98–107)
CHLORIDE SERPL-SCNC: 97 MMOL/L (ref 98–107)
CO2 SERPL-SCNC: 23 MMOL/L (ref 21–32)
CO2 SERPL-SCNC: 24 MMOL/L (ref 21–32)
CREAT SERPL-MCNC: 0.35 MG/DL (ref 0.5–1.05)
CREAT SERPL-MCNC: 0.41 MG/DL (ref 0.5–1.05)
EGFRCR SERPLBLD CKD-EPI 2021: >90 ML/MIN/1.73M*2
EGFRCR SERPLBLD CKD-EPI 2021: >90 ML/MIN/1.73M*2
EOSINOPHIL # BLD AUTO: 0.01 X10*3/UL (ref 0–0.7)
EOSINOPHIL NFR BLD AUTO: 0.2 %
ERYTHROCYTE [DISTWIDTH] IN BLOOD BY AUTOMATED COUNT: 12.8 % (ref 11.5–14.5)
GLUCOSE SERPL-MCNC: 133 MG/DL (ref 74–99)
GLUCOSE SERPL-MCNC: 83 MG/DL (ref 74–99)
HCT VFR BLD AUTO: 30.2 % (ref 36–46)
HGB BLD-MCNC: 10.3 G/DL (ref 12–16)
IMM GRANULOCYTES # BLD AUTO: 0.03 X10*3/UL (ref 0–0.7)
IMM GRANULOCYTES NFR BLD AUTO: 0.5 % (ref 0–0.9)
LYMPHOCYTES # BLD AUTO: 0.68 X10*3/UL (ref 1.2–4.8)
LYMPHOCYTES NFR BLD AUTO: 12.4 %
MAGNESIUM SERPL-MCNC: 1.47 MG/DL (ref 1.6–2.4)
MCH RBC QN AUTO: 32.4 PG (ref 26–34)
MCHC RBC AUTO-ENTMCNC: 34.1 G/DL (ref 32–36)
MCV RBC AUTO: 95 FL (ref 80–100)
MONOCYTES # BLD AUTO: 0.64 X10*3/UL (ref 0.1–1)
MONOCYTES NFR BLD AUTO: 11.7 %
MRSA DNA SPEC QL NAA+PROBE: NOT DETECTED
NEUTROPHILS # BLD AUTO: 4.09 X10*3/UL (ref 1.2–7.7)
NEUTROPHILS NFR BLD AUTO: 74.7 %
NRBC BLD-RTO: 0 /100 WBCS (ref 0–0)
P AXIS: 53 DEGREES
P OFFSET: 180 MS
P ONSET: 140 MS
PHOSPHATE SERPL-MCNC: 2.6 MG/DL (ref 2.5–4.9)
PHOSPHATE SERPL-MCNC: 3 MG/DL (ref 2.5–4.9)
PLATELET # BLD AUTO: 219 X10*3/UL (ref 150–450)
POTASSIUM SERPL-SCNC: 3.6 MMOL/L (ref 3.5–5.3)
POTASSIUM SERPL-SCNC: 4.2 MMOL/L (ref 3.5–5.3)
PR INTERVAL: 156 MS
Q ONSET: 218 MS
QRS COUNT: 20 BEATS
QRS DURATION: 66 MS
QT INTERVAL: 304 MS
QTC CALCULATION(BAZETT): 433 MS
QTC FREDERICIA: 385 MS
R AXIS: -48 DEGREES
RBC # BLD AUTO: 3.18 X10*6/UL (ref 4–5.2)
SODIUM SERPL-SCNC: 125 MMOL/L (ref 136–145)
SODIUM SERPL-SCNC: 127 MMOL/L (ref 136–145)
T AXIS: 34 DEGREES
T OFFSET: 370 MS
VENTRICULAR RATE: 122 BPM
WBC # BLD AUTO: 5.5 X10*3/UL (ref 4.4–11.3)

## 2025-07-11 PROCEDURE — 36415 COLL VENOUS BLD VENIPUNCTURE: CPT

## 2025-07-11 PROCEDURE — 2500000005 HC RX 250 GENERAL PHARMACY W/O HCPCS

## 2025-07-11 PROCEDURE — 2500000004 HC RX 250 GENERAL PHARMACY W/ HCPCS (ALT 636 FOR OP/ED)

## 2025-07-11 PROCEDURE — 2500000002 HC RX 250 W HCPCS SELF ADMINISTERED DRUGS (ALT 637 FOR MEDICARE OP, ALT 636 FOR OP/ED)

## 2025-07-11 PROCEDURE — 87641 MR-STAPH DNA AMP PROBE: CPT

## 2025-07-11 PROCEDURE — 2500000001 HC RX 250 WO HCPCS SELF ADMINISTERED DRUGS (ALT 637 FOR MEDICARE OP)

## 2025-07-11 PROCEDURE — 1100000001 HC PRIVATE ROOM DAILY

## 2025-07-11 PROCEDURE — 2500000002 HC RX 250 W HCPCS SELF ADMINISTERED DRUGS (ALT 637 FOR MEDICARE OP, ALT 636 FOR OP/ED): Performed by: INTERNAL MEDICINE

## 2025-07-11 PROCEDURE — 85025 COMPLETE CBC W/AUTO DIFF WBC: CPT

## 2025-07-11 PROCEDURE — 80069 RENAL FUNCTION PANEL: CPT

## 2025-07-11 PROCEDURE — 2500000001 HC RX 250 WO HCPCS SELF ADMINISTERED DRUGS (ALT 637 FOR MEDICARE OP): Performed by: INTERNAL MEDICINE

## 2025-07-11 PROCEDURE — 97165 OT EVAL LOW COMPLEX 30 MIN: CPT | Mod: GO | Performed by: OCCUPATIONAL THERAPIST

## 2025-07-11 PROCEDURE — 83735 ASSAY OF MAGNESIUM: CPT

## 2025-07-11 PROCEDURE — 97161 PT EVAL LOW COMPLEX 20 MIN: CPT | Mod: GP

## 2025-07-11 PROCEDURE — 99233 SBSQ HOSP IP/OBS HIGH 50: CPT

## 2025-07-11 RX ORDER — POTASSIUM CHLORIDE 20 MEQ/1
40 TABLET, EXTENDED RELEASE ORAL ONCE
Status: COMPLETED | OUTPATIENT
Start: 2025-07-11 | End: 2025-07-11

## 2025-07-11 RX ORDER — AMOXICILLIN AND CLAVULANATE POTASSIUM 875; 125 MG/1; MG/1
1 TABLET, FILM COATED ORAL EVERY 12 HOURS SCHEDULED
Status: DISCONTINUED | OUTPATIENT
Start: 2025-07-11 | End: 2025-07-12

## 2025-07-11 RX ORDER — SODIUM CHLORIDE, SODIUM LACTATE, POTASSIUM CHLORIDE, CALCIUM CHLORIDE 600; 310; 30; 20 MG/100ML; MG/100ML; MG/100ML; MG/100ML
75 INJECTION, SOLUTION INTRAVENOUS CONTINUOUS
Status: ACTIVE | OUTPATIENT
Start: 2025-07-11 | End: 2025-07-12

## 2025-07-11 RX ORDER — FLUTICASONE PROPIONATE 50 MCG
2 SPRAY, SUSPENSION (ML) NASAL DAILY
Status: DISCONTINUED | OUTPATIENT
Start: 2025-07-11 | End: 2025-07-12 | Stop reason: HOSPADM

## 2025-07-11 RX ORDER — MAGNESIUM CHLORIDE 64 MG
64 TABLET, DELAYED RELEASE (ENTERIC COATED) ORAL ONCE
Status: COMPLETED | OUTPATIENT
Start: 2025-07-11 | End: 2025-07-11

## 2025-07-11 RX ADMIN — OXYCODONE HYDROCHLORIDE 5 MG: 5 TABLET ORAL at 23:59

## 2025-07-11 RX ADMIN — SODIUM CHLORIDE, SODIUM LACTATE, POTASSIUM CHLORIDE, AND CALCIUM CHLORIDE 75 ML/HR: .6; .31; .03; .02 INJECTION, SOLUTION INTRAVENOUS at 09:19

## 2025-07-11 RX ADMIN — FLUTICASONE PROPIONATE 2 SPRAY: 50 SPRAY, METERED NASAL at 12:19

## 2025-07-11 RX ADMIN — LISINOPRIL 20 MG: 20 TABLET ORAL at 06:22

## 2025-07-11 RX ADMIN — LIDOCAINE 4% 1 PATCH: 40 PATCH TOPICAL at 09:16

## 2025-07-11 RX ADMIN — CEFEPIME HYDROCHLORIDE 2 G: 2 INJECTION, SOLUTION INTRAVENOUS at 09:16

## 2025-07-11 RX ADMIN — CEFEPIME HYDROCHLORIDE 2 G: 2 INJECTION, SOLUTION INTRAVENOUS at 01:47

## 2025-07-11 RX ADMIN — ONDANSETRON 4 MG: 2 INJECTION INTRAMUSCULAR; INTRAVENOUS at 01:51

## 2025-07-11 RX ADMIN — MAGNESIUM 64 MG (MAGNESIUM CHLORIDE) TABLET,DELAYED RELEASE 1 TABLET: at 12:19

## 2025-07-11 RX ADMIN — POTASSIUM CHLORIDE 40 MEQ: 1500 TABLET, EXTENDED RELEASE ORAL at 09:17

## 2025-07-11 RX ADMIN — SODIUM CHLORIDE, SODIUM LACTATE, POTASSIUM CHLORIDE, AND CALCIUM CHLORIDE 75 ML/HR: .6; .31; .03; .02 INJECTION, SOLUTION INTRAVENOUS at 00:05

## 2025-07-11 RX ADMIN — VANCOMYCIN HYDROCHLORIDE 1250 MG: 1.25 INJECTION, SOLUTION INTRAVITREAL at 12:19

## 2025-07-11 RX ADMIN — AMOXICILLIN AND CLAVULANATE POTASSIUM 1 TABLET: 875; 125 TABLET, FILM COATED ORAL at 20:15

## 2025-07-11 RX ADMIN — OXYCODONE HYDROCHLORIDE 5 MG: 5 TABLET ORAL at 01:47

## 2025-07-11 RX ADMIN — VANCOMYCIN HYDROCHLORIDE 1250 MG: 1.25 INJECTION, SOLUTION INTRAVITREAL at 00:06

## 2025-07-11 RX ADMIN — ESCITALOPRAM OXALATE 10 MG: 10 TABLET ORAL at 09:17

## 2025-07-11 RX ADMIN — SODIUM CHLORIDE, SODIUM LACTATE, POTASSIUM CHLORIDE, AND CALCIUM CHLORIDE 75 ML/HR: .6; .31; .03; .02 INJECTION, SOLUTION INTRAVENOUS at 15:58

## 2025-07-11 RX ADMIN — ACETAMINOPHEN 975 MG: 325 TABLET ORAL at 09:16

## 2025-07-11 ASSESSMENT — PAIN SCALES - GENERAL
PAINLEVEL_OUTOF10: 8
PAINLEVEL_OUTOF10: 8
PAINLEVEL_OUTOF10: 0 - NO PAIN

## 2025-07-11 ASSESSMENT — COGNITIVE AND FUNCTIONAL STATUS - GENERAL
PERSONAL GROOMING: A LITTLE
HELP NEEDED FOR BATHING: A LOT
EATING MEALS: A LITTLE
DAILY ACTIVITIY SCORE: 18
DAILY ACTIVITIY SCORE: 18
MOVING TO AND FROM BED TO CHAIR: A LITTLE
HELP NEEDED FOR BATHING: A LITTLE
DRESSING REGULAR UPPER BODY CLOTHING: A LITTLE
MOBILITY SCORE: 20
TOILETING: A LITTLE
DRESSING REGULAR UPPER BODY CLOTHING: A LITTLE
WALKING IN HOSPITAL ROOM: A LITTLE
STANDING UP FROM CHAIR USING ARMS: A LITTLE
DRESSING REGULAR LOWER BODY CLOTHING: A LITTLE
CLIMB 3 TO 5 STEPS WITH RAILING: A LOT
DRESSING REGULAR UPPER BODY CLOTHING: A LITTLE
TOILETING: A LITTLE
DRESSING REGULAR LOWER BODY CLOTHING: A LITTLE
CLIMB 3 TO 5 STEPS WITH RAILING: A LITTLE
MOVING FROM LYING ON BACK TO SITTING ON SIDE OF FLAT BED WITH BEDRAILS: A LITTLE
PERSONAL GROOMING: A LITTLE
MOVING TO AND FROM BED TO CHAIR: A LITTLE
DAILY ACTIVITIY SCORE: 18
TURNING FROM BACK TO SIDE WHILE IN FLAT BAD: A LITTLE
MOBILITY SCORE: 20
STANDING UP FROM CHAIR USING ARMS: A LITTLE
STANDING UP FROM CHAIR USING ARMS: A LITTLE
EATING MEALS: A LITTLE
PERSONAL GROOMING: A LITTLE
WALKING IN HOSPITAL ROOM: A LITTLE
HELP NEEDED FOR BATHING: A LITTLE
CLIMB 3 TO 5 STEPS WITH RAILING: A LITTLE
MOVING TO AND FROM BED TO CHAIR: A LITTLE
MOBILITY SCORE: 17
WALKING IN HOSPITAL ROOM: A LITTLE
TOILETING: A LITTLE
DRESSING REGULAR LOWER BODY CLOTHING: A LITTLE

## 2025-07-11 ASSESSMENT — PAIN DESCRIPTION - LOCATION
LOCATION: BACK
LOCATION: OTHER (COMMENT)

## 2025-07-11 ASSESSMENT — PAIN - FUNCTIONAL ASSESSMENT
PAIN_FUNCTIONAL_ASSESSMENT: 0-10

## 2025-07-11 ASSESSMENT — PAIN DESCRIPTION - DESCRIPTORS
DESCRIPTORS: ACHING;DISCOMFORT
DESCRIPTORS: ACHING

## 2025-07-11 ASSESSMENT — PAIN SCALES - WONG BAKER: WONGBAKER_NUMERICALRESPONSE: HURTS LITTLE BIT

## 2025-07-11 NOTE — CONSULTS
Consults    69-year-old lady.  Came in for fever and chills.    Ear pain a couple of weeks ago treated with antibiotics.  Antibiotics were switched.  Temperature was 102 over the weekend.  She had chills.  She had dizziness.  No nausea or vomiting.  Started noticing difficulty finding words lasting for a while.    Noted to have very slow response to questions.  To some of the questions she stated I do not know.  She was able to state her name.  Seemed to have some difficulty finding words to answer.  No facial droop noted.  Sensations intact.  Strength was good.    She was started on vancomycin and cefepime.    She was complaining of fatigue and pain.  Then later pain was not a problem although she had mild nausea.    I interviewed the patient.  She told me today she is feeling much better.  No vomiting or nausea.  She had decreased appetite.  Slightly muffled hearing left ear.  No discharge from the ear.    Other relevant history-  Hypertension  Hyperlipidemia  Khan's esophagus  Breast cancer 2007  Uterine cancer-had surgery 2023.  Lung metastatic disease and had radiation and chemotherapy.  Last session was a couple of weeks prior.  Osteopenia      Quit smoking.    Family history of thyroid disease hypertension breast cancer stroke osteoporosis    WBC normal.  Hemoglobin normal.  Platelet count normal  Glucose 96  Sodium was low 139 potassium normal.  BUN normal.  Creatinine normal.  Hepatic enzymes normal.  Chest x-ray no acute pulmonary pathology.    HIV nonreactive.  Blood cultures negative so far    CT brain-I independently reviewed the images-no acute process.  Nonspecific white matter changes seen.  Intracranial atherosclerotic changes seen.  Left frontal, ethmoid, maxillary sinusitis.    Examination:    Neurologic exam:  Mental status : speech, language, attention, concentration, orientation to time/Place/person/situation, fund of knowledge were normal.  Answering questions promptly today.  Cranial  nerves: cranial nerves were examined.  Pupils were equal and reactive to light.  External ocular movements were normal.  Visual fields were normal.  Face was symmetric.  Tongue was in the midline.  Soft palate move normally.  Facial sensations were normal.  Hearing was normal.  Neck : supple.  Gait and Station : Not evaluated.  Strength : normal.  Muscle tone : normal.  Abnormal involuntary movements : Slight tremulousness of the outstretched hands..  Atrophy/fasciculations : none.  Muscle stretch reflexes : normal.  Pathologic reflexes : absent.  Sensory : normal.  Coordination : normal.  Cardiovascular : normal first and second heart sounds.  No murmur.  No cranial or cervical bruits.    Chest : chest expansion was normal.  Breath sounds normal.  No adventitious sounds.  No rigidity or bradykinesia.    Impression discussion plan    Febrile illness.  She has evidence of sinusitis on imaging studies.  No intracranial process.  She is on antibiotics.  Infectious disease specialist is following her.  She is in a immunomodulated state because of chemotherapy.    Feeling much better today.  I think the metabolic toxic encephalopathy caused slowness of thinking and word finding difficulty.  Sodium also was running low hyponatremia.  No evidence of stroke or bleed intracranially.    Continue supportive care at this time.    Differential diagnosis pathogenesis prognosis test results discussed in detail with the patient.  I communicated with the attending physician

## 2025-07-11 NOTE — PROGRESS NOTES
Vancomycin Dosing by Pharmacy- Cessation of Therapy    Consult to pharmacy for vancomycin dosing has been discontinued by the prescriber, pharmacy will sign off at this time.    Please call pharmacy if there are further questions or re-enter a consult if vancomycin is resumed.     Zachariah Mcguire, PharmD

## 2025-07-11 NOTE — CONSULTS
Infectious disease consult Note      Patient Mica Vanessa PCP Joi Valle DO    MRN [id number]  Admission Date 7/9/2025      Chief Complaint/Reason for Admission:  Mica is a 69 y.o. female who presented to the ED for history of fever and chills for the last 2 weeks.    Subjective    Subjective   History of Presenting Illness  Ms. 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 She has metastatic disease to the lungs and is currently receiving pembrolizumab with paclitaxel and carboplatin (last cycle 2 weeks ago). She presented with intermittent fever and altered mental status, and ID was consulted for further evaluation.    She had a recent history of ear pain treated with antibiotics and had been switched to cefdinir during a prior ED visit without improvement. On presentation, she was afebrile but tachycardic, and initial labs showed hyponatremia and hypochloremia, with no leukocytosis. Chest X-ray was clear. Blood cultures were drawn, and empiric cefepime was initiated.    During hospitalization, she developed a fever to 38.8 C on 7/9 but remained afebrile otherwise. She remained hemodynamically stable with normal platelets and no leukocytosis. Additional labs showed persistent hyponatremia and hypomagnesemia, but were otherwise unremarkable. UA was notable for hematuria and ketonuria. Respiratory viral panel was negative for flu and COVID.    CT head revealed left frontal, ethmoid, and maxillary sinusitis, which may explain her persistent symptoms. CT chest/abdomen/pelvis showed no acute abnormalities.    Blood cultures from 7/6 were negative at 4 days, and repeat cultures from 7/9 remain negative so far. There is no history of prior bacteremia. The patient is currently being treated with vancomycin and cefepime.    Past Medical History  Active Ambulatory Problems      Diagnosis Date Noted    Postmenopausal bleeding 10/02/2023    HTN (hypertension) 10/06/2023    Gastroesophageal reflux disease 10/06/2023    Abnormal EKG 10/19/2023    Angina pectoris 10/19/2023    Khan's esophagus 01/03/2019    Benign neoplasm of ciliary body 10/29/2019    Benign tumor of ciliary body, left 10/29/2019    Colon polyp, hyperplastic 12/01/2015    Hyperlipemia 12/01/2015    Malignant neoplasm of breast 05/30/2013    Nuclear senile cataract of left eye 10/29/2019    HTN (hypertension), benign 12/01/2015    Malignant neoplasm of endometrium (Multi) 11/10/2023    Upper respiratory tract infection 10/22/2024    Lung nodules 11/04/2024    History of malignant neoplasm of breast 02/17/2025    Seasonal allergies 02/17/2025    Chemotherapy management, encounter for 03/31/2025     Resolved Ambulatory Problems     Diagnosis Date Noted    No Resolved Ambulatory Problems     Past Medical History:   Diagnosis Date    Abnormal ECG     Anxiety 10/2024    Arthritis 2022    Khan esophagus     Breast cancer 2007    Dysfunctional uterine bleeding     Essential (primary) hypertension 09/08/2022    GERD (gastroesophageal reflux disease)     Hx antineoplastic chemo     Hyperlipidemia     Osteopenia     Other seasonal allergic rhinitis     Personal history of irradiation     Personal history of malignant neoplasm of breast     Personal history of other diseases of the circulatory system     Personal history of other diseases of the digestive system        Past Surgical History   Surgical History[1]  Social History  Social History     Socioeconomic History    Marital status: Legally      Spouse name: Not on file    Number of children: Not on file    Years of education: Not on file    Highest education level: Not on file   Occupational History    Not on file   Tobacco Use    Smoking status: Former     Current packs/day: 0.00     Average packs/day: 0.5 packs/day for 32.0 years (16.0 ttl pk-yrs)     Types:  Cigarettes     Start date: 1975     Quit date: 2007     Years since quittin.0    Smokeless tobacco: Never    Tobacco comments:     Used infrequent since    Vaping Use    Vaping status: Former   Substance and Sexual Activity    Alcohol use: Never    Drug use: Not Currently     Types: Marijuana    Sexual activity: Not Currently     Partners: Male     Birth control/protection: Female Sterilization   Other Topics Concern    Not on file   Social History Narrative    Not on file     Social Drivers of Health     Financial Resource Strain: Low Risk  (7/10/2025)    Overall Financial Resource Strain (CARDIA)     Difficulty of Paying Living Expenses: Not very hard   Food Insecurity: No Food Insecurity (2025)    Hunger Vital Sign     Worried About Running Out of Food in the Last Year: Never true     Ran Out of Food in the Last Year: Never true   Transportation Needs: No Transportation Needs (7/10/2025)    PRAPARE - Transportation     Lack of Transportation (Medical): No     Lack of Transportation (Non-Medical): No   Physical Activity: Insufficiently Active (2024)    Received from [facility]    Exercise Vital Sign     Days of Exercise per Week: 4 days     Minutes of Exercise per Session: 30 min   Stress: Stress Concern Present (2024)    Received from [facility]    [employer] - Occupational Stress Questionnaire     Feeling of Stress : Rather much   Social Connections: Moderately Isolated (2024)    Received from [facility]    Social Connection and Isolation Panel [NHANES]     Frequency of Communication with Friends and Family: More than three times a week     Frequency of Social Gatherings with Friends and Family: More than three times a week     Attends Islam Services: Never     Active Member of Clubs or Organizations: Yes     Attends Club or Organization Meetings: More than 4 times per year     Marital Status:    Intimate Partner Violence: Not  At Risk (7/9/2025)    Humiliation, Afraid, Rape, and Kick questionnaire     Fear of Current or Ex-Partner: No     Emotionally Abused: No     Physically Abused: No     Sexually Abused: No   Housing Stability: Low Risk  (7/10/2025)    Housing Stability Vital Sign     Unable to Pay for Housing in the Last Year: No     Number of Times Moved in the Last Year: 0     Homeless in the Last Year: No       Home Medication:  Prior to Admission medications    Medication Sig Start Date End Date Taking? Authorizing Provider   cefdinir (Omnicef) 300 mg capsule Take 1 capsule (300 mg) by mouth 2 times a day for 10 days. 7/6/25 7/16/25 Yes Willard Thapa,    escitalopram (Lexapro) 10 mg tablet Take 1 tablet (10 mg) by mouth once daily. 2/19/25 8/18/25 Yes Joi Valle,    lisinopril 20 mg tablet Take 1 tablet (20 mg) by mouth once daily in the morning. Take before meals. 5/22/25  Yes Historical Provider, MD   loratadine (Claritin) 10 mg tablet Take 1 tablet (10 mg) by mouth once daily at bedtime.   Yes Historical Provider, MD   magnesium gluconate (Magonate) 27 mg magnesium (500 mg) tablet Take 2 tablets (54 mg) by mouth 2 times a day. 3/21/25  Yes Nannette Palacio MD   ondansetron (Zofran) 8 mg tablet Take 1 tablet (8 mg) by mouth every 8 hours if needed for nausea or vomiting. 3/21/25  Yes Nannette Palacio MD   prochlorperazine (Compazine) 10 mg tablet Take 1 tablet (10 mg) by mouth every 6 hours if needed for nausea or vomiting. 3/21/25  Yes Nannette Palacio MD   propranolol (Inderal) 10 mg tablet TAKE 1 TABLET (10 MG) BY MOUTH 2 TIMES A DAY AS NEEDED (ANXIETY, PALPITATIONS). 5/20/25  Yes Historical Provider, MD   sennosides (senna) 8.6 mg capsule Take 1 capsule (8.6 mg) by mouth once daily at bedtime.  Patient taking differently: Take 1 capsule (8.6 mg) by mouth as needed at bedtime (constipation). 4/21/25  Yes Kristin Soares, APRN-CNP   amoxicillin-clavulanate (Augmentin) 875-125 mg tablet Take 1 tablet by mouth 2  "times a day for 7 days.  Patient not taking: Reported on 7/9/2025 6/30/25 7/7/25  Linsey Sierra,    dexAMETHasone (Decadron) 4 mg tablet Take 5 tablets (20 mg) by mouth once 12 hours prior to PACLitaxel treatment. Then take 2 tablets (8 mg) by mouth once daily for 3 days starting the day after treatment.  Patient not taking: Reported on 7/9/2025 3/21/25   Nannette Palacio MD   LORazepam (Ativan) 0.5 mg tablet Take 1 tablet (0.5 mg) by mouth every 8 hours if needed for anxiety for up to 10 days.  Patient not taking: Reported on 7/9/2025 3/25/25 4/4/25  SANDHYA Crisostomo-CNP        Family History  Family History[2]    Allergies:  RX Allergies[3]     Review of System:  Review of systems shows no findings other than what is described in the narrative above.     Objective    Objective   Vital Signs:  Visit Vitals  /66 (BP Location: Right arm, Patient Position: Lying)   Pulse 77   Temp 36.5 °C (97.7 °F) (Temporal)   Resp 18   Ht 1.575 m (5' 2\")   Wt 63.9 kg (140 lb 14.4 oz)   SpO2 97%   BMI 25.77 kg/m²   OB Status Postmenopausal   Smoking Status Former   BSA 1.67 m²        Physical Examination:    Constitutional: A&Ox4, NAD, resting comfortable   Head and Face: Atraumatic, normocephalic   Eyes: Normal external exam, EOMI  ENT: Sinuses were not tender on palpation.  Cardiovascular: Normal S1-S2 with no murmurs.  Pulmonary: CTAB, no respiratory distress, no wheezing, rales or rhonchi, on RA  Abdomen: Soft, nontender, positive bowel sounds, no organomegaly.  MSK: Negative for edema, No joint swelling, normal movements of all extremities.   Neuro: Negative kernig and brudzinski.  Skin: No splinter hemorrhages or Janeway lesion.  No skin rashes.  Psychiatric: Judgment intact. Appropriate mood, affect and behavior    Laboratory Data:    Results from last 7 days   Lab Units 07/11/25  0501 07/10/25  0600 07/09/25  1647   WBC AUTO x10*3/uL 5.5 4.6 7.4   RBC AUTO x10*6/uL 3.18* 3.28* 3.92*   HEMOGLOBIN g/dL " 10.3* 10.4* 12.6     Results from last 7 days   Lab Units 07/11/25  0500 07/10/25  0600 07/09/25  1647 07/06/25  0916   SODIUM mmol/L 125* 130* 130* 134*   POTASSIUM mmol/L 3.6 3.4* 3.7 3.6   CHLORIDE mmol/L 93* 99 94* 98   CO2 mmol/L 23 20* 24 28   BUN mg/dL 9 16 19 14   CREATININE mg/dL 0.35* 0.42* 0.65 0.52   CALCIUM mg/dL 8.5* 8.3* 9.4 9.1   PHOSPHORUS mg/dL 2.6 3.1  --   --    MAGNESIUM mg/dL 1.47* 1.66  --   --    BILIRUBIN TOTAL mg/dL  --   --  0.7 0.6   ALT U/L  --   --  20 20   AST U/L  --   --  15 15       Imaging:  Imaging  CT head wo IV contrast  Result Date: 7/10/2025  No acute intracranial pathology.   Left frontal/ethmoid/maxillary sinusitis.   Signed by: Fernando Capps 7/10/2025 10:14 AM Dictation workstation:   [id number]    CT chest abdomen pelvis w IV contrast  Result Date: 7/10/2025  CHEST: 1.  No new active disease in the chest. 2. Scattered tiny pulmonary nodules are stable.   ABDOMEN-PELVIS: 1.  No acute findings of the abdomen or pelvis.     Signed by: Fernando Capps 7/10/2025 10:11 AM Dictation workstation:   [id number]    XR chest 1 view  Result Date: 7/9/2025  No acute cardiopulmonary disease. Signed by Juan Diggs      Cardiology, Vascular, and Other Imaging  Electrocardiogram, 12-lead ACS symptoms  Result Date: 7/10/2025  Sinus tachycardia Possible Left atrial enlargement Left anterior fascicular block Anterolateral infarct (cited on or before 16-MAR-2024) Abnormal ECG When compared with ECG of 16-MAR-2024 17:59, Vent. rate has increased BY  64 BPM QRS duration has decreased         Medications:  Scheduled medications  Scheduled Medications[4]  Continuous medications  Continuous Medications[5]  PRN medications  PRN Medications[6]    Assessment    Assessment & Plan     #Intermittent fever with unknown source  #Left frontal, ethmoid, maxillary sinusitis  #Metastatic endometrial cancer   #Immunocompromise status on chemotherapy    -History of unresolved ear pain previously treated with  antibiotics (cefdinir)  -Hemodynamically stable aside from transient fever and tachycardia  -CT head showed left frontal, ethmoid, and maxillary sinusitis  -Blood cultures from  and  remained negative  -No prior positive blood cultures    PLAN:  -Currently on vancomycin and cefepime  -On discharge Augmentin for 3 weeks.  -Follow-up as an outpatient with ID, Dr. Hendricks's after 3 weeks.  -Recommend follow-up appointment with ENT as an outpatient.  -Closely monitor for antibiotics side effects including rash, Diarrhea/CDI, thrombocytopenia, SHANNON, etc.    Will continue to follow.    The rest per the primary team.  Thank you for the consult.       Patient seen and discussed with the attending.  Signature: Darek Cyr MD PGY-3  Date: 2025  Please excuse any misspellings or unintended errors related to the Dragon speech recognition software used to dictate this note.          [1]   Past Surgical History:  Procedure Laterality Date    BREAST BIOPSY      BREAST LUMPECTOMY       SECTION, LOW TRANSVERSE  83, 84, 91    HYSTERECTOMY  2023    LUMBAR PERITONEAL SHUNT      OTHER SURGICAL HISTORY  2022     section    OTHER SURGICAL HISTORY  2022    Tonsillectomy    OTHER SURGICAL HISTORY  2022    Lumpectomy    SKIN CANCER EXCISION      vulva    TONSILLECTOMY  1960    TUBAL LIGATION     [2]   Family History  Problem Relation Name Age of Onset    Osteoporosis Mother      Thyroid disease Mother      Hypertension Father      Breast cancer Sibling      Breast cancer Daughter      Stroke Other Grandparent     Hypertension Other Grandparent     Thyroid disease Mother Mine Dannemiller     Arthritis Mother Mine Dannemiller     Osteoporosis Mother Mine Dannemiller     Cancer Sister Sonia Martins     Breast cancer Sister Sonia Spicer     Hypertension Father Juan Antonio Gerst     Cancer Sister Sonia Spicer     Arthritis Mother Mine Dannemiller     Cancer Sister Sonia Spicer     Breast cancer  Sister Sonia Spicer     BRCA1 Negative Sister Sonia Martins     Hypertension Father Juan Antonio Arredondo     Breast cancer Daughter Janet Vanessa     BRCA1 Negative Daughter Janet Vanessa     Cancer Sister Sonia Spicer     Cancer Sister Sonia Spicer    [3] No Known Allergies  [4] cefepime, 2 g, intravenous, q8h  enoxaparin, 40 mg, subcutaneous, q24h  escitalopram, 10 mg, oral, Daily  lidocaine, 1 patch, transdermal, Daily  lisinopril, 20 mg, oral, Daily before breakfast  magnesium chloride, 64 mg of elemental magnesium, oral, Once  vancomycin, 1,250 mg, intravenous, q12h  [5] lactated Ringer's, 75 mL/hr, Last Rate: 75 mL/hr (07/11/25 0919)  [6] PRN medications: acetaminophen, ondansetron, oxyCODONE, prochlorperazine, propranolol, vancomycin

## 2025-07-11 NOTE — SIGNIFICANT EVENT
I notified by bedside RN , patient fatigued and says has pain all over she received oxy and Zofran however she keeps moaning. Upon arrived patient was lying on bed she said still has back pain but improved   Per physical examination she is vitally stable , oriented x4 , chest is clear and heart sound normal , no murmur , abdomen is soft with no tenderness or guarding. Patient stated she feel her condition has been improved since admission but still feel fatigued .  Will continue day team plan with antibiotics and pain management.        Marielle George MD  Internal Medicine , PGY-2

## 2025-07-11 NOTE — CARE PLAN
The patient's goals for the shift include      The clinical goals for the shift include Patient will remain afebrile throughout shift.      Problem: Pain - Adult  Goal: Verbalizes/displays adequate comfort level or baseline comfort level  Outcome: Progressing     Problem: Safety - Adult  Goal: Free from fall injury  Outcome: Progressing     Problem: Nutrition  Goal: Nutrient intake appropriate for maintaining nutritional needs  Outcome: Progressing     Problem: Fall/Injury  Goal: Not fall by end of shift  Outcome: Progressing  Goal: Be free from injury by end of the shift  Outcome: Progressing  Goal: Verbalize understanding of personal risk factors for fall in the hospital  Outcome: Progressing  Goal: Verbalize understanding of risk factor reduction measures to prevent injury from fall in the home  Outcome: Progressing  Goal: Use assistive devices by end of the shift  Outcome: Progressing  Goal: Pace activities to prevent fatigue by end of the shift  Outcome: Progressing

## 2025-07-11 NOTE — CARE PLAN
The clinical goals for the shift include pt will remain afebrile throughout the shift  Pt met the above goal during the shift.

## 2025-07-11 NOTE — HOSPITAL COURSE
This is a pleasant 69-year-old female with a PMHx of hypertension, hyperlipidemia, Khan's esophagus, breast cancer 2007, uterine cancers/p uterine removal 2023 with lung mets s/p radiation, on chemotherapy (last treatment approximately 2 weeks ago) who presented for fever, Tmax 102 with chills and dizziness, fatigue.  In the ER she was tachycardic to 112 but afebrile and otherwise normal vitals.  Labs were significant for hyponatremia 130 hypochloremia 94 but no leukocytosis or leukopenia.  Lactate and troponin were normal.  Blood cultures were obtained, she received cefepime, and her primary oncologist recommended admission for observation.  Admission was complicated by intermittent confusion/delirium most prominently with word finding difficulty.  She underwent CT scans of her head, chest abdomen pelvis which showed sinusitis but otherwise no evidence of infection.  She was evaluated by infectious disease who recommended 3 weeks of Augmentin upon discharge and neurology who felt her neurosymptoms were more related to metabolic encephalopathy and CNS infection or neuropathology.  Hospital course was complicated by worsening hyponatremia to 125 in the setting of poor appetite which did improve with fluid resuscitation.

## 2025-07-11 NOTE — PROGRESS NOTES
Physical Therapy    Physical Therapy Evaluation    Patient Name: Mica Vanessa  MRN: [id number]  Today's Date: 7/11/2025   Time Calculation  Start Time: 1128  Stop Time: 1149  Time Calculation (min): 21 min  3127/3127-A    Assessment/Plan   PT Assessment  PT Assessment Results: Decreased endurance, Impaired balance, Decreased mobility, Decreased safety awareness  Rehab Prognosis: Good  Barriers to Discharge Home: Caregiver assistance, Physical needs  Caregiver Assistance: Patient lives alone and/or does not have reliable caregiver assistance  Physical Needs: 24hr mobility assistance needed, 24hr ADL assistance needed, High falls risk due to function or environment  Evaluation/Treatment Tolerance: Patient limited by fatigue  Medical Staff Made Aware: Yes  End of Session Communication: Bedside nurse  Assessment Comment: Pt presents with impaired mobility and impaired balance. Pt would benefit from therapy for the duration of their stay. Upon discharge pt would benefit from high intensity therapy vs low intensity therapy, depending oh pt's home support, for improved mobility and function. Pt will require 24 hour supervision for safety. Pt is motivated and can tolerate 3 hours of intense short therapy sessions to return to previous high level of function.  End of Session Patient Position: Bed, 3 rail up, Alarm on  IP OR SWING BED PT PLAN  Inpatient or Swing Bed: Inpatient  PT Plan  Treatment/Interventions: Transfer training, Gait training, Stair training, Balance training, Endurance training, Therapeutic exercise, Therapeutic activity  PT Plan: Ongoing PT  PT Frequency: 5 times per week  PT Discharge Recommendations: Low intensity level of continued care, High intensity level of continued care (depending on pt's home support.)  Equipment Recommended upon Discharge: Wheeled walker  PT Recommended Transfer Status: Contact guard  PT - OK to Discharge: Yes (To next level of care when cleared by medical team)    Subjective      Current Problem:  1. Fever, unspecified fever cause        2. Shaking chills          Problem List[1]    General Visit Information:  General: Per EMR, pt presenting with fever and chills.  Patient is good historian she reports she has had ear pain 2 weeks ago treated with antibiotic which is not work she came back to ED on Sunday switch her to cefdinir.  Since last Sunday patient has fever highest temp 102 and chills associated with dizziness.  She reports she still has mild ear pain, she denied seizure, cough, chest pain, shortness of breath, abdominal pain, nausea or vomiting, change in bowel or urinary habits.  Patient reports she started to have difficulty to find words lasting for a while.  Patient currently on chemotherapy supposed to start her last cycle on next Monday.  Patient quit smoking however she is not sure for how long, she denies drinking alcohol.     Of note patient has uterine cancer diagnosed 2023 s/p uterine removal, found to have lung metastasis she is on chemotherapy Pembrolizumab + PACLitaxel / CARBOplatin, 21 Day Cycles followed by Pembrolizumab, 42 Day Cycles - Gyn.  Patient follow with Dr. Palacio  Reason for Referral: Impaired mobility  Referred By: Nidia José  Co-Treatment: OT  Co-Treatment Reason: For pt safety  Prior to Session Communication: Bedside nurse  Patient Position Received: Alarm on (seated EOB)    Home Living:  Home Living  Home Living Comments: Pt lives alone in a house. 3 LUKE with HR. Pt resides on main level. 9 steps with HR to basement, where laundry and grandchildren's play room are. WIS with gbs. Pt has family nearby.    Prior Level of Function:  Prior Function Per Pt/Caregiver Report  Prior Function Comments: IND in ADLs. Pt ambulates using no device but owns cane. Pt reports no falls. Pt drives and shops. Pt works as grade .    Precautions:  Precautions  Medical Precautions: Fall precautions    Vital Signs:     Objective      Pain:  Pain Assessment  Pain Assessment: 0-10  0-10 (Numeric) Pain Score: 0 - No pain    Cognition:  Cognition  Overall Cognitive Status: Within Functional Limits  Orientation Level: Oriented X4    General Assessments:      Activity Tolerance  Endurance: Tolerates 10 - 20 min exercise with multiple rests                 Static Sitting Balance  Static Sitting-Comment/Number of Minutes: Good  Dynamic Sitting Balance  Dynamic Sitting-Comments: Fair +  Static Standing Balance  Static Standing-Comment/Number of Minutes: Fair +  Dynamic Standing Balance  Dynamic Standing-Comments: Fair    Functional Assessments:     Bed Mobility  Bed Mobility:  (NT 2/2 pt seated EOB at start of session)  Transfers  Transfer: Yes  Transfer 1  Trials/Comments 1: Sit <> stand SBA. Sit <> stand toliet transfer CGA (Pt pericare IND. Pt shaky when standing to put on underwear. Pt had LOB near EOB, min A to correct balance.)  Ambulation/Gait Training  Ambulation/Gait Training Performed: Yes  Ambulation/Gait Training 1  Comments/Distance (ft) 1: Pt ambulated 150 ft with CGA using FWW. (NBOS. Poor TULIO clearance. Pt mildly impulsive. Pt demonstrated decreased safety awareness, picking up walker when ambulating and turning.)          Extremity/Trunk Assessments:        RLE   RLE : Within Functional Limits  LLE   LLE : Within Functional Limits    Outcome Measures:     [facility] Basic Mobility  Turning from your back to your side while in a flat bed without using bedrails: A little  Moving from lying on your back to sitting on the side of a flat bed without using bedrails: A little  Moving to and from bed to chair (including a wheelchair): A little  Standing up from a chair using your arms (e.g. wheelchair or bedside chair): A little  To walk in hospital room: A little  Climbing 3-5 steps with railing: A lot  Basic Mobility - Total Score: 17    Goals:  Encounter Problems       Encounter Problems (Active)       PT Problem       Pt will perform all bed  mobility tasks with IND  (Progressing)       Start:  07/11/25    Expected End:  07/25/25            Pt will perform all transfers with Mod I and proper safety mechanics  (Progressing)       Start:  07/11/25    Expected End:  07/25/25            Pt will ambulate 200 ft with Mod I using LRD for improved functional independence  (Progressing)       Start:  07/11/25    Expected End:  07/25/25            Pt will be able to negotiate 3 steps with 1 HR with Mod I  (Progressing)       Start:  07/11/25    Expected End:  07/25/25            Pt will be able to ambulate at least 200 ft with < or equal to 1 rest break while maintaining SpO2 > 90%  (Progressing)       Start:  07/11/25    Expected End:  07/25/25                 Education Documentation  Mobility Training, taught by MAKEDA PowersPT at 7/11/2025  1:41 PM.  Learner: Patient  Readiness: Acceptance  Method: Explanation  Response: Verbalizes Understanding    Education Comments  No comments found.    Completion of this session, clinical decision making, and documentation performed under the supervision/direction of Ivana Clement PT, DPT.   Tad Alanis, SPT             [1]   Patient Active Problem List  Diagnosis    Postmenopausal bleeding    HTN (hypertension)    Gastroesophageal reflux disease    Abnormal EKG    Angina pectoris    Kahn's esophagus    Benign neoplasm of ciliary body    Benign tumor of ciliary body, left    Colon polyp, hyperplastic    Hyperlipemia    Malignant neoplasm of breast    Nuclear senile cataract of left eye    HTN (hypertension), benign    Malignant neoplasm of endometrium (Multi)    Upper respiratory tract infection    Lung nodules    History of malignant neoplasm of breast    Seasonal allergies    Chemotherapy management, encounter for    Fever, unspecified fever cause

## 2025-07-11 NOTE — ASSESSMENT & PLAN NOTE
#Fever in neutropenic pt  #Recent otitis media  #Hx of uterine cancer s/p removal with lung mets s/p radiation, chemotherapy now  Patient presented with fever and chills, highest temp at home 102, she recently has otitis media treated with antibiotic 2 weeks ago however still has ear pain so antibiotic switched 4 days ago to cefdinir.  Patient on chemotherapy last cycle 2 weeks ago supposed to have next cycle on Monday.  Lab remarkable for mild hyponatremia however CBC normal with normal leukocyte 7.4.  EKG reveals sinus tachycardia, no ischemic changes.  Chest x-ray unremarkable   UA: Unremarkable  - Chest/ Abdomen & pelvis CT: No new active disease in the chest, Scattered tiny pulmonary nodules are stable.  No acute findings of the abdomen or pelvis.   - Head CT ( For possible Mets):No acute intracranial pathology. Left frontal/ethmoid/maxillary sinusitis.  Plan:  - Admit for observation  -Add Vanc, Continue cefepime for ear infection, to cover Pseudomonas in setting of immunocompromise and fever (during admission patient continued to have a spike of 38 degree)  -Blood culture( No growth after 1 day)  - HIV non- reactive  -Temperature trend  -Tylenol as needed for fever(Not to be scheduled)   - continued mIVF LR 75mL/hr

## 2025-07-11 NOTE — PROGRESS NOTES
"ID:  Mica Vanessa is a 69 y.o. female on hospital day 2 of admission presenting with Fever, unspecified fever cause.    Subjective   The patient seen and examined this morning at bedside. ON RN notified resident that she was fatigued and has pain, Given OXY and Zofran..  This morning pt denied any pain though mild nausea. A&o x 4, continued to feel fatigued.  .Patient denied HA, dizziness, abdominal pain,  chest pain, N/V, Dysuria, and diarrhea.         Objective     Visit Vitals  /54   Pulse 69   Temp 36.5 °C (97.7 °F) (Temporal)   Resp 20   Ht 1.575 m (5' 2\")   Wt 63.9 kg (140 lb 14.4 oz)   SpO2 97%   BMI 25.77 kg/m²   OB Status Postmenopausal   Smoking Status Former   BSA 1.67 m²        Physical Examination:  General: Appears stated age, thin , A&Ox4, conversational, lying comfortably in bed, not in pain or distress. On RA.  HEENT: Mucous membranes moist.  Head/Neck: Atraumatic, Normocephalic. Neck supple.   Respiratory: equal air entry bilaterally no crackles or wheezing were appreciated.  Cardiovascular: regular rhythm, normal S1 and S2. No added sounds or murmurs,  Gastrointestinal: soft, non-tender abdomen, bowel sounds present, no guarding or rebound.   Extremities: No edema in lower extremities.  Neurological:  no focal neurologic deficits.  Psychological: Appropriate mood, normal affect.      Laboratory Data:    Results from last 7 days   Lab Units 07/11/25  0501 07/10/25  0600 07/09/25  1647   WBC AUTO x10*3/uL 5.5 4.6 7.4   RBC AUTO x10*6/uL 3.18* 3.28* 3.92*   HEMOGLOBIN g/dL 10.3* 10.4* 12.6     Results from last 7 days   Lab Units 07/11/25  0500 07/10/25  0600 07/09/25  1647 07/06/25  0916   SODIUM mmol/L 125* 130* 130* 134*   POTASSIUM mmol/L 3.6 3.4* 3.7 3.6   CHLORIDE mmol/L 93* 99 94* 98   CO2 mmol/L 23 20* 24 28   BUN mg/dL 9 16 19 14   CREATININE mg/dL 0.35* 0.42* 0.65 0.52   CALCIUM mg/dL 8.5* 8.3* 9.4 9.1   PHOSPHORUS mg/dL 2.6 3.1  --   --    MAGNESIUM mg/dL 1.47* 1.66  --   --  "   BILIRUBIN TOTAL mg/dL  --   --  0.7 0.6   ALT U/L  --   --  20 20   AST U/L  --   --  15 15       Imaging:  Imaging  CT head wo IV contrast  Result Date: 7/10/2025  No acute intracranial pathology.   Left frontal/ethmoid/maxillary sinusitis.   Signed by: Fernando Capps 7/10/2025 10:14 AM Dictation workstation:   [id number]    CT chest abdomen pelvis w IV contrast  Result Date: 7/10/2025  CHEST: 1.  No new active disease in the chest. 2. Scattered tiny pulmonary nodules are stable.   ABDOMEN-PELVIS: 1.  No acute findings of the abdomen or pelvis.     Signed by: Fernando Capps 7/10/2025 10:11 AM Dictation workstation:   [id number]    XR chest 1 view  Result Date: 7/9/2025  No acute cardiopulmonary disease. Signed by Juan Diggs      Cardiology, Vascular, and Other Imaging  Electrocardiogram, 12-lead ACS symptoms  Result Date: 7/10/2025  Sinus tachycardia Possible Left atrial enlargement Left anterior fascicular block Anterolateral infarct (cited on or before 16-MAR-2024) Abnormal ECG When compared with ECG of 16-MAR-2024 17:59, Vent. rate has increased BY  64 BPM QRS duration has decreased         Medications:  Scheduled medications  Scheduled Medications[1]  Continuous medications  Continuous Medications[2]  PRN medications  PRN Medications[3]    Assessment    Assessment & Plan   69-year-old female with PMHx of hypertension, hyperlipidemia, Khan's esophagus, breast cancer 2007, uterine cancers/p uterine removal 2023 with lung mets s/p radiation, on chemotherapy now last session 2 weeks ago presenting with fever and chills.  Patient recently diagnosed with ear pain 2 weeks ago, antibiotic switched to cefdinir 4 days ago.  Patient has fever and chills  started 4 days ago, still she has mild ear pain, no drainage.  Patient reports difficulty finding words for a while.  She denies chest pain, shortness of breath, abdominal pain, change in bowel or urinary habits.  Notably patient has uterine cancer diagnosed in 2023  s/p uterine removal now she found to have lung metastasis on chemotherapy next cycle supposed to be on next Monday, patient follow with Dr. Palacio.  Assessment & Plan  Fever, unspecified fever cause  #Fever in neutropenic pt  #Recent otitis media  #Hx of uterine cancer s/p removal with lung mets s/p radiation, chemotherapy now  Patient presented with fever and chills, highest temp at home 102, she recently has otitis media treated with antibiotic 2 weeks ago however still has ear pain so antibiotic switched 4 days ago to cefdinir.  Patient on chemotherapy last cycle 2 weeks ago supposed to have next cycle on Monday.  Lab remarkable for mild hyponatremia however CBC normal with normal leukocyte 7.4.  EKG reveals sinus tachycardia, no ischemic changes.  Chest x-ray unremarkable   UA: Unremarkable  - Chest/ Abdomen & pelvis CT: No new active disease in the chest, Scattered tiny pulmonary nodules are stable.  No acute findings of the abdomen or pelvis.   - Head CT ( For possible Mets):No acute intracranial pathology. Left frontal/ethmoid/maxillary sinusitis.  Plan:  - Admit for observation  -Add Vanc, Continue cefepime for ear infection, to cover Pseudomonas in setting of immunocompromise and fever (during admission patient continued to have a spike of 38 degree)  -Blood culture( No growth after 1 day)  - HIV non- reactive  -Temperature trend  -Tylenol as needed for fever(Not to be scheduled)   - continued mIVF LR 75mL/hr   - ID consulted  - Neuro Consulted for intermittent confusion      Global Plan of Care  Fluid: PRN  Electrolytes: PRN  Nutrition: Regular/ NPO / Cardiac / Diabetic / Renal  DVT Prophylaxis: Lovenox/Heparin/ Eliquis/ None  GI Prophylaxis: None  Code Status: Full Code     Emergency Contact: Extended Emergency Contact Information  Primary Emergency Contact: OtfNicolasa  Address: [address] [place] [address] of [facility]  Home Phone: [phone]  Work Phone:  [phone]  Mobile Phone: [phone]  Relation: Son   needed? No  Secondary Emergency Contact: Paul Vanessa  Address: [id number] [place] [address] [address] MACKEY, [address] of [facility]  Home Phone: [phone]  Work Phone: [phone]  Mobile Phone: [phone]  Relation: Son   needed? No     Patient seen and discussed with the attending.  Signature: Nils Pérez MD, PGY I Internal medicine  Date: July 11, 2025   This note has been transcribed using Dragon voice recognition system and there is a possibility of unintentional typing misprints.  Any information found to be copied from previous providers is done in the best interest of the patient to provide accurate, quality, and continuity of care.           [1] cefepime, 2 g, intravenous, q8h  enoxaparin, 40 mg, subcutaneous, q24h  escitalopram, 10 mg, oral, Daily  lidocaine, 1 patch, transdermal, Daily  lisinopril, 20 mg, oral, Daily before breakfast  vancomycin, 1,250 mg, intravenous, q12h  [2] lactated Ringer's, 75 mL/hr, Last Rate: 75 mL/hr (07/11/25 0005)  lactated Ringer's, 75 mL/hr  [3] PRN medications: acetaminophen, ondansetron, oxyCODONE, prochlorperazine, propranolol, vancomycin

## 2025-07-11 NOTE — PROGRESS NOTES
Occupational Therapy    Evaluation    Patient Name: Mica Vanessa  MRN: [id number]  Today's Date: 7/11/2025  Time Calculation  Start Time: 1131  Stop Time: 1150  Time Calculation (min): 19 min  3127/3127-A  Eval only     Assessment  IP OT Assessment  Prognosis: Good  Medical Staff Made Aware: Yes  End of Session Communication: Bedside nurse  End of Session Patient Position: Bed, 3 rail up, Alarm on  Patient presents with decline in ADLs, functional transfers, and functional mobility tasks and would benefit from OT during acute stay to improve functional independence and safety.  Patient currently requires 24 hour hands on assistance. Recommend high intensity OT vs low intensity with 24 hour supervision to maximize functional independence and safety.      Plan:  Treatment Interventions: ADL retraining, Functional transfer training, Patient/family training, Equipment evaluation/education, Compensatory technique education  OT Frequency: 5 times per week (vs low intensity with 24 hour supervision)  OT Discharge Recommendations: High intensity level of continued care  OT - OK to Discharge: Yes from acute care OT services to the next level of care when cleared by medical team      Subjective   Current Problem:  1. Fever, unspecified fever cause        2. Shaking chills            General:  General  Reason for Referral: ADLs  Referred By: Nidia José DO  Past Medical History Relevant to Rehab: Admitted 7/9/2025 with shaking, and fevers. Alert and oriented x 2. Unspecified cause of fever. PMH: HTN, HLD, breast cancer, uterine cancer, lung mets, on chemo  Co-Treatment: PT  Co-Treatment Reason: for safety  Prior to Session Communication: Bedside nurse  Patient Position Received:  (seated at edge of bed)  Preferred Learning Style: verbal  General Comment: Patient seated at edge of bed and agreeable to participate in OT evaluation.    Precautions:  Medical Precautions: Fall precautions    Pain:  Pain Assessment  Pain  Assessment: 0-10  0-10 (Numeric) Pain Score: 0 - No pain    Objective   Cognition:  Overall Cognitive Status: Within Functional Limits  Insight: Mild  Impulsive: Moderately    Home Living:  Home Living Comments: Lives in 1 story home alone with 3 LUKE with railing. Has 1st floor setup.  Has WIS and tub shower.  Reports 9 steps down to basement with railing.     Prior Function:  Prior Function Comments: Was independent with all ADLs, IADLs and functional  mobility without AD prior to admit. 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    ADL:  Grooming Assistance:  (CGA to wash hands in standing at sink. Max verbal cues and max assist for WW for safe approach to sink.)  LE Dressing Assistance: Minimal (doff and don mesh underwear.  Patient stood prior to instruction and walker placement to add pad to mesh underwear and had anterior loss of balance when placing pad into mesh underwear at side of bed.)  Toileting Assistance with Device: Minimal  Toileting Deficit: Grab bar use    Activity Tolerance:  Endurance: Decreased tolerance for upright activites (Patient with decreased endurance and appeared tremulous after approximately 10 minues of activity)    Bed Mobility/Transfers:   Bed Mobility  Bed Mobility:  (SBA sit to supine)  Transfers  Transfer:  (CGA sit to stand from toilet with use of grab bar and SBA sit <>stand from edge of bed.  Min verbal cues provided for safety and technique.)    Ambulation/Gait Training:  Functional Mobility  Functional Mobility Performed:  (CGA with WW functional mobility task)    Sensation:  Sensation Comment: reports numbness and tingling right foot occasionally and attributes to chemotherapy treatments    Extremities: RUE   RUE : Within Functional Limits and LUE   LUE: Within Functional Limits    Outcome Measures: [facility] Daily Activity  Putting on and taking off regular lower body clothing: A little  Bathing (including washing, rinsing, drying): A lot  Putting on and  taking off regular upper body clothing: A little  Toileting, which includes using toilet, bedpan or urinal: A little  Taking care of personal grooming such as brushing teeth: A little  Eating Meals: None  Daily Activity - Total Score: 18    EDUCATION:  Education  Individual(s) Educated: Patient  Education Provided: Fall precautons  Patient Response to Education: Patient/Caregiver Verbalized Understanding of Information      Goals:   Encounter Problems       Encounter Problems (Active)       Dressings Lower Extremities       STG - Patient will complete lower body dressing with SBA with comp strategies PRN (Progressing)       Start:  07/11/25    Expected End:  07/25/25               Functional Balance       STG-Patient will be SBA with assistive device dynamic stand task >5 minutes for ADL completion   (Progressing)       Start:  07/11/25    Expected End:  07/25/25               Functional Mobility       STG-Patient will be SBA with assistive device functional mobility tasks   (Progressing)       Start:  07/11/25    Expected End:  07/25/25               OT Transfers       STG-Patient will be SBA with functional transfers demonstrating good safety   (Progressing)       Start:  07/11/25    Expected End:  07/25/25               Safety       STG-Patient will independently verbalize and demonstrate EC/WS techniques with all functional tasks  (Progressing)       Start:  07/11/25    Expected End:  07/25/25                  '

## 2025-07-12 ENCOUNTER — HOME HEALTH ADMISSION (OUTPATIENT)
Dept: HOME HEALTH SERVICES | Facility: HOME HEALTH | Age: 70
End: 2025-07-12
Payer: MEDICARE

## 2025-07-12 ENCOUNTER — DOCUMENTATION (OUTPATIENT)
Dept: HOME HEALTH SERVICES | Facility: HOME HEALTH | Age: 70
End: 2025-07-12
Payer: MEDICARE

## 2025-07-12 VITALS
TEMPERATURE: 97.9 F | DIASTOLIC BLOOD PRESSURE: 59 MMHG | OXYGEN SATURATION: 96 % | HEART RATE: 70 BPM | BODY MASS INDEX: 25.93 KG/M2 | RESPIRATION RATE: 12 BRPM | WEIGHT: 140.9 LBS | HEIGHT: 62 IN | SYSTOLIC BLOOD PRESSURE: 113 MMHG

## 2025-07-12 PROBLEM — R50.9 FEVER, UNSPECIFIED FEVER CAUSE: Status: RESOLVED | Noted: 2025-07-09 | Resolved: 2025-07-12

## 2025-07-12 LAB
ALBUMIN SERPL BCP-MCNC: 3.7 G/DL (ref 3.4–5)
ANION GAP SERPL CALC-SCNC: 12 MMOL/L (ref 10–20)
BASOPHILS # BLD AUTO: 0.01 X10*3/UL (ref 0–0.1)
BASOPHILS NFR BLD AUTO: 0.2 %
BUN SERPL-MCNC: 8 MG/DL (ref 6–23)
CALCIUM SERPL-MCNC: 8.8 MG/DL (ref 8.6–10.3)
CHLORIDE SERPL-SCNC: 95 MMOL/L (ref 98–107)
CO2 SERPL-SCNC: 25 MMOL/L (ref 21–32)
CREAT SERPL-MCNC: 0.35 MG/DL (ref 0.5–1.05)
EGFRCR SERPLBLD CKD-EPI 2021: >90 ML/MIN/1.73M*2
EOSINOPHIL # BLD AUTO: 0.01 X10*3/UL (ref 0–0.7)
EOSINOPHIL NFR BLD AUTO: 0.2 %
ERYTHROCYTE [DISTWIDTH] IN BLOOD BY AUTOMATED COUNT: 13.2 % (ref 11.5–14.5)
GLUCOSE SERPL-MCNC: 81 MG/DL (ref 74–99)
HCT VFR BLD AUTO: 30.4 % (ref 36–46)
HGB BLD-MCNC: 10.6 G/DL (ref 12–16)
IMM GRANULOCYTES # BLD AUTO: 0.02 X10*3/UL (ref 0–0.7)
IMM GRANULOCYTES NFR BLD AUTO: 0.4 % (ref 0–0.9)
LYMPHOCYTES # BLD AUTO: 0.51 X10*3/UL (ref 1.2–4.8)
LYMPHOCYTES NFR BLD AUTO: 10.7 %
MAGNESIUM SERPL-MCNC: 1.46 MG/DL (ref 1.6–2.4)
MCH RBC QN AUTO: 32.3 PG (ref 26–34)
MCHC RBC AUTO-ENTMCNC: 34.9 G/DL (ref 32–36)
MCV RBC AUTO: 93 FL (ref 80–100)
MONOCYTES # BLD AUTO: 0.54 X10*3/UL (ref 0.1–1)
MONOCYTES NFR BLD AUTO: 11.4 %
NEUTROPHILS # BLD AUTO: 3.66 X10*3/UL (ref 1.2–7.7)
NEUTROPHILS NFR BLD AUTO: 77.1 %
NRBC BLD-RTO: 0 /100 WBCS (ref 0–0)
PHOSPHATE SERPL-MCNC: 2.9 MG/DL (ref 2.5–4.9)
PLATELET # BLD AUTO: 232 X10*3/UL (ref 150–450)
POTASSIUM SERPL-SCNC: 3.9 MMOL/L (ref 3.5–5.3)
RBC # BLD AUTO: 3.28 X10*6/UL (ref 4–5.2)
SODIUM SERPL-SCNC: 128 MMOL/L (ref 136–145)
WBC # BLD AUTO: 4.8 X10*3/UL (ref 4.4–11.3)

## 2025-07-12 PROCEDURE — 2500000001 HC RX 250 WO HCPCS SELF ADMINISTERED DRUGS (ALT 637 FOR MEDICARE OP)

## 2025-07-12 PROCEDURE — 85025 COMPLETE CBC W/AUTO DIFF WBC: CPT

## 2025-07-12 PROCEDURE — 99239 HOSP IP/OBS DSCHRG MGMT >30: CPT

## 2025-07-12 PROCEDURE — 36415 COLL VENOUS BLD VENIPUNCTURE: CPT

## 2025-07-12 PROCEDURE — 2500000004 HC RX 250 GENERAL PHARMACY W/ HCPCS (ALT 636 FOR OP/ED)

## 2025-07-12 PROCEDURE — 2500000001 HC RX 250 WO HCPCS SELF ADMINISTERED DRUGS (ALT 637 FOR MEDICARE OP): Performed by: INTERNAL MEDICINE

## 2025-07-12 PROCEDURE — 80069 RENAL FUNCTION PANEL: CPT

## 2025-07-12 PROCEDURE — 83735 ASSAY OF MAGNESIUM: CPT

## 2025-07-12 RX ORDER — MAGNESIUM CHLORIDE 64 MG
128 TABLET, DELAYED RELEASE (ENTERIC COATED) ORAL ONCE
Status: COMPLETED | OUTPATIENT
Start: 2025-07-12 | End: 2025-07-12

## 2025-07-12 RX ORDER — AMOXICILLIN AND CLAVULANATE POTASSIUM 875; 125 MG/1; MG/1
1 TABLET, FILM COATED ORAL 2 TIMES DAILY
Qty: 42 TABLET | Refills: 0 | Status: SHIPPED | OUTPATIENT
Start: 2025-07-12 | End: 2025-08-02

## 2025-07-12 RX ORDER — AMOXICILLIN AND CLAVULANATE POTASSIUM 875; 125 MG/1; MG/1
1 TABLET, FILM COATED ORAL EVERY 12 HOURS SCHEDULED
Status: DISCONTINUED | OUTPATIENT
Start: 2025-07-12 | End: 2025-07-12

## 2025-07-12 RX ADMIN — LISINOPRIL 20 MG: 20 TABLET ORAL at 06:13

## 2025-07-12 RX ADMIN — AMOXICILLIN AND CLAVULANATE POTASSIUM 1 TABLET: 875; 125 TABLET, FILM COATED ORAL at 09:06

## 2025-07-12 RX ADMIN — FLUTICASONE PROPIONATE 2 SPRAY: 50 SPRAY, METERED NASAL at 09:06

## 2025-07-12 RX ADMIN — MAGNESIUM 64 MG (MAGNESIUM CHLORIDE) TABLET,DELAYED RELEASE 2 TABLET: at 09:12

## 2025-07-12 RX ADMIN — SODIUM CHLORIDE, SODIUM LACTATE, POTASSIUM CHLORIDE, AND CALCIUM CHLORIDE 75 ML/HR: .6; .31; .03; .02 INJECTION, SOLUTION INTRAVENOUS at 03:00

## 2025-07-12 ASSESSMENT — PAIN SCALES - GENERAL
PAINLEVEL_OUTOF10: 3
PAINLEVEL_OUTOF10: 3

## 2025-07-12 ASSESSMENT — PAIN - FUNCTIONAL ASSESSMENT
PAIN_FUNCTIONAL_ASSESSMENT: 0-10
PAIN_FUNCTIONAL_ASSESSMENT: 0-10

## 2025-07-12 NOTE — DISCHARGE INSTRUCTIONS
You were admitted for fever and confusion. While you were here we treated you with broad-spectrum antibiotics to cover for infection.  You were evaluated by neurology and infectious disease on the hospital.  Your imaging did not show any signs of stroke but did show a severe sinus infection.  You were recommended to take Augmentin for 3 weeks and follow-up with ENT as well as infectious disease in 3 weeks.  Please go to your oncology appointment as scheduled.  You should also follow up with your PCP for a visit as soon as possible or within one (1) week.      Med changes:  Please start taking Augmentin twice a day for the next 3 weeks (stop taking on 8/2).  Take this medication with food to help prevent stomach upset.  Please take all of your medications as directed on your AVS.    Please go to the lab for a repeat BMP to check your sodium level in one week.     If you have any concerning symptoms, or worsening symptoms please call or return, such as chest pain, shortness of breath, new onset confusion, loss of sensation, or fever >103F.  Thank you for allowing us to participate in your health care!    -[facility] Inpatient Medicine Teaching Service.

## 2025-07-12 NOTE — HH CARE COORDINATION
Home Care received a Referral for Physical Therapy, Occupational Therapy, and Home Health Aide. We have processed the referral for a Start of Care on 7/13-7/14/25.     If you have any questions or concerns, please feel free to contact us at [phone]. Follow the prompts, enter your five digit zip code, and you will be directed to your care team on WEST 2.

## 2025-07-12 NOTE — DISCHARGE SUMMARY
Discharge Diagnosis  Fever, unspecified fever cause  Hx of OM           Issues Requiring Follow-Up  -patient to follow-up with ID, Dr. Hendricks after 3 weeks  - Patient to follow-up with ENT   - Patient was placed on Augmentin for 3 weeks  - Patient will benefit from dietitian appointment due to her suppressed appetite.    Discharge Meds     Medication List      CHANGE how you take these medications     * amoxicillin-clavulanate 875-125 mg tablet; Commonly known as:   Augmentin; Take 1 tablet by mouth 2 times a day for 21 days.; What   changed: Another medication with the same name was added. Make sure you   understand how and when to take each.   * amoxicillin-clavulanate 875-125 mg tablet; Commonly known as:   Augmentin; Take 1 tablet by mouth 2 times a day for 21 days.; What   changed: You were already taking a medication with the same name, and this   prescription was added. Make sure you understand how and when to take   each.  * This list has 2 medication(s) that are the same as other medications   prescribed for you. Read the directions carefully, and ask your doctor or   other care provider to review them with you.     CONTINUE taking these medications     escitalopram 10 mg tablet; Commonly known as: Lexapro; Take 1 tablet (10   mg) by mouth once daily.   lisinopril 20 mg tablet   loratadine 10 mg tablet; Commonly known as: Claritin   LORazepam 0.5 mg tablet; Commonly known as: Ativan; Take 1 tablet (0.5   mg) by mouth every 8 hours if needed for anxiety for up to 10 days.   magnesium gluconate 500 mg (27 mg elemental) tablet; Commonly known as:   Magonate; Take 2 tablets (54 mg) by mouth 2 times a day.   ondansetron 8 mg tablet; Commonly known as: Zofran; Take 1 tablet (8 mg)   by mouth every 8 hours if needed for nausea or vomiting.   prochlorperazine 10 mg tablet; Commonly known as: Compazine; Take 1   tablet (10 mg) by mouth every 6 hours if needed for nausea or vomiting.   propranolol 10 mg tablet;  Commonly known as: Inderal   senna 8.6 mg capsule; Generic drug: sennosides; Take 1 capsule (8.6 mg)   by mouth once daily at bedtime.     STOP taking these medications     cefdinir 300 mg capsule; Commonly known as: Omnicef   dexAMETHasone 4 mg tablet; Commonly known as: Decadron       Test Results Pending At Discharge  Pending Labs       Order Current Status    Blood Culture Preliminary result    Blood Culture Preliminary result            Hospital Course  This is a pleasant 69-year-old female with a PMHx of hypertension, hyperlipidemia, Khan's esophagus, breast cancer 2007, uterine cancers/p uterine removal 2023 with lung mets s/p radiation, on chemotherapy (last treatment approximately 2 weeks ago) who presented for fever, Tmax 102 with chills and dizziness, fatigue.  In the ER she was tachycardic to 112 but afebrile and otherwise normal vitals.  Labs were significant for hyponatremia 130 hypochloremia 94 but no leukocytosis or leukopenia.  Lactate and troponin were normal.  Blood cultures were obtained, she received cefepime, and her primary oncologist recommended admission for observation.  Admission was complicated by intermittent confusion/delirium most prominently with word finding difficulty.  She underwent CT scans of her head, chest abdomen pelvis which showed sinusitis but otherwise no evidence of infection.  She was evaluated by infectious disease who recommended 3 weeks of Augmentin upon discharge and neurology who felt her neurosymptoms were more related to metabolic encephalopathy and CNS infection or neuropathology.  Hospital course was complicated by worsening hyponatremia to 125 in the setting of poor appetite which did improve with fluid resuscitation.  patient was instructed to follow ID outpatient due to her prescribed antibiotics.  Patient was also instructed to follow-up with ENT due to her recent otitis media diagnosis.   An order for home air was placed and we think patient may benefit  from home aide since she is homebound.     Pertinent Physical Exam At Time of Discharge  Physical Exam  General: Appears stated age, thin , A&Ox4, conversational, lying comfortably in bed, not in pain or distress. On RA.  HEENT: Mucous membranes moist.  Head/Neck: Atraumatic, Normocephalic. Neck supple.   Respiratory: equal air entry bilaterally no crackles or wheezing were appreciated.  Cardiovascular: regular rhythm, normal S1 and S2. No added sounds or murmurs,  Gastrointestinal: soft, non-tender abdomen, bowel sounds present, no guarding or rebound.   Extremities: No edema in lower extremities.  Neurological:  no focal neurologic deficits.  Psychological: Appropriate mood, normal affect.  Outpatient Follow-Up  Future Appointments   Date Time Provider Department Center   7/14/2025  8:40 AM MD BILLY [facility] [place]   7/14/2025  9:00 [facility] [place]   8/4/2025  1:40 PM Kristin Soares, APRN-CNP [facility] [place]   8/4/2025  2:15 [facility] [place]   9/15/2025  1:40 PM MD [facility] [place]   9/15/2025  2:15 [facility] [place]         Nils Pérez MD

## 2025-07-12 NOTE — CARE PLAN
"The patient's goals for the shift include \"to go home.\"    The clinical goals for the shift include Patient will remain afebrile through end of shift.    Patient remained afebrile this shift and will discharge to home this afternoon.  "

## 2025-07-12 NOTE — CARE PLAN
The patient's goals for the shift include  sleep    The clinical goals for the shift include pt will remain afebrile throughout the shift    Problem: Pain - Adult  Goal: Verbalizes/displays adequate comfort level or baseline comfort level  Outcome: Progressing     Problem: Safety - Adult  Goal: Free from fall injury  Outcome: Progressing     Problem: Fall/Injury  Goal: Not fall by end of shift  Outcome: Progressing

## 2025-07-12 NOTE — NURSING NOTE
Patient discharged to home via wheelchair with son to transport via private car. Patient is aware that she has scripts that she will need to  from her pharmacy and is also aware of follow-up appointments that she needs to schedule.

## 2025-07-13 LAB
BACTERIA BLD CULT: NORMAL
BACTERIA BLD CULT: NORMAL

## 2025-07-14 ENCOUNTER — OFFICE VISIT (OUTPATIENT)
Dept: GYNECOLOGIC ONCOLOGY | Facility: CLINIC | Age: 70
End: 2025-07-14
Payer: MEDICARE

## 2025-07-14 ENCOUNTER — INFUSION (OUTPATIENT)
Dept: HEMATOLOGY/ONCOLOGY | Facility: CLINIC | Age: 70
End: 2025-07-14
Payer: MEDICARE

## 2025-07-14 ENCOUNTER — TELEPHONE (OUTPATIENT)
Dept: GYNECOLOGIC ONCOLOGY | Facility: HOSPITAL | Age: 70
End: 2025-07-14

## 2025-07-14 VITALS
WEIGHT: 130.95 LBS | OXYGEN SATURATION: 94 % | BODY MASS INDEX: 23.95 KG/M2 | SYSTOLIC BLOOD PRESSURE: 103 MMHG | TEMPERATURE: 97 F | RESPIRATION RATE: 16 BRPM | DIASTOLIC BLOOD PRESSURE: 67 MMHG | HEART RATE: 94 BPM

## 2025-07-14 DIAGNOSIS — C54.1 MALIGNANT NEOPLASM OF ENDOMETRIUM (MULTI): ICD-10-CM

## 2025-07-14 DIAGNOSIS — Z51.11 CHEMOTHERAPY MANAGEMENT, ENCOUNTER FOR: Primary | ICD-10-CM

## 2025-07-14 LAB
BACTERIA BLD CULT: NORMAL
BACTERIA BLD CULT: NORMAL

## 2025-07-14 PROCEDURE — 1126F AMNT PAIN NOTED NONE PRSNT: CPT | Performed by: OBSTETRICS & GYNECOLOGY

## 2025-07-14 PROCEDURE — 1036F TOBACCO NON-USER: CPT | Performed by: OBSTETRICS & GYNECOLOGY

## 2025-07-14 PROCEDURE — 3074F SYST BP LT 130 MM HG: CPT | Performed by: OBSTETRICS & GYNECOLOGY

## 2025-07-14 PROCEDURE — G2211 COMPLEX E/M VISIT ADD ON: HCPCS | Performed by: OBSTETRICS & GYNECOLOGY

## 2025-07-14 PROCEDURE — 3078F DIAST BP <80 MM HG: CPT | Performed by: OBSTETRICS & GYNECOLOGY

## 2025-07-14 PROCEDURE — 99215 OFFICE O/P EST HI 40 MIN: CPT | Performed by: OBSTETRICS & GYNECOLOGY

## 2025-07-14 PROCEDURE — 1159F MED LIST DOCD IN RCRD: CPT | Performed by: OBSTETRICS & GYNECOLOGY

## 2025-07-14 PROCEDURE — 1111F DSCHRG MED/CURRENT MED MERGE: CPT | Performed by: OBSTETRICS & GYNECOLOGY

## 2025-07-14 PROCEDURE — 1160F RVW MEDS BY RX/DR IN RCRD: CPT | Performed by: OBSTETRICS & GYNECOLOGY

## 2025-07-14 RX ORDER — PROCHLORPERAZINE MALEATE 10 MG
10 TABLET ORAL EVERY 6 HOURS PRN
OUTPATIENT
Start: 2025-07-22

## 2025-07-14 RX ORDER — DIPHENHYDRAMINE HCL 25 MG
50 CAPSULE ORAL ONCE
OUTPATIENT
Start: 2025-07-22

## 2025-07-14 RX ORDER — FAMOTIDINE 10 MG/ML
20 INJECTION, SOLUTION INTRAVENOUS ONCE
OUTPATIENT
Start: 2025-07-22

## 2025-07-14 RX ORDER — DIPHENHYDRAMINE HYDROCHLORIDE 50 MG/ML
50 INJECTION, SOLUTION INTRAMUSCULAR; INTRAVENOUS AS NEEDED
OUTPATIENT
Start: 2025-07-22

## 2025-07-14 RX ORDER — PALONOSETRON 0.05 MG/ML
0.25 INJECTION, SOLUTION INTRAVENOUS ONCE
OUTPATIENT
Start: 2025-07-22

## 2025-07-14 RX ORDER — FAMOTIDINE 10 MG/ML
20 INJECTION, SOLUTION INTRAVENOUS ONCE AS NEEDED
OUTPATIENT
Start: 2025-07-22

## 2025-07-14 RX ORDER — EPINEPHRINE 0.3 MG/.3ML
0.3 INJECTION SUBCUTANEOUS EVERY 5 MIN PRN
OUTPATIENT
Start: 2025-07-22

## 2025-07-14 RX ORDER — ALBUTEROL SULFATE 0.83 MG/ML
3 SOLUTION RESPIRATORY (INHALATION) AS NEEDED
OUTPATIENT
Start: 2025-07-22

## 2025-07-14 RX ORDER — PROCHLORPERAZINE EDISYLATE 5 MG/ML
10 INJECTION INTRAMUSCULAR; INTRAVENOUS EVERY 6 HOURS PRN
OUTPATIENT
Start: 2025-07-22

## 2025-07-14 ASSESSMENT — PAIN SCALES - GENERAL: PAINLEVEL_OUTOF10: 0-NO PAIN

## 2025-07-14 NOTE — PROGRESS NOTES
Patient ID: Mica Vanessa is a 69 y.o. female.  Referring Physician: No referring provider defined for this encounter.  Primary Care Provider: Joi Valle,     Subjective      Mica is doing ok today. Recent hospitalization due to fever of unknown origin, all cultures negative, thought maybe sinus infection. Discharged home on 3 weeks of augmentin. Sinus pressure and drainage have improved. Had a cough that is also improving now. No more fever or chills. No CP or SOB. No abdominal pain, nausea or vomiting.     A comprehensive review of systems was performed and otherwise negative.         Objective    BSA: 1.61 meters squared  /67 (BP Location: Right arm, Patient Position: Sitting, BP Cuff Size: Adult)   Pulse 94   Temp 36.1 °C (97 °F) (Temporal)   Resp 16   Wt 59.4 kg (130 lb 15.3 oz)   SpO2 94%   BMI 23.95 kg/m²      Physical Exam  HENT:      Head: Normocephalic.      Right Ear: Tympanic membrane normal.      Left Ear: Tympanic membrane normal.      Nose: Nose normal.      Mouth/Throat:      Mouth: Mucous membranes are dry.   Cardiovascular:      Rate and Rhythm: Normal rate and regular rhythm.   Pulmonary:      Effort: Pulmonary effort is normal.      Breath sounds: Normal breath sounds.   Abdominal:      General: Abdomen is flat.      Palpations: Abdomen is soft.   Musculoskeletal:      Cervical back: Neck supple.   Skin:     General: Skin is warm.   Neurological:      General: No focal deficit present.      Mental Status: She is alert.   Psychiatric:         Mood and Affect: Mood normal.         Performance Status:  Symptomatic; fully ambulatory    Assessment/Plan     Oncology History Overview Note   Cancer history:  10/6/23 EMB w/ uterine cancer, mesonephric type  10/21/23 Total laparoscopic hysterectomy bilateral salpingo oophorectomy, Bilateral sentinel lymph node injection and mapping, Left pelvic sentinel lymph node dissection and right complete pelvic lymphadenectomy, Cystourethroscopy.   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 MMRp, P53 alison type. TB recommendations for radiation oncology consult, genetics referral, consider systemic therapy  Adjuvant pelvic RT  7/2024 - new pulmonary nodules, all less than 1cm  10/2024 CT chest stable  3/2025 biopsy of lung nodule - c/w metastatic uterine cancer  3/2025 carbo/taxol/pembro     Malignant neoplasm of endometrium (Multi)   11/10/2023 Initial Diagnosis    Malignant neoplasm of endometrium (Multi)     3/31/2025 -  Chemotherapy    Pembrolizumab + PACLitaxel / CARBOplatin, 21 Day Cycles followed by Pembrolizumab, 42 Day Cycles - Gyn           Problem List Items Addressed This Visit           ICD-10-CM    Malignant neoplasm of endometrium (Multi) C54.1    Chemotherapy management, encounter for - Primary Z51.11       Treatment Plans       Name Type Plan Dates Plan Provider         Active    Pembrolizumab + PACLitaxel / CARBOplatin, 21 Day Cycles followed by Pembrolizumab, 42 Day Cycles - Gyn  Oncology Treatment 3/28/2025 - Present Nannetet Palacio MD                    69y with stage IB mesonephric like endometrial cancer here for consideration of cycle#6 carbo/taxol/pembro.     Endometrial cancer  -reviewed recent hospitalization labs and imaging  -recommend delaying treatment for 1 week, pt amenable   -will plan on repeat labs this week, then treatment 7/21  -will defer CT after this cycle as had in ED  -discussed transition to single agent Pembro after this cycle  Ear/Sinus infection   -on 3wk course of Augmentin, improving  -encouraged to schedule follow-up with ENT as outpatient    Dispo: follow-up in 1 week for cycle #6     Patient seen and discussed with Dr. Palacio.     Eda Munguia MD  Gynecologic Oncology Fellow    I saw and evaluated the patient. I personally obtained the key and critical portions of the history and physical exam or was physically present for key and critical portions performed by  the resident/fellow. I reviewed the resident/fellow's documentation and discussed the patient with the resident/fellow. I agree with the resident/fellow's medical decision making as documented in the note.

## 2025-07-14 NOTE — TELEPHONE ENCOUNTER
Patient was seen in clinic and per Dr. Palacio, treatment will be postponed until next week due to recent hospitalization.  Per charge nurse Isabela, infusion scheduled for 7/22 @ 8:00am.  Call to patient and she was notified.  She will get pretreatment labs done on Monday.  Patient verbalized understanding and agreement regarding discussed information via verbal feedback.

## 2025-07-15 ENCOUNTER — HOME CARE VISIT (OUTPATIENT)
Dept: HOME HEALTH SERVICES | Facility: HOME HEALTH | Age: 70
End: 2025-07-15
Payer: MEDICARE

## 2025-07-15 PROCEDURE — G0152 HHCP-SERV OF OT,EA 15 MIN: HCPCS | Mod: HHH | Performed by: OCCUPATIONAL THERAPIST

## 2025-07-15 PROCEDURE — G0151 HHCP-SERV OF PT,EA 15 MIN: HCPCS | Mod: HHH

## 2025-07-15 PROCEDURE — 169592 NO-PAY CLAIM PROCEDURE

## 2025-07-15 ASSESSMENT — ACTIVITIES OF DAILY LIVING (ADL)
TOILETING: 1
AMBULATION ASSISTANCE: SUPERVISION
LAUNDRY ASSESSED: 1
TOILETING: INDEPENDENT
PREPARING MEALS: NEEDS ASSISTANCE
DRESSING_UB_CURRENT_FUNCTION: INDEPENDENT
OASIS_M1830: 03
BATHING ASSESSED: 1
CURRENT_FUNCTION: SUPERVISION
AMBULATION ASSISTANCE: STAND BY ASSIST
BATHING_CURRENT_FUNCTION: SUPERVISION
AMBULATION ASSISTANCE ON UNEVEN SURFACES: 1
AMBULATION ASSISTANCE: 1
AMBULATION ASSISTANCE ON FLAT SURFACES: 1
LAUNDRY: NEEDS ASSISTANCE
AMBULATION_DISTANCE/DURATION_TOLERATED: 75
PHYSICAL TRANSFERS ASSESSED: 1
CURRENT_FUNCTION: STAND BY ASSIST
DRESSING_LB_CURRENT_FUNCTION: INDEPENDENT
ENTERING_EXITING_HOME: STAND BY ASSIST

## 2025-07-15 ASSESSMENT — ENCOUNTER SYMPTOMS
PAIN LOCATION - PAIN DURATION: DURING ACTIVITY
PAIN LOCATION - EXACERBATING FACTORS: PROLONGED STANDING, WALKING
PAIN LOCATION - RELIEVING FACTORS: RESTING
SUBJECTIVE PAIN PROGRESSION: WAXING AND WANING
OCCASIONAL FEELINGS OF UNSTEADINESS: 1
PAIN LOCATION - PAIN QUALITY: SORENESS
PERSON REPORTING PAIN: PATIENT
PAIN: 1
PAIN LOCATION - PAIN FREQUENCY: INTERMITTENT
PAIN LOCATION: BACK
DENIES PAIN: 1

## 2025-07-16 ASSESSMENT — ENCOUNTER SYMPTOMS
ANGER WITHIN DEFINED LIMITS: 1
AGGRESSION WITHIN DEFINED LIMITS: 1
SLEEP QUALITY: FAIR

## 2025-07-17 ENCOUNTER — HOME CARE VISIT (OUTPATIENT)
Dept: HOME HEALTH SERVICES | Facility: HOME HEALTH | Age: 70
End: 2025-07-17
Payer: MEDICARE

## 2025-07-18 ENCOUNTER — APPOINTMENT (OUTPATIENT)
Dept: PRIMARY CARE | Facility: CLINIC | Age: 70
End: 2025-07-18
Payer: MEDICARE

## 2025-07-18 ENCOUNTER — TELEPHONE (OUTPATIENT)
Dept: GYNECOLOGIC ONCOLOGY | Facility: HOSPITAL | Age: 70
End: 2025-07-18

## 2025-07-18 NOTE — TELEPHONE ENCOUNTER
Received message from patient's son Jamshid that patient is admitted at OSH ([facility]) for confusion.  LM with patient's son that he should call the office with an update if possible.  Treatment scheduled for 7/22.  Likely will need to be rescheduled if patient still admitted.

## 2025-07-19 DIAGNOSIS — E87.1 HYPONATREMIA: ICD-10-CM

## 2025-07-21 ENCOUNTER — TELEPHONE (OUTPATIENT)
Dept: GYNECOLOGIC ONCOLOGY | Facility: HOSPITAL | Age: 70
End: 2025-07-21

## 2025-07-21 ENCOUNTER — LAB (OUTPATIENT)
Dept: LAB | Facility: CLINIC | Age: 70
End: 2025-07-21
Payer: MEDICARE

## 2025-07-21 ENCOUNTER — APPOINTMENT (OUTPATIENT)
Dept: PRIMARY CARE | Facility: CLINIC | Age: 70
End: 2025-07-21
Payer: MEDICARE

## 2025-07-21 ENCOUNTER — TELEMEDICINE (OUTPATIENT)
Dept: GYNECOLOGIC ONCOLOGY | Facility: CLINIC | Age: 70
End: 2025-07-21
Payer: MEDICARE

## 2025-07-21 VITALS
HEIGHT: 62 IN | WEIGHT: 126 LBS | RESPIRATION RATE: 16 BRPM | HEART RATE: 86 BPM | SYSTOLIC BLOOD PRESSURE: 102 MMHG | OXYGEN SATURATION: 98 % | DIASTOLIC BLOOD PRESSURE: 66 MMHG | BODY MASS INDEX: 23.19 KG/M2 | TEMPERATURE: 97.5 F

## 2025-07-21 DIAGNOSIS — Z51.11 CHEMOTHERAPY MANAGEMENT, ENCOUNTER FOR: ICD-10-CM

## 2025-07-21 DIAGNOSIS — C54.1 MALIGNANT NEOPLASM OF ENDOMETRIUM (MULTI): ICD-10-CM

## 2025-07-21 DIAGNOSIS — I10 HYPERTENSION, BENIGN: ICD-10-CM

## 2025-07-21 DIAGNOSIS — J32.0 MAXILLARY SINUSITIS, UNSPECIFIED CHRONICITY: ICD-10-CM

## 2025-07-21 DIAGNOSIS — E87.1 HYPONATREMIA: Primary | ICD-10-CM

## 2025-07-21 DIAGNOSIS — G93.41 ACUTE METABOLIC ENCEPHALOPATHY: Primary | ICD-10-CM

## 2025-07-21 DIAGNOSIS — R53.83 OTHER FATIGUE: ICD-10-CM

## 2025-07-21 DIAGNOSIS — E87.1 HYPONATREMIA: ICD-10-CM

## 2025-07-21 LAB
ALBUMIN SERPL BCP-MCNC: 4.3 G/DL (ref 3.4–5)
ALP SERPL-CCNC: 59 U/L (ref 33–136)
ALT SERPL W P-5'-P-CCNC: 18 U/L (ref 7–45)
ANION GAP SERPL CALC-SCNC: 12 MMOL/L (ref 10–20)
AST SERPL W P-5'-P-CCNC: 13 U/L (ref 9–39)
BASOPHILS # BLD AUTO: 0.01 X10*3/UL (ref 0–0.1)
BASOPHILS NFR BLD AUTO: 0.2 %
BILIRUB SERPL-MCNC: 0.5 MG/DL (ref 0–1.2)
BUN SERPL-MCNC: 15 MG/DL (ref 6–23)
CALCIUM SERPL-MCNC: 9.4 MG/DL (ref 8.6–10.3)
CANCER AG125 SERPL-ACNC: 19.7 U/ML (ref 0–30.2)
CHLORIDE SERPL-SCNC: 93 MMOL/L (ref 98–107)
CO2 SERPL-SCNC: 24 MMOL/L (ref 21–32)
CORTIS AM PEAK SERPL-MSCNC: 26.4 UG/DL (ref 5–20)
CREAT SERPL-MCNC: 0.44 MG/DL (ref 0.5–1.05)
EGFRCR SERPLBLD CKD-EPI 2021: >90 ML/MIN/1.73M*2
EOSINOPHIL # BLD AUTO: 0.17 X10*3/UL (ref 0–0.7)
EOSINOPHIL NFR BLD AUTO: 3.4 %
ERYTHROCYTE [DISTWIDTH] IN BLOOD BY AUTOMATED COUNT: 13 % (ref 11.5–14.5)
GLUCOSE SERPL-MCNC: 154 MG/DL (ref 74–99)
HCT VFR BLD AUTO: 37.5 % (ref 36–46)
HGB BLD-MCNC: 13.1 G/DL (ref 12–16)
IMM GRANULOCYTES # BLD AUTO: 0.01 X10*3/UL (ref 0–0.7)
IMM GRANULOCYTES NFR BLD AUTO: 0.2 % (ref 0–0.9)
LYMPHOCYTES # BLD AUTO: 0.63 X10*3/UL (ref 1.2–4.8)
LYMPHOCYTES NFR BLD AUTO: 12.7 %
MAGNESIUM SERPL-MCNC: 1.4 MG/DL (ref 1.6–2.4)
MCH RBC QN AUTO: 32.6 PG (ref 26–34)
MCHC RBC AUTO-ENTMCNC: 34.9 G/DL (ref 32–36)
MCV RBC AUTO: 93 FL (ref 80–100)
MONOCYTES # BLD AUTO: 0.46 X10*3/UL (ref 0.1–1)
MONOCYTES NFR BLD AUTO: 9.2 %
NEUTROPHILS # BLD AUTO: 3.7 X10*3/UL (ref 1.2–7.7)
NEUTROPHILS NFR BLD AUTO: 74.3 %
NRBC BLD-RTO: ABNORMAL /100{WBCS}
PLATELET # BLD AUTO: 236 X10*3/UL (ref 150–450)
POTASSIUM SERPL-SCNC: 3.9 MMOL/L (ref 3.5–5.3)
PROT SERPL-MCNC: 6.2 G/DL (ref 6.4–8.2)
RBC # BLD AUTO: 4.02 X10*6/UL (ref 4–5.2)
SODIUM SERPL-SCNC: 125 MMOL/L (ref 136–145)
WBC # BLD AUTO: 5 X10*3/UL (ref 4.4–11.3)

## 2025-07-21 PROCEDURE — 85025 COMPLETE CBC W/AUTO DIFF WBC: CPT

## 2025-07-21 PROCEDURE — 80053 COMPREHEN METABOLIC PANEL: CPT

## 2025-07-21 PROCEDURE — 83735 ASSAY OF MAGNESIUM: CPT

## 2025-07-21 PROCEDURE — 3074F SYST BP LT 130 MM HG: CPT

## 2025-07-21 PROCEDURE — 99496 TRANSJ CARE MGMT HIGH F2F 7D: CPT

## 2025-07-21 PROCEDURE — 1111F DSCHRG MED/CURRENT MED MERGE: CPT

## 2025-07-21 PROCEDURE — 1111F DSCHRG MED/CURRENT MED MERGE: CPT | Performed by: OBSTETRICS & GYNECOLOGY

## 2025-07-21 PROCEDURE — 86304 IMMUNOASSAY TUMOR CA 125: CPT

## 2025-07-21 PROCEDURE — 3008F BODY MASS INDEX DOCD: CPT

## 2025-07-21 PROCEDURE — 82533 TOTAL CORTISOL: CPT

## 2025-07-21 PROCEDURE — 1159F MED LIST DOCD IN RCRD: CPT

## 2025-07-21 PROCEDURE — 3078F DIAST BP <80 MM HG: CPT

## 2025-07-21 PROCEDURE — 36415 COLL VENOUS BLD VENIPUNCTURE: CPT

## 2025-07-21 PROCEDURE — 99212 OFFICE O/P EST SF 10 MIN: CPT | Performed by: OBSTETRICS & GYNECOLOGY

## 2025-07-21 PROCEDURE — G2211 COMPLEX E/M VISIT ADD ON: HCPCS | Performed by: OBSTETRICS & GYNECOLOGY

## 2025-07-21 RX ORDER — SODIUM CHLORIDE 1000 MG
1 TABLET, SOLUBLE MISCELLANEOUS 2 TIMES DAILY
Qty: 60 TABLET | Refills: 2 | Status: SHIPPED | OUTPATIENT
Start: 2025-07-21 | End: 2025-10-19

## 2025-07-21 RX ORDER — LISINOPRIL 10 MG/1
10 TABLET ORAL DAILY
Qty: 100 TABLET | Refills: 3 | Status: SHIPPED | OUTPATIENT
Start: 2025-07-21 | End: 2026-08-25

## 2025-07-21 RX ORDER — SODIUM CHLORIDE 1000 MG
1 TABLET, SOLUBLE MISCELLANEOUS DAILY
Qty: 30 TABLET | Refills: 2 | Status: SHIPPED | OUTPATIENT
Start: 2025-07-21 | End: 2025-07-21

## 2025-07-21 NOTE — TELEPHONE ENCOUNTER
Called and spoke to patient's daughter again, Janet, and advised her that Dr. Palacio recommends that patient go to the ED due to last documented sodium of 123. She states that since patient is A&O x 3, she will not want to go to the ED as she feels well. She is currently eating for the first time today, she is amenable to getting STAT labs done at the [facility] outpatient lab. Dr. Palacio notified and aware. Lab orders for STAT labs placed. Dr. Palacio will contact them later today.

## 2025-07-21 NOTE — PROGRESS NOTES
Subjective   Mica Vanessa is a 69 y.o. female who presents for Hospital Follow-up.  HPI  Patient is a 69-year-old female with past medical history of hypertension, hyperlipidemia, breast cancer 2007, metastatic endometrial cancer on carbo/taxol/pembro presenting for hospital follow-up. Patient was initially hospitalized 7/9 - 7/12 for fever of unknown etiology, was presumed to have sinusitis and prescribed a 3-week course of Augmentin.  Patient has since not had any sinus symptoms, no repeat fever or sick symptoms.  Patient had not yet followed up with ID in the outpatient setting or ENT for sinusitis. Patient was readmitted 7/16 - 7/20 for altered mental status.  At the time of admission, patient and patient's daughter state the patient was confused, unable to answer questions, disoriented.  Patient was found to be hyponatremic at that time.  During hospital course, lab work showed serum osmolality was slightly decreased, however urine osmolality was normal.  Inpatient provider did not think SIADH was because of patient's hyponatremia.     Patient states that since discharge from the hospital, she feels somewhat fatigued however is not having any altered mental status.  Occasionally feels lightheaded. is alert and oriented, responding appropriately.  This is corroborated with patient's daughter as well.  Not having any other acute neurosymptoms.  Not having any other sick symptoms, chest pain, shortness of breath, fever, chills.  Patient is to see oncology tomorrow, planning for another round of chemotherapy.  Patient will discuss with oncology if her chemotherapy regimen could be contributory to hyponatremia.  Patient states that overall she has had decreased intake over the past month, decreased appetite.  Denies any overt nausea, no emesis.  Has been trying to supplement with protein drinks.  Patient has been taking salt tablets for the past 3 days.     Objective     Visit Vitals  /66 (BP Location: Left  arm, Patient Position: Sitting, BP Cuff Size: Adult)   Pulse 86   Temp 36.4 °C (97.5 °F)   Resp 16      Physical Exam  Constitutional:       General: She is not in acute distress.     Appearance: Normal appearance. She is not ill-appearing, toxic-appearing or diaphoretic.   HENT:      Head: Normocephalic and atraumatic.      Nose: No congestion or rhinorrhea.     Cardiovascular:      Rate and Rhythm: Normal rate and regular rhythm.   Pulmonary:      Effort: Pulmonary effort is normal. No respiratory distress.      Breath sounds: Normal breath sounds. No wheezing.   Abdominal:      Tenderness: There is no abdominal tenderness. There is no guarding or rebound.     Musculoskeletal:      Cervical back: Neck supple.      Right lower leg: No edema.      Left lower leg: No edema.   Lymphadenopathy:      Cervical: No cervical adenopathy.     Skin:     General: Skin is warm and dry.     Neurological:      General: No focal deficit present.      Mental Status: She is alert and oriented to person, place, and time.      Motor: No weakness.      Gait: Gait normal.     Psychiatric:         Mood and Affect: Mood normal.         Behavior: Behavior normal.         Assessment/Plan   This is a 69-year-old female PMH hypertension, hyperlipidemia, breast cancer 2007, metastatic endometrial cancer currently being treated on chemotherapy presenting for hospital follow-up.  Admitted 7/16 - 7/20 for AMS and was found to have significant hyponatremia of undetermined etiology.  Patient is to begin taking sodium chloride tablet, is to follow-up with oncology tomorrow.  Will consider further workup based on today's labs.   Assessment & Plan  Hyponatremia  - Will plan to recheck patient's sodium today.   - Evening addendum: Patient's repeat sodium was 125.  Called patient to discuss.  This is 2 points higher from discharge (123).  Will recheck sodium again either tomorrow evening or the following day.  Advised patient that she should begin  taking 2 sodium chloride tablets daily.  -Patient is to see oncology tomorrow.  Plans to discuss with them if hyponatremia is common in this particular chemotherapy regimen.  -If patient continues to be persistently hyponatremic, can pursue further workup to assess for other etiologies.   - TSH within normal limits during hospital stay, CT abdomen revealed no new masses, patient did have low a.m. cortisol previously.  Adrenal insufficiency could be potential factor.  Orders:    Basic metabolic panel; Future    Follow Up In [facility] - PCP - Established; Future    Basic metabolic panel; Future    sodium chloride 1,000 mg tablet; Take 1 tablet (1 g) by mouth 2 times a day.    Hypertension, benign  - Patient blood pressure persistently well-controlled.  Given patient does endorse feeling lightheaded from time to time, would be reasonable for patient to trial 10 mg lisinopril rather than 20.  Will check blood pressure at home.  Recheck blood pressure at next appointment.  Orders:    lisinopril 10 mg tablet; Take 1 tablet (10 mg) by mouth once daily.    Follow Up In [facility] - PCP - Established; Future    Maxillary sinusitis, unspecified chronicity  - Patient continues to take Augmentin s/p discharge from 7/12 for intermittent fevers of unknown source, likely maxillary sinusitis.  -Patient has no recurrent fevers, no sinus symptoms.  Patient is to follow-up with Dr. Hendricks 3 weeks from discharge per hospital course notes.  Orders:    Referral to Infectious Disease; Future           Awais Barr MD 07/21/25 5:33 PM       Dragon voice to text was used in the creation of this note. Please excuse any grammatical errors.

## 2025-07-21 NOTE — ASSESSMENT & PLAN NOTE
- Will plan to recheck patient's sodium today.   - Evening addendum: Patient's repeat sodium was 125.  Called patient to discuss.  This is 2 points higher from discharge (123).  Will recheck sodium again either tomorrow evening or the following day.  Advised patient that she should begin taking 2 sodium chloride tablets daily.  -Patient is to see oncology tomorrow.  Plans to discuss with them if hyponatremia is common in this particular chemotherapy regimen.  -If patient continues to be persistently hyponatremic, can pursue further workup to assess for other etiologies.   - TSH within normal limits during hospital stay, CT abdomen revealed no new masses, patient did have low a.m. cortisol previously.  Adrenal insufficiency could be potential factor.  Orders:    Basic metabolic panel; Future    Follow Up In [facility] - PCP - Established; Future    Basic metabolic panel; Future    sodium chloride 1,000 mg tablet; Take 1 tablet (1 g) by mouth 2 times a day.

## 2025-07-21 NOTE — TELEPHONE ENCOUNTER
Patient's daughter, Janet, called to discuss follow up and infusion for tomorrow. She has questions re: patient's sodium levels and treatment of said levels, causes etc.  (The patient is s/p 2 hospital admissions at both [facility] and [facility] for altered mental status for what seems to be cause by hyponatremia. Started on 7/9 and most recently discharged 7/20).     Daughter is worried about leaving patient alone at home as she lives alone and would like to discuss w Dr. Palacio. Also has other questions including port placement etc, future treatments.  Dr. Palacio is aware, message sent to coordinators for VV today on her schedule.

## 2025-07-21 NOTE — PROGRESS NOTES
Consent:  Verbal consent was requested and obtained from patient on this date for a telehealth visit.    Patient ID: Mica Vanessa is a 69 y.o. female.  Referring Physician: No referring provider defined for this encounter.  Primary Care Provider: Joi Valle DO    Subjective    Patient hospitalized after last visit with worsening confusion and severe hyponatremia    Discharged home yesterday with sodium of 123 and sodium tablets    On review of pretreatment labs from June noted to have lower ACTH and cortisol.  Was asymptomatic at that time    Patient overall feeling well but fatigued    Did not want to come to emergency room today for evaluation        Objective    BSA: There is no height or weight on file to calculate BSA.  There were no vitals taken for this visit.     Physical Exam    Performance Status:  Symptomatic; in bed <50% of the day    Assessment/Plan    Oncology History Overview Note   Cancer history:  10/6/23 EMB w/ uterine cancer, mesonephric type  10/21/23 Total laparoscopic hysterectomy bilateral salpingo oophorectomy, Bilateral sentinel lymph node injection and mapping, Left pelvic sentinel lymph node dissection and right complete pelvic lymphadenectomy, Cystourethroscopy.  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 MMRp, P53 alison type. TB recommendations for radiation oncology consult, genetics referral, consider systemic therapy  Adjuvant pelvic RT  7/2024 - new pulmonary nodules, all less than 1cm  10/2024 CT chest stable  3/2025 biopsy of lung nodule - c/w metastatic uterine cancer  3/2025 carbo/taxol/pembro     Malignant neoplasm of endometrium (Multi)   11/10/2023 Initial Diagnosis    Malignant neoplasm of endometrium (Multi)     3/31/2025 -  Chemotherapy    Pembrolizumab + PACLitaxel / CARBOplatin, 21 Day Cycles followed by Pembrolizumab, 42 Day Cycles - Gyn        Cancer Staging   No matching staging information was found for the  patient.       Problem List Items Addressed This Visit           ICD-10-CM    Malignant neoplasm of endometrium (Multi) C54.1    Chemotherapy management, encounter for Z51.11     Other Visit Diagnoses         Codes      Hyponatremia    -  Primary E87.1    Relevant Orders    Cortisol AM    Referral to Endocrinology             Treatment Plans       Name Type Plan Dates Plan Provider         Active    Pembrolizumab + PACLitaxel / CARBOplatin, 21 Day Cycles followed by Pembrolizumab, 42 Day Cycles - Gyn  Oncology Treatment 3/28/2025 - Present Nannette Palacio MD                    Reviewed events of last 2 weeks.  Overall significant electrolyte abnormalities in setting of recent pembrolizumab exposure and lower ACTH and cortisol raise suspicion for pembrolizumab toxicity.  Patient was advised earlier in the day to come to the emergency room for evaluation but she is overall feeling well and wishes to stay at home.  There are no beds available for direct admission at this time    I recommended patient take additional sodium tablet this evening and tomorrow morning.    Office will contact the patient tomorrow morning to notify if beds are available for direct admission and endocrinology consult  Urgent endocrinology referral placed  Cortisol added to recent labs  I spent 15 minutes in telephone discussion with the patient and her daughter

## 2025-07-22 ENCOUNTER — HOME CARE VISIT (OUTPATIENT)
Dept: HOME HEALTH SERVICES | Facility: HOME HEALTH | Age: 70
End: 2025-07-22
Payer: MEDICARE

## 2025-07-22 ENCOUNTER — TELEPHONE (OUTPATIENT)
Dept: GYNECOLOGIC ONCOLOGY | Facility: HOSPITAL | Age: 70
End: 2025-07-22
Payer: MEDICARE

## 2025-07-22 ENCOUNTER — APPOINTMENT (OUTPATIENT)
Dept: HEMATOLOGY/ONCOLOGY | Facility: CLINIC | Age: 70
End: 2025-07-22
Payer: MEDICARE

## 2025-07-22 VITALS
RESPIRATION RATE: 16 BRPM | DIASTOLIC BLOOD PRESSURE: 73 MMHG | HEART RATE: 70 BPM | OXYGEN SATURATION: 98 % | SYSTOLIC BLOOD PRESSURE: 117 MMHG | TEMPERATURE: 98.7 F

## 2025-07-22 VITALS — DIASTOLIC BLOOD PRESSURE: 74 MMHG | OXYGEN SATURATION: 95 % | SYSTOLIC BLOOD PRESSURE: 114 MMHG

## 2025-07-22 DIAGNOSIS — E87.1 HYPONATREMIA: Primary | ICD-10-CM

## 2025-07-22 DIAGNOSIS — E87.1 HYPONATREMIA: ICD-10-CM

## 2025-07-22 LAB
ANION GAP SERPL CALCULATED.4IONS-SCNC: 12 MMOL/L (CALC) (ref 7–17)
BUN SERPL-MCNC: 15 MG/DL (ref 7–25)
BUN/CREAT SERPL: 33 (CALC) (ref 6–22)
CALCIUM SERPL-MCNC: 9.4 MG/DL (ref 8.6–10.4)
CHLORIDE SERPL-SCNC: 93 MMOL/L (ref 98–110)
CO2 SERPL-SCNC: 21 MMOL/L (ref 20–32)
CREAT SERPL-MCNC: 0.45 MG/DL (ref 0.6–1)
EGFRCR SERPLBLD CKD-EPI 2021: 103 ML/MIN/1.73M2
GLUCOSE SERPL-MCNC: 99 MG/DL (ref 65–139)
POTASSIUM SERPL-SCNC: 4.3 MMOL/L (ref 3.5–5.3)
SODIUM SERPL-SCNC: 126 MMOL/L (ref 135–146)

## 2025-07-22 PROCEDURE — G0151 HHCP-SERV OF PT,EA 15 MIN: HCPCS | Mod: HHH

## 2025-07-22 PROCEDURE — G0299 HHS/HOSPICE OF RN EA 15 MIN: HCPCS | Mod: HHH

## 2025-07-22 SDOH — ECONOMIC STABILITY: FOOD INSECURITY: SNACKS PER DAY: 0

## 2025-07-22 SDOH — ECONOMIC STABILITY: FOOD INSECURITY: MEALS PER DAY: 2

## 2025-07-22 ASSESSMENT — ENCOUNTER SYMPTOMS
PAIN LOCATION - PAIN SEVERITY: 6/10
STOOL FREQUENCY: LESS THAN DAILY
DENIES PAIN: 1
PERSON REPORTING PAIN: PATIENT
PERSON REPORTING PAIN: PATIENT
PAIN LOCATION - PAIN QUALITY: ACHE
PAIN: 1
OCCASIONAL FEELINGS OF UNSTEADINESS: 1
NAUSEA: 1
MUSCLE WEAKNESS: 1
SLEEP QUALITY: ADEQUATE
PAIN LOCATION: BACK
PAIN LOCATION - RELIEVING FACTORS: LAYING DOWN
PAIN LOCATION - PAIN FREQUENCY: INTERMITTENT
AGGRESSION WITHIN DEFINED LIMITS: 1
CHANGE IN APPETITE: DECREASED
ANGER WITHIN DEFINED LIMITS: 1
APPETITE LEVEL: POOR
LAST BOWEL MOVEMENT: 67406

## 2025-07-22 ASSESSMENT — ACTIVITIES OF DAILY LIVING (ADL)
ENTERING_EXITING_HOME: SUPERVISION
OASIS_M1830: 03
AMBULATION ASSISTANCE ON FLAT SURFACES: 1
AMBULATION ASSISTANCE: STAND BY ASSIST
CURRENT_FUNCTION: STAND BY ASSIST

## 2025-07-22 NOTE — TELEPHONE ENCOUNTER
Update: Spoke with patient's daughter, relayed plan which is to continue salt tablets BID. Go for fasting labs Thursday morning at [facility]. She states that she was offered an apt with endocrinology at the end of August- I urged her to call back and take that apt and we could always cancel if we got her in sooner. Either myself or Dr. Palacio will call Thursday after labs are back. She verbalized understanding and is amenable. Plan to go to ED if patient shows signs of altered mental status.     Received VM from patient's daughter this morning that the patient is in good spirits and doing well, minus some fatigue. Per Dr. Palacio, plan for STAT endo consult (message sent to Dr. Rizzo to see if we can obtain an apt as central scheduling could not get patient in for an apt until January 2026) and patient to get fasting labs on Thursday morning 7/24. I called daughter back to relay this information, left VM to please call back to discuss.

## 2025-07-22 NOTE — PROGRESS NOTES
I saw and evaluated the patient. I personally obtained the key and critical portions of the history and physical exam or was physically present for key and critical portions performed by the resident. I reviewed the resident/fellow's documentation and discussed the patient with the resident/fellow. I agree with the resident/fellow's medical decision making as documented in the note.  Patient is pretty much at her baseline per her daughter and the resident.  Patient is feeling better, does not have the confusion.  She does have an oncology appointment tomorrow.  She is expecting they will be doing blood work also.  Patient's been trying to eat well, has been taking her salt tablets.  Trying to stay hydrated.  No chest pain no short of breath no fever no chills, we will recheck the blood work.  Will adjust accordingly.  She is to follow-up with her specialist.  May need to find out why she is hyponatremic.  Patient aware if any chest pain shortness of breath any nausea vomiting confusion any weakness seizure-like activity abdominal pain go to the ER.  Daughter is here she is also aware of this  Follow-up in 1 week  Retest blood work today, in 2 days, and 5 days  Agree with assessment and plan    Maverick Chavira, DO

## 2025-07-23 ENCOUNTER — HOME CARE VISIT (OUTPATIENT)
Dept: HOME HEALTH SERVICES | Facility: HOME HEALTH | Age: 70
End: 2025-07-23
Payer: MEDICARE

## 2025-07-23 PROCEDURE — G0152 HHCP-SERV OF OT,EA 15 MIN: HCPCS | Mod: HHH | Performed by: OCCUPATIONAL THERAPIST

## 2025-07-23 ASSESSMENT — ACTIVITIES OF DAILY LIVING (ADL)
DRESSING_UB_CURRENT_FUNCTION: INDEPENDENT
BATHING_CURRENT_FUNCTION: STAND BY ASSIST
AMBULATION ASSISTANCE: 1
CURRENT_FUNCTION: INDEPENDENT
PREPARING MEALS: NEEDS ASSISTANCE
AMBULATION ASSISTANCE: SUPERVISION
PHYSICAL TRANSFERS ASSESSED: 1
BATHING ASSESSED: 1
TOILETING: INDEPENDENT
LAUNDRY ASSESSED: 1
DRESSING_LB_CURRENT_FUNCTION: INDEPENDENT
LAUNDRY: NEEDS ASSISTANCE
TOILETING: 1

## 2025-07-23 ASSESSMENT — ENCOUNTER SYMPTOMS: DENIES PAIN: 1

## 2025-07-24 ENCOUNTER — TELEPHONE (OUTPATIENT)
Dept: GYNECOLOGIC ONCOLOGY | Facility: HOSPITAL | Age: 70
End: 2025-07-24
Payer: MEDICARE

## 2025-07-24 ENCOUNTER — LAB (OUTPATIENT)
Dept: LAB | Facility: CLINIC | Age: 70
End: 2025-07-24
Payer: MEDICARE

## 2025-07-24 DIAGNOSIS — E87.1 HYPONATREMIA: ICD-10-CM

## 2025-07-24 LAB
ALBUMIN SERPL BCP-MCNC: 4.3 G/DL (ref 3.4–5)
ALP SERPL-CCNC: 54 U/L (ref 33–136)
ALT SERPL W P-5'-P-CCNC: 16 U/L (ref 7–45)
ANION GAP SERPL CALC-SCNC: 13 MMOL/L (ref 10–20)
AST SERPL W P-5'-P-CCNC: 11 U/L (ref 9–39)
BILIRUB SERPL-MCNC: 0.4 MG/DL (ref 0–1.2)
BUN SERPL-MCNC: 12 MG/DL (ref 6–23)
CALCIUM SERPL-MCNC: 9.2 MG/DL (ref 8.6–10.3)
CHLORIDE SERPL-SCNC: 94 MMOL/L (ref 98–107)
CO2 SERPL-SCNC: 26 MMOL/L (ref 21–32)
CORTIS AM PEAK SERPL-MSCNC: 33.4 UG/DL (ref 5–20)
CREAT SERPL-MCNC: 0.5 MG/DL (ref 0.5–1.05)
EGFRCR SERPLBLD CKD-EPI 2021: >90 ML/MIN/1.73M*2
GLUCOSE SERPL-MCNC: 114 MG/DL (ref 74–99)
POTASSIUM SERPL-SCNC: 3.9 MMOL/L (ref 3.5–5.3)
PROT SERPL-MCNC: 6.3 G/DL (ref 6.4–8.2)
SODIUM SERPL-SCNC: 129 MMOL/L (ref 136–145)
TSH SERPL-ACNC: 1.06 MIU/L (ref 0.44–3.98)

## 2025-07-24 PROCEDURE — 82533 TOTAL CORTISOL: CPT

## 2025-07-24 PROCEDURE — 80053 COMPREHEN METABOLIC PANEL: CPT

## 2025-07-24 PROCEDURE — 82024 ASSAY OF ACTH: CPT

## 2025-07-24 PROCEDURE — 84443 ASSAY THYROID STIM HORMONE: CPT

## 2025-07-24 PROCEDURE — 36415 COLL VENOUS BLD VENIPUNCTURE: CPT

## 2025-07-24 NOTE — TELEPHONE ENCOUNTER
Spoke with patient's daughter, Janet, re: labs from today. Na levels coming up at 129. Rest of panel not resulted. Per daughter, she was able to obtain an apt with private practice endocrinologist. [facility] is trying to get her in. Per Dr. Palacio, patient is to continue sodium tabs BID. Will update her when I am aware of what the rest of the plan is as far as tx once ACTH/cortisol levels are back.    Daughter also states that patient is struggling with nausea, possibly due to abx that she continues to take for chronic ear infection. Rec scheduling Zofran/Compazine Q4 hrs ATC, call back tomorrow if not improved. She verbalized understanding and is amenable.

## 2025-07-25 ENCOUNTER — HOME CARE VISIT (OUTPATIENT)
Dept: HOME HEALTH SERVICES | Facility: HOME HEALTH | Age: 70
End: 2025-07-25
Payer: MEDICARE

## 2025-07-25 ENCOUNTER — TELEPHONE (OUTPATIENT)
Dept: GYNECOLOGIC ONCOLOGY | Facility: HOSPITAL | Age: 70
End: 2025-07-25

## 2025-07-25 DIAGNOSIS — T45.1X5A CHEMOTHERAPY-INDUCED NAUSEA: ICD-10-CM

## 2025-07-25 DIAGNOSIS — Z51.11 CHEMOTHERAPY MANAGEMENT, ENCOUNTER FOR: Primary | ICD-10-CM

## 2025-07-25 DIAGNOSIS — R11.0 CHEMOTHERAPY-INDUCED NAUSEA: ICD-10-CM

## 2025-07-25 DIAGNOSIS — C54.1 ENDOMETRIAL CANCER (MULTI): ICD-10-CM

## 2025-07-25 PROCEDURE — G0155 HHCP-SVS OF CSW,EA 15 MIN: HCPCS | Mod: HHH

## 2025-07-25 RX ORDER — METOCLOPRAMIDE 10 MG/1
10 TABLET ORAL EVERY 6 HOURS PRN
Qty: 40 TABLET | Refills: 0 | Status: SHIPPED | OUTPATIENT
Start: 2025-07-25 | End: 2025-08-04

## 2025-07-25 SDOH — SOCIAL STABILITY: SOCIAL NETWORK: HELP FROM FAMILY/FRIENDS: CHILDREN.

## 2025-07-25 ASSESSMENT — ACTIVITIES OF DAILY LIVING (ADL): AMBULATION_REQUIRES_ASSISTANCE: 1

## 2025-07-25 NOTE — TELEPHONE ENCOUNTER
Called patient's daughter, Janet, to update her on interim plan which is to continue sodium tablets BID and touch base with endocrine on 8/13 (private practice, will hope to obtain an apt with  melinda in the mean time). No ca directed infusions until then. Will plan for VV with Dr. Palacio on 8/15 (message sent to coordinators to schedule).      Daughter also states that patient has had nausea for the last week or so, has not improved with scheduled Zofran/ Compazine Q4 hrs. Will ask if a different antiemetic be sent to her local pharmacy. She also has not had a BM in at least 5 days, but passing lots of gas. Rec daily Miralax when she is able to tolerate, or MOM if needed. Patient verbalized understanding.

## 2025-07-25 NOTE — TELEPHONE ENCOUNTER
Called patient to inform her that Reglan has been called into her local pharmacy for nausea.  Also instructed her to start daily Miralax- pt states she does not like to take, she prefers Senna. Instructed her that if she does not have a BM by tomorrow, she should take 30-60 ml of MOM Q6-8 hours until BM is had, and then stop the MOM and that we highly suggest daily Miralax to keep bowels regular. Patient verbalized understanding.

## 2025-07-26 LAB — ACTH PLAS-MCNC: 50.1 PG/ML (ref 7.2–63.3)

## 2025-07-26 ASSESSMENT — ACTIVITIES OF DAILY LIVING (ADL): SHOPPING_REQUIRES_ASSISTANCE: 1

## 2025-07-29 ENCOUNTER — APPOINTMENT (OUTPATIENT)
Dept: HOME HEALTH SERVICES | Facility: HOME HEALTH | Age: 70
End: 2025-07-29
Payer: MEDICARE

## 2025-07-29 ENCOUNTER — HOME CARE VISIT (OUTPATIENT)
Dept: HOME HEALTH SERVICES | Facility: HOME HEALTH | Age: 70
End: 2025-07-29
Payer: MEDICARE

## 2025-07-29 PROCEDURE — G0152 HHCP-SERV OF OT,EA 15 MIN: HCPCS | Mod: HHH | Performed by: OCCUPATIONAL THERAPIST

## 2025-07-29 ASSESSMENT — ENCOUNTER SYMPTOMS: DENIES PAIN: 1

## 2025-07-30 ENCOUNTER — TELEPHONE (OUTPATIENT)
Dept: GYNECOLOGIC ONCOLOGY | Facility: HOSPITAL | Age: 70
End: 2025-07-30
Payer: MEDICARE

## 2025-07-30 NOTE — TELEPHONE ENCOUNTER
Update: Called patient back, suggested taking antiemetic 30 min prior to abx. Her abx course is 21 days, today is day 19. She states she may stop it as she was also on different abx prior to Augmentin, and see if this helps. She will call our office back for any other needs. She will be seeing Dr. Rizzo from endocrinology on 8/5 and follow up with Dr. Palacio on 8/15.     Patient called with ongoing nausea that is intermittent and unrelieved by antiemetics. She states that Compazine and Reglan seem to work best for her. Last BM was soft and yesterday. She is not sure if it is the Augmentin that is causing nausea, maybe sodium tablets. She is eating and drinking but small amounts of food. She is making sure that she is taking abx with food. She is wondering if she needs to be concerned. She has not had any further episodes of altered mental status. I suggested alternating Compazine & Reglan Q3 hours to see if this would provide relief, and also calling her PCP to see if there is another abx in same class that would be possible to switch to for the remainder of course (sees ENT next week). I will call her back with any suggestions from our team.

## 2025-07-31 ENCOUNTER — HOME CARE VISIT (OUTPATIENT)
Dept: HOME HEALTH SERVICES | Facility: HOME HEALTH | Age: 70
End: 2025-07-31
Payer: MEDICARE

## 2025-07-31 VITALS
TEMPERATURE: 97.2 F | HEART RATE: 82 BPM | DIASTOLIC BLOOD PRESSURE: 86 MMHG | OXYGEN SATURATION: 98 % | SYSTOLIC BLOOD PRESSURE: 123 MMHG | RESPIRATION RATE: 16 BRPM

## 2025-07-31 PROCEDURE — G0299 HHS/HOSPICE OF RN EA 15 MIN: HCPCS | Mod: HHH

## 2025-07-31 ASSESSMENT — ENCOUNTER SYMPTOMS
PERSON REPORTING PAIN: PATIENT
DENIES PAIN: 1

## 2025-07-31 ASSESSMENT — ACTIVITIES OF DAILY LIVING (ADL)
HOME_HEALTH_OASIS: 00
OASIS_M1830: 00

## 2025-08-01 ENCOUNTER — CLINICAL SUPPORT (OUTPATIENT)
Dept: AUDIOLOGY | Facility: CLINIC | Age: 70
End: 2025-08-01
Payer: MEDICARE

## 2025-08-01 DIAGNOSIS — C54.1 MALIGNANT NEOPLASM OF ENDOMETRIUM (MULTI): ICD-10-CM

## 2025-08-01 DIAGNOSIS — H90.3 SENSORINEURAL HEARING LOSS (SNHL) OF BOTH EARS: Primary | ICD-10-CM

## 2025-08-01 DIAGNOSIS — Z51.11 CHEMOTHERAPY MANAGEMENT, ENCOUNTER FOR: ICD-10-CM

## 2025-08-01 DIAGNOSIS — D31.42: ICD-10-CM

## 2025-08-01 PROCEDURE — 92557 COMPREHENSIVE HEARING TEST: CPT | Performed by: AUDIOLOGIST

## 2025-08-01 PROCEDURE — 92550 TYMPANOMETRY & REFLEX THRESH: CPT | Mod: 52 | Performed by: AUDIOLOGIST

## 2025-08-01 NOTE — PROGRESS NOTES
Name: Mica Vanessa  YOB: 1955  Age: 69 y.o.    Date of Evaluation:  08/01/2025   Mica Vanessa, 69 y.o., is being seen for a hearing test prior to an appointment with Dr. Sam.  Mica reports a history of left ear infection starting on July, she was given multiple rounds of medication. But does not feel that it has not completely resolved. Left ear has fullness and feels like it is not going away. She has no complaints for the right ear.      She has tinnitus bilaterally, high pitched.      She reported a history of ear infections since childhood. She ruptured an eardrum years ago, but does not recall which ear it occurred.     Mica Vanessa denied the following: dizziness/vertigo, ear surgery, recent falls, significant noise exposure, sinus/throat concerns, ear drainage, sudden hearing loss    Evaluation:  Otoscopy revealed clear canals with visible tympanic membranes bilaterally.    Immittance:  Right: Type A middle ear function with normal compliance, peak pressure, and ear canal volume.  Left: Type C middle ear function with normal compliance, significantly negative peak pressure, and normal ear canal volume.    Ipsilateral Acoustic Reflexes:  Right: present at 1000 and 4000 Hz. Absent 500 and 2000 Hz  Left: absent 500-4000 Hz.    Behavioral Audiometry:  Right: mild sensorineural hearing loss 125-4000 Hz sloping to a severe hearing loss 7407-8841 Hz. Excellent word understanding (100 %) at 60 dB HL.  Left:  mild sensorineural hearing loss 125-4000 Hz sloping to profound hearing loss 1506-5337 Hz. Excellent word understanding (100 %) at 60 dB HL.    Pure tone averages in agreement with speech reception thresholds.    Results:  Today's results were discussed with the patient indicating mild sensorineural hearing loss 125-4000 Hz sloping to a severe (right) profound (left) hearing loss through 8000 Hz. Excellent word understanding bilaterally.    Treatment Plan:  Follow-up with referring  provider  Retest hearing in conjunction with medical management or if a change in hearing occurs  Retest hearing in conjunction with chemotherapy treatment    Time: 3411-2688      Completed by  Robert Victor MS  Audiology [facility]    Under the supervision of  Jaylin See, CCC-A  Licensed Senior Audiologist

## 2025-08-04 ENCOUNTER — APPOINTMENT (OUTPATIENT)
Dept: GYNECOLOGIC ONCOLOGY | Facility: CLINIC | Age: 70
End: 2025-08-04
Payer: MEDICARE

## 2025-08-04 ENCOUNTER — APPOINTMENT (OUTPATIENT)
Facility: CLINIC | Age: 70
End: 2025-08-04
Payer: MEDICARE

## 2025-08-04 ENCOUNTER — APPOINTMENT (OUTPATIENT)
Dept: HEMATOLOGY/ONCOLOGY | Facility: CLINIC | Age: 70
End: 2025-08-04
Payer: MEDICARE

## 2025-08-04 VITALS
HEIGHT: 62 IN | TEMPERATURE: 96.8 F | SYSTOLIC BLOOD PRESSURE: 141 MMHG | BODY MASS INDEX: 23.19 KG/M2 | WEIGHT: 126 LBS | DIASTOLIC BLOOD PRESSURE: 84 MMHG

## 2025-08-04 DIAGNOSIS — J30.9 CHRONIC ALLERGIC RHINITIS: Primary | ICD-10-CM

## 2025-08-04 DIAGNOSIS — H69.93 DYSFUNCTION OF BOTH EUSTACHIAN TUBES: ICD-10-CM

## 2025-08-04 DIAGNOSIS — H90.3 BILATERAL HIGH FREQUENCY SENSORINEURAL HEARING LOSS: ICD-10-CM

## 2025-08-04 PROCEDURE — 1111F DSCHRG MED/CURRENT MED MERGE: CPT | Performed by: OTOLARYNGOLOGY

## 2025-08-04 PROCEDURE — 99204 OFFICE O/P NEW MOD 45 MIN: CPT | Performed by: OTOLARYNGOLOGY

## 2025-08-04 PROCEDURE — 1159F MED LIST DOCD IN RCRD: CPT | Performed by: OTOLARYNGOLOGY

## 2025-08-04 PROCEDURE — 3077F SYST BP >= 140 MM HG: CPT | Performed by: OTOLARYNGOLOGY

## 2025-08-04 PROCEDURE — 1160F RVW MEDS BY RX/DR IN RCRD: CPT | Performed by: OTOLARYNGOLOGY

## 2025-08-04 PROCEDURE — 3008F BODY MASS INDEX DOCD: CPT | Performed by: OTOLARYNGOLOGY

## 2025-08-04 PROCEDURE — 3079F DIAST BP 80-89 MM HG: CPT | Performed by: OTOLARYNGOLOGY

## 2025-08-04 NOTE — PROGRESS NOTES
Impression:  1. Chronic allergic rhinitis        2. Dysfunction of both eustachian tubes        3. Bilateral high frequency sensorineural hearing loss             RECOMMENDATIONS/PLAN :  I reassured the patient there is no evidence of any middle ear fluid however her left tympanic membrane is slightly retracted with decreased mobility.  I recommend that she start rinsing her nose using saline rinses every other day and she can restart Flonase nasal spray-2 puffs each nostril daily for the next 6 to 8 weeks.  I want her to stop taking Sudafed.      **This electronic medical record note was created with the use of voice recognition software.  Despite proofreading, typographical or grammatical errors may be present that could affect meaning of content **    Subjective   Patient ID:     Mica Vanessa is a 69 y.o. female who presents to the office today complaining of intermittent pressure in the left ear.  She has had some nasal congestion with lightheadedness in the mornings so she has been taking Sudafed.  She feels her left tympanic membrane moving back-and-forth.  She denies any significant fluctuation in hearing or any true spinning/vertigo.  No recent fever or chills.    ROS:  A detailed 12 system review of systems is noted on the intake form has been reviewed with the patient with details noted in the HPI and scanned into the patient's medical record.    Objective     Medical History[1]     Surgical History[2]     RX Allergies[3]     Current Medications[4]     Tobacco Use: Medium Risk (8/4/2025)    Patient History     Smoking Tobacco Use: Former     Smokeless Tobacco Use: Never     Passive Exposure: Not on file        Alcohol Use: Not At Risk (7/9/2025)    AUDIT-C     Frequency of Alcohol Consumption: Never     Average Number of Drinks: Patient does not drink     Frequency of Binge Drinking: Never        Social History     Substance and Sexual Activity   Drug Use Not Currently    Types: Marijuana        Physical  "Exam:  Visit Vitals  /84   Temp 36 °C (96.8 °F) (Temporal)   Ht 1.575 m (5' 2\")   Wt 57.2 kg (126 lb)   BMI 23.05 kg/m²   OB Status Postmenopausal   Smoking Status Former   BSA 1.58 m²      General: Patient is alert, oriented, cooperative in no apparent distress.  Head: Normocephalic, atraumatic.  Eyes: PERRL, EOMI, Conjunctiva is clear. No nystagmus.  Ears: Right Ear-- Pinna is normal.  External auditory canal is patent. Tympanic membrane is [intact, translucent and has good mobility with my pneumatic otoscope. No effusion].  Mastoid is nontender.  Left ear-- Pinna is normal.  External auditory canal is patent. Tympanic membrane is intact and slightly retracted with decreased mobility.  No effusion..  Mastoid is nontender.  Nose: Septum is straight.  No septal perforation or lesions. No septal hematoma/ seroma.  No signs of bleeding.  Inferior turbinates are mildly swollen.   No evidence of intranasal polyps.  No infectious drainage.  Throat:  Floor of mouth is clear, no masses.  Tongue appears normal, no lesions or masses. Gums, gingiva, buccal mucosa appear pink and moist, no lesions. Teeth are in fair repair..  No obvious dental infections.  Peritonsillar regions appear symmetric without swelling.  Hard and soft palate appear normal, no obvious cleft. Uvula is midline.  Oropharynx: No lesions. Retropharyngeal wall is flat.  No active postnasal drip.  Neck: Supple,  no lymphadenopathy.  No masses.  Salivary Glands: Symmetric bilaterally.  No palpable masses.  No evidence of acute infection or salivary stones  Neurologic: Cranial Nerves 2-12 are grossly intact without focal deficits. Cerebellar function testing is normal.     Results:   I reviewed her recent audiogram and she has a mild high-frequency sensorineural loss in both ears.  Tympanic membranes are not moving well on tympanometry.  Word recognition scores were 100% bilaterally.  Speech reception threshold is 20 dB bilaterally.    Procedure: "   []    Blu Sam DO        [1]   Past Medical History:  Diagnosis Date    Abnormal ECG     hx of    Anxiety 10/2024    Arthritis     Some in spine    Khan esophagus     Breast cancer 2007    chemo/radiation right sided lumpectomy    Dizziness 2025    Dysfunctional uterine bleeding     Ear problems 2025    Essential (primary) hypertension 2022    Benign essential hypertension    GERD (gastroesophageal reflux disease)     Hx antineoplastic chemo     Hyperlipidemia     Osteopenia     Other seasonal allergic rhinitis     Seasonal allergies    Personal history of irradiation     Personal history of malignant neoplasm of breast     History of malignant neoplasm of breast    Personal history of other diseases of the circulatory system     History of hypertension    Personal history of other diseases of the digestive system     History of gastrointestinal disorder   [2]   Past Surgical History:  Procedure Laterality Date    BREAST BIOPSY      BREAST LUMPECTOMY       SECTION, LOW TRANSVERSE  83, 84, 91    HYSTERECTOMY  2023    LUMBAR PERITONEAL SHUNT      OTHER SURGICAL HISTORY  2022     section    OTHER SURGICAL HISTORY  2022    Tonsillectomy    OTHER SURGICAL HISTORY  2022    Lumpectomy    SKIN CANCER EXCISION      vulva    TONSILLECTOMY  1960    TUBAL LIGATION     [3] No Known Allergies  [4]   Current Outpatient Medications:     gemfibrozil (Lopid) 600 mg tablet, Take 1 tablet (600 mg) by mouth once daily., Disp: , Rfl:     lisinopril 10 mg tablet, Take 1 tablet (10 mg) by mouth once daily., Disp: 100 tablet, Rfl: 3    magnesium gluconate (Magonate) 27 mg magnesium (500 mg) tablet, Take 2 tablets (54 mg) by mouth 2 times a day., Disp: 120 tablet, Rfl: 5    metoclopramide (Reglan) 10 mg tablet, Take 1 tablet (10 mg) by mouth every 6 hours if needed (nausea) for up to 10 days., Disp: 40 tablet, Rfl: 0    prochlorperazine (Compazine) 10 mg tablet, Take 1  tablet (10 mg) by mouth every 6 hours if needed for nausea or vomiting., Disp: 30 tablet, Rfl: 5    propranolol (Inderal) 10 mg tablet, TAKE 1 TABLET (10 MG) BY MOUTH 2 TIMES A DAY AS NEEDED (ANXIETY, PALPITATIONS)., Disp: , Rfl:     [id number] no.[phone] mg-mcg-mcg-mg tablet, Take 100 mg by mouth once daily. Indications: symptoms associated with chemo, Disp: , Rfl:     biotin 10,000 mcg capsule, Take 1 capsule (10 mg) by mouth once daily. (Patient not taking: Reported on 8/4/2025), Disp: , Rfl:     escitalopram (Lexapro) 10 mg tablet, Take 1 tablet (10 mg) by mouth once daily., Disp: 30 tablet, Rfl: 5    loratadine (Claritin) 10 mg tablet, Take 1 tablet (10 mg) by mouth once daily as needed for allergies., Disp: , Rfl:     lysine 1,000 mg tablet, Take 1 tablet (1,000 mg) by mouth once daily., Disp: , Rfl:     ondansetron (Zofran) 8 mg tablet, Take 1 tablet (8 mg) by mouth every 8 hours if needed for nausea or vomiting., Disp: 30 tablet, Rfl: 5    sennosides (senna) 8.6 mg capsule, Take 1 capsule (8.6 mg) by mouth once daily at bedtime. (Patient not taking: Reported on 8/4/2025), Disp: 30 capsule, Rfl: 2    sodium chloride 1,000 mg tablet, Take 1 tablet (1 g) by mouth 2 times a day. (Patient not taking: Reported on 8/4/2025), Disp: 60 tablet, Rfl: 2     PAST SURGICAL HISTORY:  AV fistula 2013/ left forearm    H/O colonoscopy     H/O ileostomy '    for 3 months. s/p Reversal    Kidney transplant recipient 2018  @ [facility] :  +  Hep C Donor    S/P anal fissurectomy and chemical denervation  with biopsy and fulguration of anal lesions, 2021    S/p nephrectomy left 9/15/2015   + Cancer    S/p nephrectomy right 12/10/2015   benign    S/P right knee arthroscopy     S/P TURP (transurethral resection of prostate) and Transplant Kidney Stent Replacemernt, 21

## 2025-08-05 ENCOUNTER — APPOINTMENT (OUTPATIENT)
Dept: ENDOCRINOLOGY | Facility: CLINIC | Age: 70
End: 2025-08-05
Payer: MEDICARE

## 2025-08-05 VITALS
SYSTOLIC BLOOD PRESSURE: 159 MMHG | DIASTOLIC BLOOD PRESSURE: 92 MMHG | HEART RATE: 77 BPM | BODY MASS INDEX: 23.74 KG/M2 | HEIGHT: 62 IN | WEIGHT: 129 LBS

## 2025-08-05 DIAGNOSIS — R79.89 LOW SERUM CORTISOL LEVEL: Primary | ICD-10-CM

## 2025-08-05 PROCEDURE — 3080F DIAST BP >= 90 MM HG: CPT | Performed by: INTERNAL MEDICINE

## 2025-08-05 PROCEDURE — 3008F BODY MASS INDEX DOCD: CPT | Performed by: INTERNAL MEDICINE

## 2025-08-05 PROCEDURE — 3077F SYST BP >= 140 MM HG: CPT | Performed by: INTERNAL MEDICINE

## 2025-08-05 PROCEDURE — 99204 OFFICE O/P NEW MOD 45 MIN: CPT | Performed by: INTERNAL MEDICINE

## 2025-08-05 PROCEDURE — 1111F DSCHRG MED/CURRENT MED MERGE: CPT | Performed by: INTERNAL MEDICINE

## 2025-08-05 PROCEDURE — 1159F MED LIST DOCD IN RCRD: CPT | Performed by: INTERNAL MEDICINE

## 2025-08-05 ASSESSMENT — PATIENT HEALTH QUESTIONNAIRE - PHQ9
SUM OF ALL RESPONSES TO PHQ9 QUESTIONS 1 & 2: 0
1. LITTLE INTEREST OR PLEASURE IN DOING THINGS: NOT AT ALL
2. FEELING DOWN, DEPRESSED OR HOPELESS: NOT AT ALL

## 2025-08-05 ASSESSMENT — ENCOUNTER SYMPTOMS
LOSS OF SENSATION IN FEET: 0
OCCASIONAL FEELINGS OF UNSTEADINESS: 0
DEPRESSION: 0

## 2025-08-05 NOTE — LETTER
August 5, 2025     Nannette Palacio MD  [address]  [facility] [address]    Patient: Mica Vanessa   YOB: 1955   Date of Visit: 8/5/2025       Dear Dr. Nannette Palacio MD:    Thank you for referring Mica Vanessa to me for evaluation. Below are my notes for this consultation.  If you have questions, please do not hesitate to call me. I look forward to following your patient along with you.       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    This is the patient's 1st visit with endocrine department.    03/31/2025 - started treatment pembrolizumab/carbo/taxol  03/2025 - biopsied the lung nodules, consistent with metastatic carcinoma  07/2024 - new pulmonary nodules < 1cm    She was recently hospitalized x 2 because of weakness, fatigue and AMS. She was also found to have a b/l ear infection which was treated with Abx. She was found to be hyponatremic, which was corrected with hydration. She was continued on Na tabs for while. She stopped the Na tabs 10 days ago. Her last chemo was 06/23. She usually takes dexamethasone 4 mg x 5 tabs the day before chemo and 4 mg x 2 for 3 days after chemo.    She states she feels pretty normal compared to how she felt prior to starting chemo in 03/2025    Energy - low, because she spent so much time in bed in 07/2025.  Can get the chores done, does work around the house, can go up and down the stairs. Best time is typically morning  Sleep - wakes up  refreshed, wakes up  intermittently through the night, easy to fall back asleep  Appetite - improving  Weight - lost 10 lbs, and has gained 4 lbs back  Bowels -  normal  Temp intolerance - denies  HF/NS - denies HF/NS  Muscle weakness - improving with day to day activities  Muscle cramps -  denies, + neuropathy in toes  Salt craving - likes salt, has always liked it  Dizziness - dizzy when nauseous,,, but improving  Headache - denies  Vision changes - denies  BP - stable at home, denies low readings  OTC meds - omeprazole in the am, decongestant occasionally. Denies topical or inhaled steroids.    Overall weaker compared to last year (15%)    ROS:  Negative unless noted above    Problem List[1]    Family History[2]    RX Allergies[3]    Surgical History[4]    Social History     Socioeconomic History   • Marital status: Legally      Spouse name: Not on file   • Number of children: Not on file   • Years of education: Not on file   • Highest education level: Not on file   Occupational History   • Not on file   Tobacco Use   • Smoking status: Former     Current packs/day: 0.00     Average packs/day: 0.5 packs/day for 32.0 years (16.0 ttl pk-yrs)     Types: Cigarettes     Start date: 1975     Quit date: 2007     Years since quittin.1   • Smokeless tobacco: Never   • Tobacco comments:     Used infrequent since    Vaping Use   • Vaping status: Former   Substance and Sexual Activity   • Alcohol use: Never   • Drug use: Not Currently     Types: Marijuana   • Sexual activity: Not Currently     Partners: Male     Birth control/protection: Female Sterilization   Other Topics Concern   • Not on file   Social History Narrative   • Not on file     Social Drivers of Health     Financial Resource Strain: Low Risk  (7/10/2025)    Overall Financial Resource Strain (CARDIA)    • Difficulty of Paying Living Expenses: Not very hard   Food Insecurity: No Food Insecurity (2025)    Received from [facility]    Hunger Vital Sign    • Within the past 12 months, you worried that your food would run out before you got the money to buy more.: Never true    • Within the past 12 months, the food you bought just didn't last and you didn't have money to get more.: Never true   Transportation Needs: No Transportation Needs (7/31/2025)    OASIS : Transportation    • Lack of Transportation (Medical): No    • Lack of Transportation (Non-Medical): No    • Patient Unable or Declines to Respond: No   Physical Activity: Insufficiently Active (8/21/2024)    Received from [facility]    Exercise Vital Sign    • On average, how many days per week do you engage in moderate to strenuous exercise (like a brisk walk)?: 4 days    • On average, how many minutes do you engage in exercise at this level?: 30 min   Stress: Stress Concern Present (8/21/2024)    Received from [facility]    [employer] - Occupational Stress Questionnaire    • Feeling of Stress : Rather much   Social Connections: Feeling Socially Integrated (7/31/2025)    OASIS : Social Isolation    • Frequency of experiencing loneliness or isolation: Never   Intimate Partner Violence: Not At Risk (7/9/2025)    Humiliation, Afraid, Rape, and Kick questionnaire    • Fear of Current or Ex-Partner: No    • Emotionally Abused: No    • Physically Abused: No    • Sexually Abused: No   Housing Stability: Unknown (7/17/2025)    Received from [facility]    Housing Stability Vital Sign    • In the last 12 months, was there a time when you were not able to pay the mortgage or rent on time?: No    • Number of Times Moved in the Last Year: Not on file    • At any time in the past 12 months, were you homeless or living in a shelter (including now)?: No     Vitals:  Vitals:    08/05/25 1246   BP: (!) 159/92   BP Location: Left arm   Patient Position: Sitting   BP Cuff Size: Adult   Pulse: 77   Weight: 58.5 kg (129 lb)   Height: 1.575 m (5' 2\") "     Physical Exam:    General: elderly female patient in NAD  HEENT: AT/NC, no buccal hyperpigmentation  Neck: trachea in midline, rubbery thyroid  Resp: CTA B/L  CVS: normal s1 and s2  Abdomen: soft and non tender to palpation, BS+  Skin: warm, dry and intact  Ext - strength 5/5 in UE B/L  Neuro: AAO x3, DTRs not delayed  Psych: cooperative    Medications:    Current Medications[5]    Labs:   Latest Reference Range & Units 03/28/25 15:09 05/09/25 09:56 06/23/25 07:58 07/21/25 14:31 07/24/25 08:06   Thyroid Stimulating Hormone 0.44 - 3.98 mIU/L 1.27 1.61 0.86  1.06   Cortisol  A.M. 5.0 - 20.0 ug/dL 13.3 15.0 2.7 (L) 26.4 (H) 33.4 (H)   Adrenocorticotropic Hormone (ACTH) 7.2 - 63.3 pg/mL 10.8 12.2 <1.5 (L)  50.1   (L): Data is abnormally low  (H): Data is abnormally high    Lab Results   Component Value Date     (L) 07/24/2025    K 3.9 07/24/2025    CL 94 (L) 07/24/2025    CO2 26 07/24/2025     Imaging:    Narrative & Impression   Interpreted By:  Fernando Capps,   STUDY:  CT CHEST ABDOMEN PELVIS W IV CONTRAST;  7/10/2025 9:55 am      INDICATION:  Signs/Symptoms:immunocompromised with fever.      COMPARISON:  05/28/2025      ACCESSION NUMBER(S):  [id number]      ORDERING CLINICIAN:  CHARY CHAUDHARY      TECHNIQUE:  CT of the chest, abdomen, and pelvis was performed.  Contiguous axial  images were obtained at 3 mm slice thickness through the chest,  abdomen and pelvis. Coronal and sagittal reconstructions at 3mm slice  thickness were performed.  75 mL Omnipaque 350 administered intravenously without immediate  complication.      FINDINGS:  CHEST:      LUNG/PLEURA/LARGE AIRWAYS:  Scattered tiny pulmonary nodules are unchanged. Mild bibasilar  atelectasis, otherwise the lungs are clear. No new nodules or areas  of consolidation. No pleural effusion or pneumothorax  The central airways are patent. No bronchiectasis.      VESSELS:  No central pulmonary embolus identified on nondedicated study.  Mild coronary  atherosclerosis.  Thoracic aorta and great vessels are mildly atherosclerotic but  otherwise patent without aneurysm.      HEART:  The heart is normal in size.   No significant pericardial effusion.      MEDIASTINUM AND BRETT:  No lymphadenopathy.  Small sliding hiatal hernia.      CHEST WALL AND LOWER NECK:  No lymphadenopathy.  Tiny cyst or nodule in the right thyroid lobe is not significantly  changed.      ABDOMEN:      LIVER:  Unremarkable.      BILE DUCTS:  Not dilated.      GALLBLADDER:  No calcified stones or definite inflammation.      PANCREAS:  Unremarkable.      SPLEEN:  Unremarkable.      ADRENAL GLANDS:  Unremarkable.      KIDNEYS AND URETERS:  Normal in cortical thickness without hydronephrosis.  A few cysts are  not significantly changed.      PELVIS:      BLADDER:  Partially distended.      REPRODUCTIVE ORGANS:  Uterus appears to be absent.      BOWEL:  No findings of bowel inflammation or obstruction.   Unremarkable  appendix.   Unremarkable mesentery.      VESSELS:  The abdominal aorta is patent without aneurysm.  The major visceral  arterial branches are patent.Severe atherosclerosis. The IVC and  major branches are grossly patent. The major portal venous branches  are grossly patent.      PERITONEUM/RETROPERITONEUM/LYMPH NODES:  No free fluid or free air.  No abdominal or pelvic lymphadenopathy.      BONE AND SOFT TISSUE:  No focal suspicious skeletal lesions.   Degenerative changes of the  spine.          IMPRESSION:  CHEST:  1.  No new active disease in the chest.  2. Scattered tiny pulmonary nodules are stable.      ABDOMEN-PELVIS:  1.  No acute findings of the abdomen or pelvis.          Signed by: Fernando Capps 7/10/2025 10:11 AM  Dictation workstation:   [id number]     Assessment and Plan:    The patient is a 69 year old female who presents to the office today to establish care with us for potential concerns for her adrenal as she was found to have low cortisol. Upon review of the labs, the  patient's chemo session was on 06/23, and she took dexamethasone tabs on 06/22 which resulted in a low reading of cortisol and ACTH which is expected to be low with dexamethasone as it shuts the HPA axis. The cortisol and ACTH post that on 07/21 and 07/24 while she was recovering from her hospitalization was reassuring that she does not have AI. Her cortisol appropriately gill to the stress response. Given everything we are not concerned about AI or low adrenal response. She has robust functioning of adrenal gland, no need for further imaging. She does not need to see us, and no need to check for adrenal function after giving dexamethasone.    The patient was seen and discussed with Dr. Rizzo.    Billy Alcala MD  PGY-5 Endocrinology Fellow        Attestation signed by Rita Rizzo MD at 8/5/2025  3:59 PM:  I saw and evaluated the patient. I personally obtained the key and critical portions of the history and physical exam or was physically present for key and critical portions performed by the resident/fellow. I reviewed the resident/fellow's documentation and discussed the patient with the resident/fellow. I agree with the resident/fellow's medical decision making as documented in the note.     [1]  Patient Active Problem List  Diagnosis   • Postmenopausal bleeding   • HTN (hypertension)   • Gastroesophageal reflux disease   • Abnormal EKG   • Angina pectoris   • Khan's esophagus   • Benign neoplasm of ciliary body   • Benign tumor of ciliary body, left   • Colon polyp, hyperplastic   • Hyperlipemia   • Malignant neoplasm of breast   • Nuclear senile cataract of left eye   • HTN (hypertension), benign   • Malignant neoplasm of endometrium (Multi)   • Upper respiratory tract infection   • Lung nodules   • History of malignant neoplasm of breast   • Seasonal allergies   • Chemotherapy management, encounter for   • Hyponatremia   [2]  Family History  Problem Relation Name Age of Onset   • Osteoporosis Mother      • Thyroid disease Mother     • Hypertension Father     • Breast cancer Sibling     • Breast cancer Daughter     • Stroke Other Grandparent    • Hypertension Other Grandparent    • Thyroid disease Mother Mine Sueroiller    • Arthritis Mother Mine Sueroiller    • Osteoporosis Mother Mine Nicholer    • Cancer Sister Sonia Spicer    • Breast cancer Sister Sonia Spicer    • Hypertension Father Juan Antonio Arredondo    • Cancer Sister Sonia Spicer    • Arthritis Mother Mine Lazo    • Cancer Sister Sonia Spicer    • Breast cancer Sister Sonia Spicer    • BRCA1 Negative Sister Sonia Spicer    • Hypertension Father Juan Antonio Arredondo    • Breast cancer Daughter Janet Vanessa    • BRCA1 Negative Daughter Janet Vanessa    • Cancer Sister Sonia Spicer    • Cancer Sister Sonia Spicer    [3]  No Known Allergies  [4]  Past Surgical History:  Procedure Laterality Date   • BREAST BIOPSY     • BREAST LUMPECTOMY     •  SECTION, LOW TRANSVERSE  83, 84, 91   • HYSTERECTOMY  2023   • LUMBAR PERITONEAL SHUNT     • OTHER SURGICAL HISTORY  2022     section   • OTHER SURGICAL HISTORY  2022    Tonsillectomy   • OTHER SURGICAL HISTORY  2022    Lumpectomy   • SKIN CANCER EXCISION      vulva   • TONSILLECTOMY     • TUBAL LIGATION     [5]    Current Outpatient Medications:   •  gemfibrozil (Lopid) 600 mg tablet, Take 1 tablet (600 mg) by mouth once daily., Disp: , Rfl:   •  lisinopril 10 mg tablet, Take 1 tablet (10 mg) by mouth once daily. (Patient taking differently: Take 2 tablets (20 mg) by mouth once daily.), Disp: 100 tablet, Rfl: 3  •  magnesium gluconate (Magonate) 27 mg magnesium (500 mg) tablet, Take 2 tablets (54 mg) by mouth 2 times a day., Disp: 120 tablet, Rfl: 5  •  metoclopramide (Reglan) 10 mg tablet, Take 1 tablet (10 mg) by mouth every 6 hours if needed (nausea) for up to 10 days., Disp: 40 tablet, Rfl: 0  •  prochlorperazine (Compazine) 10 mg tablet, Take 1 tablet (10 mg) by mouth every 6  hours if needed for nausea or vomiting., Disp: 30 tablet, Rfl: 5  •  propranolol (Inderal) 10 mg tablet, TAKE 1 TABLET (10 MG) BY MOUTH 2 TIMES A DAY AS NEEDED (ANXIETY, PALPITATIONS)., Disp: , Rfl:   •  sennosides (senna) 8.6 mg capsule, Take 1 capsule (8.6 mg) by mouth once daily at bedtime., Disp: 30 capsule, Rfl: 2  •  [id number] [id number] no.225 [phone] mg-mcg-mcg-mg tablet, Take 100 mg by mouth once daily. Indications: symptoms associated with chemo, Disp: , Rfl:   •  biotin 10,000 mcg capsule, Take 1 capsule (10 mg) by mouth once daily. (Patient not taking: Reported on 8/5/2025), Disp: , Rfl:   •  escitalopram (Lexapro) 10 mg tablet, Take 1 tablet (10 mg) by mouth once daily., Disp: 30 tablet, Rfl: 5  •  loratadine (Claritin) 10 mg tablet, Take 1 tablet (10 mg) by mouth once daily as needed for allergies., Disp: , Rfl:   •  lysine 1,000 mg tablet, Take 1 tablet (1,000 mg) by mouth once daily., Disp: , Rfl:   •  ondansetron (Zofran) 8 mg tablet, Take 1 tablet (8 mg) by mouth every 8 hours if needed for nausea or vomiting., Disp: 30 tablet, Rfl: 5  •  sodium chloride 1,000 mg tablet, Take 1 tablet (1 g) by mouth 2 times a day. (Patient not taking: Reported on 8/5/2025), Disp: 60 tablet, Rfl: 2       [1]  Patient Active Problem List  Diagnosis   • Postmenopausal bleeding   • HTN (hypertension)   • Gastroesophageal reflux disease   • Abnormal EKG   • Angina pectoris   • Khan's esophagus   • Benign neoplasm of ciliary body   • Benign tumor of ciliary body, left   • Colon polyp, hyperplastic   • Hyperlipemia   • Malignant neoplasm of breast   • Nuclear senile cataract of left eye   • HTN (hypertension), benign   • Malignant neoplasm of endometrium (Multi)   • Upper respiratory tract infection   • Lung nodules   • History of malignant neoplasm of breast   • Seasonal allergies   • Chemotherapy management, encounter for   • Hyponatremia   [2]  Family History  Problem Relation Name Age of Onset   •  Osteoporosis Mother     • Thyroid disease Mother     • Hypertension Father     • Breast cancer Sibling     • Breast cancer Daughter     • Stroke Other Grandparent    • Hypertension Other Grandparent    • Thyroid disease Mother Mine Sueroiller    • Arthritis Mother Mine Nicholer    • Osteoporosis Mother Mine Nicholer    • Cancer Sister Sonia Spicer    • Breast cancer Sister Sonia Spicer    • Hypertension Father Juan Antonio Arredondo    • Cancer Sister Sonia Spicer    • Arthritis Mother Mine Lazo    • Cancer Sister Sonia Spicer    • Breast cancer Sister Sonia Spicer    • BRCA1 Negative Sister Sonia Spicer    • Hypertension Father Juan Antonio Arredondo    • Breast cancer Daughter Janet Vanessa    • BRCA1 Negative Daughter Janet Vanessa    • Cancer Sister Sonia Spicer    • Cancer Sister Sonia Spicer    [3]  No Known Allergies  [4]  Past Surgical History:  Procedure Laterality Date   • BREAST BIOPSY     • BREAST LUMPECTOMY     •  SECTION, LOW TRANSVERSE  83, 84, 91   • HYSTERECTOMY  2023   • LUMBAR PERITONEAL SHUNT     • OTHER SURGICAL HISTORY  2022     section   • OTHER SURGICAL HISTORY  2022    Tonsillectomy   • OTHER SURGICAL HISTORY  2022    Lumpectomy   • SKIN CANCER EXCISION      vulva   • TONSILLECTOMY  1960   • TUBAL LIGATION     [5]    Current Outpatient Medications:   •  gemfibrozil (Lopid) 600 mg tablet, Take 1 tablet (600 mg) by mouth once daily., Disp: , Rfl:   •  lisinopril 10 mg tablet, Take 1 tablet (10 mg) by mouth once daily. (Patient taking differently: Take 2 tablets (20 mg) by mouth once daily.), Disp: 100 tablet, Rfl: 3  •  magnesium gluconate (Magonate) 27 mg magnesium (500 mg) tablet, Take 2 tablets (54 mg) by mouth 2 times a day., Disp: 120 tablet, Rfl: 5  •  metoclopramide (Reglan) 10 mg tablet, Take 1 tablet (10 mg) by mouth every 6 hours if needed (nausea) for up to 10 days., Disp: 40 tablet, Rfl: 0  •  prochlorperazine (Compazine) 10 mg tablet, Take 1 tablet (10  mg) by mouth every 6 hours if needed for nausea or vomiting., Disp: 30 tablet, Rfl: 5  •  propranolol (Inderal) 10 mg tablet, TAKE 1 TABLET (10 MG) BY MOUTH 2 TIMES A DAY AS NEEDED (ANXIETY, PALPITATIONS)., Disp: , Rfl:   •  sennosides (senna) 8.6 mg capsule, Take 1 capsule (8.6 mg) by mouth once daily at bedtime., Disp: 30 capsule, Rfl: 2  •  [id number] no.225 [phone] mg-mcg-mcg-mg tablet, Take 100 mg by mouth once daily. Indications: symptoms associated with chemo, Disp: , Rfl:   •  biotin 10,000 mcg capsule, Take 1 capsule (10 mg) by mouth once daily. (Patient not taking: Reported on 8/5/2025), Disp: , Rfl:   •  escitalopram (Lexapro) 10 mg tablet, Take 1 tablet (10 mg) by mouth once daily., Disp: 30 tablet, Rfl: 5  •  loratadine (Claritin) 10 mg tablet, Take 1 tablet (10 mg) by mouth once daily as needed for allergies., Disp: , Rfl:   •  lysine 1,000 mg tablet, Take 1 tablet (1,000 mg) by mouth once daily., Disp: , Rfl:   •  ondansetron (Zofran) 8 mg tablet, Take 1 tablet (8 mg) by mouth every 8 hours if needed for nausea or vomiting., Disp: 30 tablet, Rfl: 5  •  sodium chloride 1,000 mg tablet, Take 1 tablet (1 g) by mouth 2 times a day. (Patient not taking: Reported on 8/5/2025), Disp: 60 tablet, Rfl: 2

## 2025-08-05 NOTE — PROGRESS NOTES
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    This is the patient's 1st visit with endocrine department.    03/31/2025 - started treatment pembrolizumab/carbo/taxol  03/2025 - biopsied the lung nodules, consistent with metastatic carcinoma  07/2024 - new pulmonary nodules < 1cm    She was recently hospitalized x 2 because of weakness, fatigue and AMS. She was also found to have a b/l ear infection which was treated with Abx. She was found to be hyponatremic, which was corrected with hydration. She was continued on Na tabs for while. She stopped the Na tabs 10 days ago. Her last chemo was 06/23. She usually takes dexamethasone 4 mg x 5 tabs the day before chemo and 4 mg x 2 for 3 days after chemo.    She states she feels pretty normal compared to how she felt prior to starting chemo in 03/2025    Energy - low, because she spent so much time in bed in 07/2025.  Can get the chores done, does work around the house, can go up and down the stairs. Best time is typically morning  Sleep - wakes up refreshed, wakes up  intermittently through the night, easy to fall back asleep  Appetite - improving  Weight - lost 10 lbs, and has gained 4 lbs back  Bowels -  normal  Temp intolerance - denies  HF/NS - denies HF/NS  Muscle weakness - improving with day to day activities  Muscle cramps -  denies, + neuropathy in toes  Salt craving - likes salt, has always liked it  Dizziness - dizzy when nauseous,,, but improving  Headache - denies  Vision changes - denies  BP - stable at home, denies low readings  OTC meds - omeprazole in the am, decongestant occasionally.  Denies topical or inhaled steroids.    Overall weaker compared to last year (15%)    ROS:  Negative unless noted above    Problem List[1]    Family History[2]    RX Allergies[3]    Surgical History[4]    Social History     Socioeconomic History    Marital status: Legally      Spouse name: Not on file    Number of children: Not on file    Years of education: Not on file    Highest education level: Not on file   Occupational History    Not on file   Tobacco Use    Smoking status: Former     Current packs/day: 0.00     Average packs/day: 0.5 packs/day for 32.0 years (16.0 ttl pk-yrs)     Types: Cigarettes     Start date: 1975     Quit date: 2007     Years since quittin.1    Smokeless tobacco: Never    Tobacco comments:     Used infrequent since    Vaping Use    Vaping status: Former   Substance and Sexual Activity    Alcohol use: Never    Drug use: Not Currently     Types: Marijuana    Sexual activity: Not Currently     Partners: Male     Birth control/protection: Female Sterilization   Other Topics Concern    Not on file   Social History Narrative    Not on file     Social Drivers of Health     Financial Resource Strain: Low Risk  (7/10/2025)    Overall Financial Resource Strain (CARDIA)     Difficulty of Paying Living Expenses: Not very hard   Food Insecurity: No Food Insecurity (2025)    Received from [facility]    Hunger Vital Sign     Within the past 12 months, you worried that your food would run out before you got the money to buy more.: Never true     Within the past 12 months, the food you bought just didn't last and you didn't have money to get more.: Never true   Transportation Needs: No Transportation Needs (2025)    OASIS : Transportation     Lack of Transportation (Medical): No     Lack of Transportation (Non-Medical): No     Patient Unable or Declines to Respond: No   Physical Activity: Insufficiently Active (2024)    Received from [facility]     "Exercise Vital Sign     On average, how many days per week do you engage in moderate to strenuous exercise (like a brisk walk)?: 4 days     On average, how many minutes do you engage in exercise at this level?: 30 min   Stress: Stress Concern Present (8/21/2024)    [facility] [place] - Occupational Stress Questionnaire     Feeling of Stress : Rather much   Social Connections: Feeling Socially Integrated (7/31/2025)    OASIS : Social Isolation     Frequency of experiencing loneliness or isolation: Never   Intimate Partner Violence: Not At Risk (7/9/2025)    Humiliation, Afraid, Rape, and Kick questionnaire     Fear of Current or Ex-Partner: No     Emotionally Abused: No     Physically Abused: No     Sexually Abused: No   Housing Stability: Unknown (7/17/2025)    Received from [facility]    Housing Stability Vital Sign     In the last 12 months, was there a time when you were not able to pay the mortgage or rent on time?: No     Number of Times Moved in the Last Year: Not on file     At any time in the past 12 months, were you homeless or living in a shelter (including now)?: No     Vitals:  Vitals:    08/05/25 1246   BP: (!) 159/92   BP Location: Left arm   Patient Position: Sitting   BP Cuff Size: Adult   Pulse: 77   Weight: 58.5 kg (129 lb)   Height: 1.575 m (5' 2\")     Physical Exam:    General: elderly female patient in NAD  HEENT: AT/NC, no buccal hyperpigmentation  Neck: trachea in midline, rubbery thyroid  Resp: CTA B/L  CVS: normal s1 and s2  Abdomen: soft and non tender to palpation, BS+  Skin: warm, dry and intact  Ext - strength 5/5 in UE B/L  Neuro: AAO x3, DTRs not delayed  Psych: cooperative    Medications:    Current Medications[5]    Labs:   Latest Reference Range & Units 03/28/25 15:09 05/09/25 09:56 06/23/25 07:58 07/21/25 14:31 07/24/25 08:06   Thyroid Stimulating Hormone 0.44 - 3.98 mIU/L 1.27 1.61 0.86  1.06   Cortisol  A.M. 5.0 - " 20.0 ug/dL 13.3 15.0 2.7 (L) 26.4 (H) 33.4 (H)   Adrenocorticotropic Hormone (ACTH) 7.2 - 63.3 pg/mL 10.8 12.2 <1.5 (L)  50.1   (L): Data is abnormally low  (H): Data is abnormally high    Lab Results   Component Value Date     (L) 07/24/2025    K 3.9 07/24/2025    CL 94 (L) 07/24/2025    CO2 26 07/24/2025     Imaging:    Narrative & Impression   Interpreted By:  Fernando Capps,   STUDY:  CT CHEST ABDOMEN PELVIS W IV CONTRAST;  7/10/2025 9:55 am      INDICATION:  Signs/Symptoms:immunocompromised with fever.      COMPARISON:  05/28/2025      ACCESSION NUMBER(S):  [id number]      ORDERING CLINICIAN:  CHARY CHAUDHARY      TECHNIQUE:  CT of the chest, abdomen, and pelvis was performed.  Contiguous axial  images were obtained at 3 mm slice thickness through the chest,  abdomen and pelvis. Coronal and sagittal reconstructions at 3mm slice  thickness were performed.  75 mL Omnipaque 350 administered intravenously without immediate  complication.      FINDINGS:  CHEST:      LUNG/PLEURA/LARGE AIRWAYS:  Scattered tiny pulmonary nodules are unchanged. Mild bibasilar  atelectasis, otherwise the lungs are clear. No new nodules or areas  of consolidation. No pleural effusion or pneumothorax  The central airways are patent. No bronchiectasis.      VESSELS:  No central pulmonary embolus identified on nondedicated study.  Mild coronary atherosclerosis.  Thoracic aorta and great vessels are mildly atherosclerotic but  otherwise patent without aneurysm.      HEART:  The heart is normal in size.   No significant pericardial effusion.      MEDIASTINUM AND BRETT:  No lymphadenopathy.  Small sliding hiatal hernia.      CHEST WALL AND LOWER NECK:  No lymphadenopathy.  Tiny cyst or nodule in the right thyroid lobe is not significantly  changed.      ABDOMEN:      LIVER:  Unremarkable.      BILE DUCTS:  Not dilated.      GALLBLADDER:  No calcified stones or definite inflammation.      PANCREAS:  Unremarkable.       SPLEEN:  Unremarkable.      ADRENAL GLANDS:  Unremarkable.      KIDNEYS AND URETERS:  Normal in cortical thickness without hydronephrosis.  A few cysts are  not significantly changed.      PELVIS:      BLADDER:  Partially distended.      REPRODUCTIVE ORGANS:  Uterus appears to be absent.      BOWEL:  No findings of bowel inflammation or obstruction.   Unremarkable  appendix.   Unremarkable mesentery.      VESSELS:  The abdominal aorta is patent without aneurysm.  The major visceral  arterial branches are patent.Severe atherosclerosis. The IVC and  major branches are grossly patent. The major portal venous branches  are grossly patent.      PERITONEUM/RETROPERITONEUM/LYMPH NODES:  No free fluid or free air.  No abdominal or pelvic lymphadenopathy.      BONE AND SOFT TISSUE:  No focal suspicious skeletal lesions.   Degenerative changes of the  spine.          IMPRESSION:  CHEST:  1.  No new active disease in the chest.  2. Scattered tiny pulmonary nodules are stable.      ABDOMEN-PELVIS:  1.  No acute findings of the abdomen or pelvis.          Signed by: Fernando Capps 7/10/2025 10:11 AM  Dictation workstation:   [id number]     Assessment and Plan:    The patient is a 69 year old female who presents to the office today to establish care with us for potential concerns for her adrenal as she was found to have low cortisol. Upon review of the labs, the patient's chemo session was on 06/23, and she took dexamethasone tabs on 06/22 which resulted in a low reading of cortisol and ACTH which is expected to be low with dexamethasone as it shuts the HPA axis. The cortisol and ACTH post that on 07/21 and 07/24 while she was recovering from her hospitalization was reassuring that she does not have AI. Her cortisol appropriately gill to the stress response. Given everything we are not concerned about AI or low adrenal response. She has robust functioning of adrenal gland, no need for further imaging. She does not need to see  us, and no need to check for adrenal function after giving dexamethasone.    The patient was seen and discussed with Dr. Rizzo.    Billy Alcala MD  PGY-5 Endocrinology Fellow         [1]   Patient Active Problem List  Diagnosis    Postmenopausal bleeding    HTN (hypertension)    Gastroesophageal reflux disease    Abnormal EKG    Angina pectoris    Khan's esophagus    Benign neoplasm of ciliary body    Benign tumor of ciliary body, left    Colon polyp, hyperplastic    Hyperlipemia    Malignant neoplasm of breast    Nuclear senile cataract of left eye    HTN (hypertension), benign    Malignant neoplasm of endometrium (Multi)    Upper respiratory tract infection    Lung nodules    History of malignant neoplasm of breast    Seasonal allergies    Chemotherapy management, encounter for    Hyponatremia   [2]   Family History  Problem Relation Name Age of Onset    Osteoporosis Mother      Thyroid disease Mother      Hypertension Father      Breast cancer Sibling      Breast cancer Daughter      Stroke Other Grandparent     Hypertension Other Grandparent     Thyroid disease Mother Mine Dannemiller     Arthritis Mother Mine Dannemiller     Osteoporosis Mother Mine Dannemiller     Cancer Sister Sonia Spicer     Breast cancer Sister Sonia Spicer     Hypertension Father Juan Antonio Gerst     Cancer Sister Sonia Spicer     Arthritis Mother Mine Dannemiller     Cancer Sister Sonia Martins     Breast cancer Sister Sonia Martins     BRCA1 Negative Sister Sonia Spicer     Hypertension Father Juan Antonio Ger     Breast cancer Daughter Janet Vanessa     BRCA1 Negative Daughter Janet Vanessa     Cancer Sister Sonia Spicer     Cancer Sister Sonia Spicer    [3] No Known Allergies  [4]   Past Surgical History:  Procedure Laterality Date    BREAST BIOPSY      BREAST LUMPECTOMY       SECTION, LOW TRANSVERSE  83, 84, 91    HYSTERECTOMY  2023    LUMBAR PERITONEAL SHUNT      OTHER SURGICAL HISTORY  2022     section    OTHER SURGICAL  HISTORY  11/23/2022    Tonsillectomy    OTHER SURGICAL HISTORY  11/23/2022    Lumpectomy    SKIN CANCER EXCISION  2002    vulva    TONSILLECTOMY  1960    TUBAL LIGATION  1991   [5]   Current Outpatient Medications:     gemfibrozil (Lopid) 600 mg tablet, Take 1 tablet (600 mg) by mouth once daily., Disp: , Rfl:     lisinopril 10 mg tablet, Take 1 tablet (10 mg) by mouth once daily. (Patient taking differently: Take 2 tablets (20 mg) by mouth once daily.), Disp: 100 tablet, Rfl: 3    magnesium gluconate (Magonate) 27 mg magnesium (500 mg) tablet, Take 2 tablets (54 mg) by mouth 2 times a day., Disp: 120 tablet, Rfl: 5    metoclopramide (Reglan) 10 mg tablet, Take 1 tablet (10 mg) by mouth every 6 hours if needed (nausea) for up to 10 days., Disp: 40 tablet, Rfl: 0    prochlorperazine (Compazine) 10 mg tablet, Take 1 tablet (10 mg) by mouth every 6 hours if needed for nausea or vomiting., Disp: 30 tablet, Rfl: 5    propranolol (Inderal) 10 mg tablet, TAKE 1 TABLET (10 MG) BY MOUTH 2 TIMES A DAY AS NEEDED (ANXIETY, PALPITATIONS)., Disp: , Rfl:     sennosides (senna) 8.6 mg capsule, Take 1 capsule (8.6 mg) by mouth once daily at bedtime., Disp: 30 capsule, Rfl: 2    [id number] no.[id number] mg-mcg-mcg-mg tablet, Take 100 mg by mouth once daily. Indications: symptoms associated with chemo, Disp: , Rfl:     biotin 10,000 mcg capsule, Take 1 capsule (10 mg) by mouth once daily. (Patient not taking: Reported on 8/5/2025), Disp: , Rfl:     escitalopram (Lexapro) 10 mg tablet, Take 1 tablet (10 mg) by mouth once daily., Disp: 30 tablet, Rfl: 5    loratadine (Claritin) 10 mg tablet, Take 1 tablet (10 mg) by mouth once daily as needed for allergies., Disp: , Rfl:     lysine 1,000 mg tablet, Take 1 tablet (1,000 mg) by mouth once daily., Disp: , Rfl:     ondansetron (Zofran) 8 mg tablet, Take 1 tablet (8 mg) by mouth every 8 hours if needed for nausea or vomiting., Disp: 30 tablet, Rfl: 5    sodium chloride  1,000 mg tablet, Take 1 tablet (1 g) by mouth 2 times a day. (Patient not taking: Reported on 8/5/2025), Disp: 60 tablet, Rfl: 2

## 2025-08-11 ENCOUNTER — APPOINTMENT (OUTPATIENT)
Dept: GYNECOLOGIC ONCOLOGY | Facility: CLINIC | Age: 70
End: 2025-08-11
Payer: MEDICARE

## 2025-08-11 ENCOUNTER — APPOINTMENT (OUTPATIENT)
Dept: HEMATOLOGY/ONCOLOGY | Facility: CLINIC | Age: 70
End: 2025-08-11
Payer: MEDICARE

## 2025-08-15 ENCOUNTER — TELEMEDICINE (OUTPATIENT)
Dept: GYNECOLOGIC ONCOLOGY | Facility: HOSPITAL | Age: 70
End: 2025-08-15
Payer: MEDICARE

## 2025-08-15 DIAGNOSIS — Z51.11 CHEMOTHERAPY MANAGEMENT, ENCOUNTER FOR: Primary | ICD-10-CM

## 2025-08-15 DIAGNOSIS — C54.1 MALIGNANT NEOPLASM OF ENDOMETRIUM (MULTI): ICD-10-CM

## 2025-08-15 PROCEDURE — G2211 COMPLEX E/M VISIT ADD ON: HCPCS | Performed by: OBSTETRICS & GYNECOLOGY

## 2025-08-15 PROCEDURE — 1160F RVW MEDS BY RX/DR IN RCRD: CPT | Performed by: OBSTETRICS & GYNECOLOGY

## 2025-08-15 PROCEDURE — 99212 OFFICE O/P EST SF 10 MIN: CPT | Performed by: OBSTETRICS & GYNECOLOGY

## 2025-08-15 PROCEDURE — 1159F MED LIST DOCD IN RCRD: CPT | Performed by: OBSTETRICS & GYNECOLOGY

## 2025-08-18 ENCOUNTER — TELEPHONE (OUTPATIENT)
Dept: GYNECOLOGIC ONCOLOGY | Facility: HOSPITAL | Age: 70
End: 2025-08-18
Payer: MEDICARE

## 2025-08-18 DIAGNOSIS — C54.1 MALIGNANT NEOPLASM OF ENDOMETRIUM (MULTI): ICD-10-CM

## 2025-08-19 RX ORDER — FAMOTIDINE 10 MG/ML
20 INJECTION, SOLUTION INTRAVENOUS ONCE AS NEEDED
OUTPATIENT
Start: 2025-08-26

## 2025-08-19 RX ORDER — ALBUTEROL SULFATE 0.83 MG/ML
3 SOLUTION RESPIRATORY (INHALATION) AS NEEDED
OUTPATIENT
Start: 2025-08-26

## 2025-08-19 RX ORDER — PROCHLORPERAZINE MALEATE 10 MG
10 TABLET ORAL EVERY 6 HOURS PRN
OUTPATIENT
Start: 2025-08-26

## 2025-08-19 RX ORDER — DIPHENHYDRAMINE HYDROCHLORIDE 50 MG/ML
50 INJECTION, SOLUTION INTRAMUSCULAR; INTRAVENOUS AS NEEDED
OUTPATIENT
Start: 2025-08-26

## 2025-08-19 RX ORDER — EPINEPHRINE 0.3 MG/.3ML
0.3 INJECTION SUBCUTANEOUS EVERY 5 MIN PRN
OUTPATIENT
Start: 2025-08-26

## 2025-08-19 RX ORDER — PROCHLORPERAZINE EDISYLATE 5 MG/ML
10 INJECTION INTRAMUSCULAR; INTRAVENOUS EVERY 6 HOURS PRN
OUTPATIENT
Start: 2025-08-26

## 2025-08-22 ENCOUNTER — LAB (OUTPATIENT)
Dept: LAB | Facility: CLINIC | Age: 70
End: 2025-08-22
Payer: MEDICARE

## 2025-08-22 ENCOUNTER — APPOINTMENT (OUTPATIENT)
Dept: PRIMARY CARE | Facility: CLINIC | Age: 70
End: 2025-08-22
Payer: MEDICARE

## 2025-08-22 VITALS
HEART RATE: 113 BPM | BODY MASS INDEX: 24.03 KG/M2 | OXYGEN SATURATION: 96 % | RESPIRATION RATE: 16 BRPM | SYSTOLIC BLOOD PRESSURE: 123 MMHG | DIASTOLIC BLOOD PRESSURE: 75 MMHG | WEIGHT: 131.38 LBS | TEMPERATURE: 97.2 F

## 2025-08-22 DIAGNOSIS — C54.1 MALIGNANT NEOPLASM OF ENDOMETRIUM (MULTI): ICD-10-CM

## 2025-08-22 DIAGNOSIS — E78.5 HYPERLIPIDEMIA, UNSPECIFIED HYPERLIPIDEMIA TYPE: ICD-10-CM

## 2025-08-22 DIAGNOSIS — E87.1 HYPONATREMIA: ICD-10-CM

## 2025-08-22 DIAGNOSIS — H69.92 DYSFUNCTION OF LEFT EUSTACHIAN TUBE: Primary | ICD-10-CM

## 2025-08-22 DIAGNOSIS — I10 HYPERTENSION, BENIGN: ICD-10-CM

## 2025-08-22 LAB
ALBUMIN SERPL BCP-MCNC: 4.8 G/DL (ref 3.4–5)
ALP SERPL-CCNC: 68 U/L (ref 33–136)
ALT SERPL W P-5'-P-CCNC: 13 U/L (ref 7–45)
ANION GAP SERPL CALC-SCNC: 14 MMOL/L (ref 10–20)
AST SERPL W P-5'-P-CCNC: 14 U/L (ref 9–39)
BASOPHILS # BLD AUTO: 0 X10*3/UL (ref 0–0.1)
BASOPHILS NFR BLD AUTO: 0 %
BILIRUB SERPL-MCNC: 0.5 MG/DL (ref 0–1.2)
BUN SERPL-MCNC: 15 MG/DL (ref 6–23)
CALCIUM SERPL-MCNC: 10.1 MG/DL (ref 8.6–10.3)
CHLORIDE SERPL-SCNC: 104 MMOL/L (ref 98–107)
CO2 SERPL-SCNC: 27 MMOL/L (ref 21–32)
CREAT SERPL-MCNC: 0.61 MG/DL (ref 0.5–1.05)
EGFRCR SERPLBLD CKD-EPI 2021: >90 ML/MIN/1.73M*2
EOSINOPHIL # BLD AUTO: 0.13 X10*3/UL (ref 0–0.4)
EOSINOPHIL NFR BLD AUTO: 3.1 %
ERYTHROCYTE [DISTWIDTH] IN BLOOD BY AUTOMATED COUNT: 11.7 % (ref 11.5–14.5)
GLUCOSE SERPL-MCNC: 88 MG/DL (ref 74–99)
HCT VFR BLD AUTO: 42.7 % (ref 36–46)
HGB BLD-MCNC: 13.9 G/DL (ref 12–16)
IMM GRANULOCYTES # BLD AUTO: 0.01 X10*3/UL (ref 0–0.5)
IMM GRANULOCYTES NFR BLD AUTO: 0.2 % (ref 0–0.9)
LYMPHOCYTES # BLD AUTO: 0.96 X10*3/UL (ref 0.8–3)
LYMPHOCYTES NFR BLD AUTO: 23 %
MCH RBC QN AUTO: 31.1 PG (ref 26–34)
MCHC RBC AUTO-ENTMCNC: 32.6 G/DL (ref 32–36)
MCV RBC AUTO: 96 FL (ref 80–100)
MONOCYTES # BLD AUTO: 0.42 X10*3/UL (ref 0.05–0.8)
MONOCYTES NFR BLD AUTO: 10.1 %
NEUTROPHILS # BLD AUTO: 2.65 X10*3/UL (ref 1.6–5.5)
NEUTROPHILS NFR BLD AUTO: 63.6 %
NRBC BLD-RTO: ABNORMAL /100{WBCS}
PLATELET # BLD AUTO: 294 X10*3/UL (ref 150–450)
POTASSIUM SERPL-SCNC: 3.5 MMOL/L (ref 3.5–5.3)
PROT SERPL-MCNC: 7 G/DL (ref 6.4–8.2)
RBC # BLD AUTO: 4.47 X10*6/UL (ref 4–5.2)
SODIUM SERPL-SCNC: 141 MMOL/L (ref 136–145)
TSH SERPL-ACNC: 1.65 MIU/L (ref 0.44–3.98)
WBC # BLD AUTO: 4.2 X10*3/UL (ref 4.4–11.3)

## 2025-08-22 PROCEDURE — 82533 TOTAL CORTISOL: CPT

## 2025-08-22 PROCEDURE — 36415 COLL VENOUS BLD VENIPUNCTURE: CPT

## 2025-08-22 PROCEDURE — 84443 ASSAY THYROID STIM HORMONE: CPT

## 2025-08-22 PROCEDURE — 82024 ASSAY OF ACTH: CPT

## 2025-08-22 PROCEDURE — 86304 IMMUNOASSAY TUMOR CA 125: CPT

## 2025-08-22 PROCEDURE — 85025 COMPLETE CBC W/AUTO DIFF WBC: CPT

## 2025-08-22 PROCEDURE — 80053 COMPREHEN METABOLIC PANEL: CPT

## 2025-08-22 RX ORDER — LISINOPRIL 10 MG/1
20 TABLET ORAL DAILY
Qty: 180 TABLET | Refills: 1 | Status: SHIPPED | OUTPATIENT
Start: 2025-08-22

## 2025-08-22 RX ORDER — GEMFIBROZIL 600 MG/1
600 TABLET, FILM COATED ORAL DAILY
Qty: 90 TABLET | Refills: 0 | Status: SHIPPED | OUTPATIENT
Start: 2025-08-22

## 2025-08-22 RX ORDER — LISINOPRIL 10 MG/1
20 TABLET ORAL DAILY
COMMUNITY
End: 2025-08-22 | Stop reason: SDUPTHER

## 2025-08-23 LAB
CANCER AG125 SERPL-ACNC: 11.6 U/ML (ref 0–30.2)
CORTIS AM PEAK SERPL-MCNC: 7 UG/DL (ref 5–20)

## 2025-08-24 LAB — ACTH PLAS-MCNC: 9.5 PG/ML (ref 7.2–63.3)

## 2025-08-26 ENCOUNTER — INFUSION (OUTPATIENT)
Dept: HEMATOLOGY/ONCOLOGY | Facility: CLINIC | Age: 70
End: 2025-08-26
Payer: MEDICARE

## 2025-08-26 VITALS
DIASTOLIC BLOOD PRESSURE: 75 MMHG | RESPIRATION RATE: 16 BRPM | OXYGEN SATURATION: 94 % | HEART RATE: 87 BPM | WEIGHT: 132.06 LBS | TEMPERATURE: 97.9 F | BODY MASS INDEX: 24.15 KG/M2 | SYSTOLIC BLOOD PRESSURE: 123 MMHG

## 2025-08-26 DIAGNOSIS — C54.1 MALIGNANT NEOPLASM OF ENDOMETRIUM (MULTI): ICD-10-CM

## 2025-08-26 PROCEDURE — 2500000004 HC RX 250 GENERAL PHARMACY W/ HCPCS (ALT 636 FOR OP/ED): Mod: JZ,TB | Performed by: OBSTETRICS & GYNECOLOGY

## 2025-08-26 PROCEDURE — 96413 CHEMO IV INFUSION 1 HR: CPT

## 2025-08-26 RX ORDER — PROCHLORPERAZINE MALEATE 10 MG
10 TABLET ORAL EVERY 6 HOURS PRN
Status: DISCONTINUED | OUTPATIENT
Start: 2025-08-26 | End: 2025-08-26 | Stop reason: HOSPADM

## 2025-08-26 RX ORDER — ALBUTEROL SULFATE 0.83 MG/ML
3 SOLUTION RESPIRATORY (INHALATION) AS NEEDED
Status: DISCONTINUED | OUTPATIENT
Start: 2025-08-26 | End: 2025-08-26 | Stop reason: HOSPADM

## 2025-08-26 RX ORDER — FAMOTIDINE 10 MG/ML
20 INJECTION, SOLUTION INTRAVENOUS ONCE AS NEEDED
Status: DISCONTINUED | OUTPATIENT
Start: 2025-08-26 | End: 2025-08-26 | Stop reason: HOSPADM

## 2025-08-26 RX ORDER — HEPARIN SODIUM,PORCINE/PF 10 UNIT/ML
50 SYRINGE (ML) INTRAVENOUS AS NEEDED
OUTPATIENT
Start: 2025-08-26

## 2025-08-26 RX ORDER — PROCHLORPERAZINE EDISYLATE 5 MG/ML
10 INJECTION INTRAMUSCULAR; INTRAVENOUS EVERY 6 HOURS PRN
Status: DISCONTINUED | OUTPATIENT
Start: 2025-08-26 | End: 2025-08-26 | Stop reason: HOSPADM

## 2025-08-26 RX ORDER — HEPARIN 100 UNIT/ML
500 SYRINGE INTRAVENOUS AS NEEDED
OUTPATIENT
Start: 2025-08-26

## 2025-08-26 RX ORDER — EPINEPHRINE 0.3 MG/.3ML
0.3 INJECTION SUBCUTANEOUS EVERY 5 MIN PRN
Status: DISCONTINUED | OUTPATIENT
Start: 2025-08-26 | End: 2025-08-26 | Stop reason: HOSPADM

## 2025-08-26 RX ORDER — DIPHENHYDRAMINE HYDROCHLORIDE 50 MG/ML
50 INJECTION, SOLUTION INTRAMUSCULAR; INTRAVENOUS AS NEEDED
Status: DISCONTINUED | OUTPATIENT
Start: 2025-08-26 | End: 2025-08-26 | Stop reason: HOSPADM

## 2025-08-26 RX ADMIN — SODIUM CHLORIDE 400 MG: 9 INJECTION, SOLUTION INTRAVENOUS at 15:43

## 2025-08-26 ASSESSMENT — PAIN SCALES - GENERAL: PAINLEVEL_OUTOF10: 0-NO PAIN

## 2025-09-02 ENCOUNTER — APPOINTMENT (OUTPATIENT)
Dept: INFECTIOUS DISEASES | Facility: CLINIC | Age: 70
End: 2025-09-02
Payer: MEDICARE

## 2025-09-15 ENCOUNTER — APPOINTMENT (OUTPATIENT)
Dept: HEMATOLOGY/ONCOLOGY | Facility: CLINIC | Age: 70
End: 2025-09-15
Payer: MEDICARE

## 2025-09-15 ENCOUNTER — APPOINTMENT (OUTPATIENT)
Dept: GYNECOLOGIC ONCOLOGY | Facility: CLINIC | Age: 70
End: 2025-09-15
Payer: MEDICARE

## 2025-09-22 ENCOUNTER — APPOINTMENT (OUTPATIENT)
Dept: GYNECOLOGIC ONCOLOGY | Facility: CLINIC | Age: 70
End: 2025-09-22
Payer: MEDICARE

## 2025-09-22 ENCOUNTER — APPOINTMENT (OUTPATIENT)
Dept: HEMATOLOGY/ONCOLOGY | Facility: CLINIC | Age: 70
End: 2025-09-22
Payer: MEDICARE

## (undated) DEVICE — DEVICE, RESECTING, SMOL,  WITH HANDSET AVETA, 2.9MM

## (undated) DEVICE — MANIFOLD, 4 PORT NEPTUNE STANDARD

## (undated) DEVICE — CAUTERY, PENCIL, PUSH BUTTON, SMOKE EVAC, 70MM

## (undated) DEVICE — HYSTEROSCOPE, AVETA CORAL, 4.6MM

## (undated) DEVICE — APPLICATOR, ARISTA, FLEXITIP, XL, LF

## (undated) DEVICE — SUTURE, VICRYL, 0, 27 IN, UR-6, VIOLET

## (undated) DEVICE — TOWEL, SURGICAL, NEURO, O/R, 16 X 26, BLUE, STERILE

## (undated) DEVICE — PREP TRAY, SKIN, DRY, W/GLOVES

## (undated) DEVICE — SYRINGE, W/CANNULA, VIAL ACCESS, INTERLINK, W/NEEDLE, 15 G

## (undated) DEVICE — TUBE, SALEM SUMP, 16 FR X 48IN, ENFIT

## (undated) DEVICE — HEMOSTAT, SURGICEL POWDER 3.0GRAMS

## (undated) DEVICE — SYRINGE, LUER LOCK, 12ML

## (undated) DEVICE — COVER, CART, 45 X 27 X 48 IN, CLEAR

## (undated) DEVICE — GLOVE, SURGICAL, PROTEXIS NEOPRENE, 6.5, PF, LF

## (undated) DEVICE — DRESSING, ADHESIVE, ISLAND, TELFA, 2 X 3.75 IN, LF

## (undated) DEVICE — RETRACTOR, CERVICAL CUP, VCARE, STANDARD

## (undated) DEVICE — TROCAR, KII OPTICAL BLADELESS 5MM Z THREAD 100MM LNGTH

## (undated) DEVICE — CATHETER, URETHRAL, ALL PURPOSE, 16FR

## (undated) DEVICE — POSITIONING, THE PINK PAD, PIGAZZI SYSTEM

## (undated) DEVICE — ACCESSORY, FLUID MANAGEMENT, AVETA

## (undated) DEVICE — OCCLUDER, COLPO-PNEUMO

## (undated) DEVICE — TUBE SET, PNEUMOCLEAR, SMOKE EVACU, HIGH-FLOW

## (undated) DEVICE — SOLUTION, IRRIGATION, SODIUM CHLORIDE 0.9%, 1000 ML, POUR BOTTLE

## (undated) DEVICE — TRAY, MINOR, SINGLE BASIN, STERILE

## (undated) DEVICE — APPLICATOR, ENDOSCOPIC, F/SURGICEL POWDER

## (undated) DEVICE — TROCAR SYSTEM, BALLOON, KII GELPORT, 12 X 100MM

## (undated) DEVICE — SUTURE, VICRYL, 4-0, 18 IN, UNDYED BR PS-2

## (undated) DEVICE — SOLUTION, IRRIGATION, STERILE WATER, 1000 ML, POUR BOTTLE

## (undated) DEVICE — DRAPE, PAD, PREP, W/ 9 IN CUFF, 24 X 41, LF, NS

## (undated) DEVICE — PUMP, STRYKERFLOW 2 & HANDPIECE W/10FT. IRRIGATION TUBING

## (undated) DEVICE — SLEEVE, SURGICAL, 21.5 X 5.5 IN, LF, STERILE

## (undated) DEVICE — Device

## (undated) DEVICE — IRRIGATION SET, CYSTOSCOPY, F/CONSTANT/INTERMITTENT, 8 GTT/CC, 77 IN

## (undated) DEVICE — LIGASURE, V SEALER/DIVIDER  5MM BLUNT TIP

## (undated) DEVICE — GOWN, SURGICAL, SMARTGOWN, XLARGE, STERILE

## (undated) DEVICE — TOWEL PACK, STERILE, 4/PACK, BLUE

## (undated) DEVICE — BRIEF, PANTY, MESH, XL, 2PK

## (undated) DEVICE — SYRINGE, 35 CC, LUER LOCK, MONOJECT, W/O CAP, LF

## (undated) DEVICE — SYRINGE, 60 CC, LUER LOCK, MONOJECT, W/O CAP, LF

## (undated) DEVICE — ACCESSORY, AVETA, WASTE MANAGEMENT WITH CAP

## (undated) DEVICE — HOLSTER, JET SAFETY

## (undated) DEVICE — REST, HEAD, BAGEL, 9 IN